# Patient Record
Sex: FEMALE | Race: WHITE | Employment: FULL TIME | URBAN - METROPOLITAN AREA
[De-identification: names, ages, dates, MRNs, and addresses within clinical notes are randomized per-mention and may not be internally consistent; named-entity substitution may affect disease eponyms.]

---

## 2017-07-11 ENCOUNTER — TELEPHONE (OUTPATIENT)
Dept: FAMILY MEDICINE CLINIC | Age: 52
End: 2017-07-11

## 2017-07-11 DIAGNOSIS — Z85.850 HISTORY OF THYROID CANCER: Primary | ICD-10-CM

## 2017-07-26 ENCOUNTER — TELEPHONE (OUTPATIENT)
Dept: FAMILY MEDICINE CLINIC | Age: 52
End: 2017-07-26

## 2017-07-31 ENCOUNTER — TELEPHONE (OUTPATIENT)
Dept: FAMILY MEDICINE CLINIC | Age: 52
End: 2017-07-31

## 2017-07-31 ENCOUNTER — HOSPITAL ENCOUNTER (OUTPATIENT)
Dept: NURSING | Age: 52
Discharge: HOME | End: 2017-07-31
Payer: COMMERCIAL

## 2017-07-31 VITALS
WEIGHT: 210.6 LBS | BODY MASS INDEX: 36.15 KG/M2 | HEART RATE: 78 BPM | TEMPERATURE: 98.1 F | SYSTOLIC BLOOD PRESSURE: 129 MMHG | RESPIRATION RATE: 16 BRPM | DIASTOLIC BLOOD PRESSURE: 75 MMHG

## 2017-07-31 PROCEDURE — 6360000002 HC RX W HCPCS: Performed by: FAMILY MEDICINE

## 2017-07-31 PROCEDURE — 96372 THER/PROPH/DIAG INJ SC/IM: CPT

## 2017-07-31 RX ADMIN — THYROTROPIN ALFA 0.9 MG: KIT at 12:28

## 2017-07-31 ASSESSMENT — PAIN - FUNCTIONAL ASSESSMENT: PAIN_FUNCTIONAL_ASSESSMENT: 0-10

## 2017-07-31 NOTE — IP AVS SNAPSHOT
After Visit Summary  (Discharge Instructions)    Medication List for Home    Based on the information you provided to us as well as any changes during this visit, the following is your updated medication list.  Compare this with your prescription bottles at home. If you have any questions or concerns, contact your primary care physician's office. Daily Medication List (This medication list can be shared with any healthcare provider who is helping you manage your medications)      ASK your doctor about these medications if you have questions        Last Dose    Next Dose Due AM NOON PM NIGHT    calcitRIOL 0.5 MCG capsule   Commonly known as:  ROCALTROL   Take 1 capsule by mouth daily                                         CALCIUM CARBONATE PO   Take 648 mg by mouth daily                                         levothyroxine 175 MCG tablet   Commonly known as:  SYNTHROID   Take 200 mcg by mouth daily 200 mg mon thru sat 100 mcg on sunday                                         thyrotropin elder 1.1 MG injection   Commonly known as:  THYROGEN   administer 1.1 mg IM on day one, administer 1.1 mg IM on day two at the same time as dose one.                0.9 mg on 7/31/2017 12:28 PM                                    Allergies as of 7/31/2017        Reactions    Tape [Adhesive Tape] Dermatitis    bandaids      Immunizations as of 7/31/2017     No immunizations on file. Last Vitals          Most Recent Value    Temp  98.1 °F (36.7 °C)    Pulse  78    Resp  16    BP  129/75         After Visit Summary    This summary was created for you. Thank you for entrusting your care to us.   The following information includes details about your hospital/visit stay along with steps you should take to help with your recovery once you leave the hospital.  In this packet, you will find information about the topics listed below:    · Instructions about your medications including a list of your home medications · A summary of your hospital visit  · Follow-up appointments once you have left the hospital  · Your care plan at home      You may receive a survey regarding the care you received during your stay. Your input is valuable to us. We encourage you to complete and return your survey in the envelope provided. We hope you will choose us in the future for your healthcare needs. Patient Information     Patient Name LILY Houser 1965      Care Provided at:     Name Address Phone       [address] [phone]            Your Visit    Here you will find information about your visit, including the reason for your visit. Please take this sheet with you when you visit your doctor or other health care provider in the future. It will help determine the best possible medical care for you at that time. If you have any questions once you leave the hospital, please call the department phone number listed below. Why you were here     Your primary diagnosis was:  Not on File      Visit Information     Date & Time Department Dept. Phone    2017 [facility] [phone]       Follow-up Appointments    Below is a list of your follow-up and future appointments. This may not be a complete list as you may have made appointments directly with providers that we are not aware of or your providers may have made some for you. Please call your providers to confirm appointments. It is important to keep your appointments. Please bring your current insurance card, photo ID, co-pay, and all medication bottles to your appointment. If self-pay, payment is expected at the time of service.         Future Appointments     2017 11:30 AM     Appointment with [facility] EXAM ROOM 3 at [facility] ([phone])   Please report to second floor, 2C (main elevators, turn right)   [address]  [place] [facility] [address] 8/28/2017 7:00 AM     Appointment with GELA Malik at [facility] ([phone])   PREPS:  * Arrive 15 minutes prior  * No powder, lotion, or deodorant under the arms or around the breast area  * No perfumes or scented products Please report to [address], [address] [facility], [address] [facility] [address]  [address]       8/28/2017 1:00 PM     Appointment with Carmen Hopper CNP at [place] [address] ([phone])   If this is a sports or school physical please bring the physical form with you. [address] [facility]         Preventive Care        Date Due    HIV screening is recommended for all people regardless of risk factors  aged 15-65 years at least once (lifetime) who have never been HIV tested. 4/3/1982    Tetanus Combination Vaccine (1 - Tdap) 4/3/1986    Colonoscopy 4/3/2017    Yearly Flu Vaccine (1) 8/1/2017    Pap Smear 9/1/2017    Mammograms are recommended every 2 years for low/average risk patients aged 48 - 69, and every year for high risk patients per updated national guidelines. However these guidelines can be individualized by your provider. 2/15/2018    Cholesterol Screening 9/29/2021                 Care Plan Once You Return Home    This section includes instructions you will need to follow once you leave the hospital.  Your care team will discuss these with you, so you and those caring for you know how to best care for your health needs at home. This section may also include educational information about certain health topics that may be of help to you. Discharge Instructions       Return Tuesday around 11:30 for next injection    Important information for a smoker       SMOKING: QUIT SMOKING. THIS IS THE MOST IMPORTANT ACTION YOU CAN TAKE TO IMPROVE YOUR CURRENT AND FUTURE HEALTH.      Call the [facility] [place] [facility] [place] NOW ([phone]) Smoking harms nonsmokers. When nonsmokers are around people who smoke, they absorb nicotine, carbon monoxide, and other ingredients of tobacco smoke. DO NOT SMOKE AROUND CHILDREN     Children exposed to secondhand smoke are at an increased risk of:  Sudden Infant Death Syndrome (SIDS), acute respiratory infections, inflammation of the middle ear, and severe asthma. Over a longer time, it causes heart disease and lung cancer. There is no safe level of exposure to secondhand smoke. [employer] Signup     Our records indicate that you have an active [employer] account. You can view your After Visit Summary by going to [email or web address] and logging in with your D[employer]hen username and password. If you don't have a D[employer] username and password but a parent or guardian has access to your record, the parent or guardian should login with their own D[employer]hen username and password and access your record to view the After Visit Summary. Additional Information  If you have questions, please contact the physician practice where you receive care. Remember, [employer] is NOT to be used for urgent needs. For medical emergencies, dial 911. For questions regarding your [employer] account call [phone]. If you have a clinical question, please call your doctor's office. View your information online  ? Review your current list of  medications, immunization, and allergies. ? Review your future test results online . ? Review your discharge instructions provided by your caregivers at discharge    Certain functionality such as prescription refills, scheduling appointments or sending messages to your provider are not activated if your provider does not use [employer] in his/her office    For questions regarding your [employer] account call [phone]. If you have a clinical question, please call your doctor's office. The information on all pages of the After Visit Summary has been reviewed with me, the patient and/or responsible adult, by my health care provider(s). I had the opportunity to ask questions regarding this information. I understand I should dispose of my armband safely at home to protect my health information. A complete copy of the After Visit Summary has been given to me, the patient and/or responsible adult.            Patient Signature/Responsible Adult:____________________    Clinician Signature:_____________________    Date:_____________________    Time:_____________________

## 2017-07-31 NOTE — IP AVS SNAPSHOT
Patient Information     Patient Name LILY Yarbrough 1965         This is your updated medication list to keep with you all times      ASK your doctor about these medications     calcitRIOL 0.5 MCG capsule   Commonly known as:  ROCALTROL       CALCIUM CARBONATE PO       levothyroxine 175 MCG tablet   Commonly known as:  SYNTHROID       thyrotropin elder 1.1 MG injection   Commonly known as:  THYROGEN   administer 1.1 mg IM on day one, administer 1.1 mg IM on day two at the same time as dose one.

## 2017-07-31 NOTE — PROGRESS NOTES
[id number] Alert female admitted for injection procedure reviewed with pt verbalized understanding. Pt rights and responsibilities offered to pt to read. 1240 Home instructions to pt verbalized understanding,  [phone] Discharged ambulatory stable home.         __met__ Safety:       (Environmental)   [place] to environment   Ensure ID band is correct and in place/ allergy band as needed   Assess for fall risk   Initiate fall precautions as applicable (fall band, side rails, etc.)   Call light within reach   Bed in low position/ wheels locked    __met__ Pain:        Assess pain level and characteristics   Administer analgesics as ordered   Assess effectiveness of pain management and report to MD as needed    ___met_ Knowledge Deficit:   Assess baseline knowledge   Provide teaching at level of understanding   Provide teaching via preferred learning method   Evaluate teaching effectiveness    ___

## 2017-08-01 ENCOUNTER — HOSPITAL ENCOUNTER (OUTPATIENT)
Dept: NURSING | Age: 52
Discharge: HOME | End: 2017-08-01
Payer: COMMERCIAL

## 2017-08-01 VITALS
SYSTOLIC BLOOD PRESSURE: 136 MMHG | HEART RATE: 80 BPM | TEMPERATURE: 97.7 F | OXYGEN SATURATION: 99 % | RESPIRATION RATE: 16 BRPM | DIASTOLIC BLOOD PRESSURE: 83 MMHG | WEIGHT: 209 LBS | BODY MASS INDEX: 35.87 KG/M2

## 2017-08-01 PROCEDURE — 96372 THER/PROPH/DIAG INJ SC/IM: CPT

## 2017-08-01 PROCEDURE — 6360000002 HC RX W HCPCS: Performed by: FAMILY MEDICINE

## 2017-08-01 RX ADMIN — THYROTROPIN ALFA 0.9 MG: 0.9 INJECTION, POWDER, FOR SOLUTION INTRAMUSCULAR at 12:19

## 2017-08-01 NOTE — IP AVS SNAPSHOT
Patient Information     Patient Name LILY Means 1965         This is your updated medication list to keep with you all times      ASK your doctor about these medications     calcitRIOL 0.5 MCG capsule   Commonly known as:  ROCALTROL       CALCIUM CARBONATE PO       levothyroxine 175 MCG tablet   Commonly known as:  SYNTHROID       thyrotropin elder 1.1 MG injection   Commonly known as:  THYROGEN   administer 1.1 mg IM on day one, administer 1.1 mg IM on day two at the same time as dose one.

## 2017-08-01 NOTE — IP AVS SNAPSHOT
After Visit Summary  (Discharge Instructions)    Medication List for Home    Based on the information you provided to us as well as any changes during this visit, the following is your updated medication list.  Compare this with your prescription bottles at home. If you have any questions or concerns, contact your primary care physician's office. Daily Medication List (This medication list can be shared with any healthcare provider who is helping you manage your medications)      ASK your doctor about these medications if you have questions        Last Dose    Next Dose Due AM NOON PM NIGHT    calcitRIOL 0.5 MCG capsule   Commonly known as:  ROCALTROL   Take 1 capsule by mouth daily                                         CALCIUM CARBONATE PO   Take 648 mg by mouth daily                                         levothyroxine 175 MCG tablet   Commonly known as:  SYNTHROID   Take 200 mcg by mouth daily 200 mg mon thru sat 100 mcg on sunday                                         thyrotropin elder 1.1 MG injection   Commonly known as:  THYROGEN   administer 1.1 mg IM on day one, administer 1.1 mg IM on day two at the same time as dose one. Allergies as of 8/1/2017        Reactions    Tape [Adhesive Tape] Dermatitis    bandaids      Immunizations as of 8/1/2017     No immunizations on file. Last Vitals          Most Recent Value    Temp  97.7 °F (36.5 °C)    Pulse  80    Resp  16    BP  136/83         After Visit Summary    This summary was created for you. Thank you for entrusting your care to us.   The following information includes details about your hospital/visit stay along with steps you should take to help with your recovery once you leave the hospital.  In this packet, you will find information about the topics listed below:    · Instructions about your medications including a list of your home medications  · A summary of your hospital visit · Follow-up appointments once you have left the hospital  · Your care plan at home      You may receive a survey regarding the care you received during your stay. Your input is valuable to us. We encourage you to complete and return your survey in the envelope provided. We hope you will choose us in the future for your healthcare needs. Patient Information     Patient Name LILY Gomez 1965      Care Provided at:     Name Address Phone       [address] [phone]            Your Visit    Here you will find information about your visit, including the reason for your visit. Please take this sheet with you when you visit your doctor or other health care provider in the future. It will help determine the best possible medical care for you at that time. If you have any questions once you leave the hospital, please call the department phone number listed below. Why you were here     Your primary diagnosis was:  Not on File      Visit Information     Date & Time Department Dept. Phone    2017 Sandra Westley [place] 281 [phone]       Follow-up Appointments    Below is a list of your follow-up and future appointments. This may not be a complete list as you may have made appointments directly with providers that we are not aware of or your providers may have made some for you. Please call your providers to confirm appointments. It is important to keep your appointments. Please bring your current insurance card, photo ID, co-pay, and all medication bottles to your appointment. If self-pay, payment is expected at the time of service.         Future Appointments     2017 7:00 AM     Appointment with GELA Shoemaker at [facility] ([phone])   PREPS:  * Arrive 15 minutes prior  * No powder, lotion, or deodorant under the arms or around the breast area  * No perfumes or scented products Please report to [address] [place] [address]., [address], [address] [facility], [address] [facility] [address]  Jakci Garcia       8/28/2017 1:00 PM     Appointment with Cleo Ayala CNP at [place] [address] ([phone])   If this is a sports or school physical please bring the physical form with you. [facility]         Preventive Care        Date Due    HIV screening is recommended for all people regardless of risk factors  aged 15-65 years at least once (lifetime) who have never been HIV tested. 4/3/1982    Tetanus Combination Vaccine (1 - Tdap) 4/3/1986    Colonoscopy 4/3/2017    Yearly Flu Vaccine (1) 8/1/2017    Pap Smear 9/1/2017    Mammograms are recommended every 2 years for low/average risk patients aged 48 - 69, and every year for high risk patients per updated national guidelines. However these guidelines can be individualized by your provider. 2/15/2018    Cholesterol Screening 9/29/2021                 Care Plan Once You Return Home    This section includes instructions you will need to follow once you leave the hospital.  Your care team will discuss these with you, so you and those caring for you know how to best care for your health needs at home. This section may also include educational information about certain health topics that may be of help to you. Discharge Instructions       ACTIVITY:  Continue usual care with your doctor. Call your doctor immediately if any severe problems or go to the nearest emergency room. I have been treated and hereby acknowledge receiving this instruction sheet. Important information for a smoker       SMOKING: QUIT SMOKING. THIS IS THE MOST IMPORTANT ACTION YOU CAN TAKE TO IMPROVE YOUR CURRENT AND FUTURE HEALTH.      Call the [address] [place] [address] at [place] NOW ([phone]) Smoking harms nonsmokers. When nonsmokers are around people who smoke, they absorb nicotine, carbon monoxide, and other ingredients of tobacco smoke. DO NOT SMOKE AROUND CHILDREN     Children exposed to secondhand smoke are at an increased risk of:  Sudden Infant Death Syndrome (SIDS), acute respiratory infections, inflammation of the middle ear, and severe asthma. Over a longer time, it causes heart disease and lung cancer. There is no safe level of exposure to secondhand smoke. [employer] Signup     Our records indicate that you have an active [employer] account. You can view your After Visit Summary by going to [email or web address] and logging in with your [employer] username and password. If you don't have a [employer] username and password but a parent or guardian has access to your record, the parent or guardian should login with their own [employer] username and password and access your record to view the After Visit Summary. Additional Information  If you have questions, please contact the physician practice where you receive care. Remember, [employer] is NOT to be used for urgent needs. For medical emergencies, dial 911. For questions regarding your [employer] account call [phone]. If you have a clinical question, please call your doctor's office. View your information online  ? Review your current list of  medications, immunization, and allergies. ? Review your future test results online . ? Review your discharge instructions provided by your caregivers at discharge    Certain functionality such as prescription refills, scheduling appointments or sending messages to your provider are not activated if your provider does not use [email or web address] in his/her office    For questions regarding your [employer] account call [phone]. If you have a clinical question, please call your doctor's office. The information on all pages of the After Visit Summary has been reviewed with me, the patient and/or responsible adult, by my health care provider(s). I had the opportunity to ask questions regarding this information. I understand I should dispose of my armband safely at home to protect my health information. A complete copy of the After Visit Summary has been given to me, the patient and/or responsible adult. Patient Signature/Responsible Adult:____________________    Clinician Signature:_____________________    Date:_____________________    Time:_____________________        More Information           thyrotropin elder  Pronunciation:  HELENA Barnes  Brand:  Thyrogen  What is the most important information I should know about thyrotropin elder? Carefully follow your doctor's instructions about the timing of your medications, scans, and other treatments. Tell each of your healthcare providers about all your medical conditions, allergies, and all medicines you use. What is thyrotropin elder? Thyrotropin elder is a manmade form of a protein similar to thyroid-stimulating hormone (TSH), which is normally produced by your thyroid. Thyrotropin elder keeps your TSH levels steady while you undergo thyroid tests or treatments that can reduce TSH and cause symptoms of low thyroid (hypothyroidism). Thyrotropin elder is used together with radioactive iodine ablation (a procedure to remove thyroid tissue that was not removed with surgery) in people with thyroid cancer. Thyrotropin elder is also used during medical testing to check for certain types of thyroid cancer that has returned after treatment. Thyrotropin elder may not help your doctor find all signs of cancer, and there is still a chance that some of your cancer could be missed. Thyrotropin elder will not treat cancer that has spread to other parts of the body. Thyrotropin elder may also be used for purposes not listed in this medication guide. What should I discuss with my healthcare provider before receiving thyrotropin elder? You should not use thyrotropin elder if you are allergic to it. To make sure thyrotropin elder is safe for you, tell your doctor if you have:  · kidney disease (or if you are on dialysis);  · heart disease or history of stroke;  · if you take birth control pills; or  · if you are a woman and you smoke or have migraine headaches. FDA pregnancy category C. It is not known whether thyrotropin elder will harm an unborn baby. Tell your doctor if you are pregnant or plan to become pregnant while using this medicine. It is not known whether thyrotropin elder passes into breast milk or if it could harm a nursing baby. Tell your doctor if you are breast-feeding a baby. How is thyrotropin elder given? Thyrotropin elder is injected into a muscle of the buttock. A healthcare provider will give you this injection. Thyrotropin elder is usually given in 2 separate injections 24 hours apart. You may also be given radioactive iodine to take 24 hours after your last thyrotropin elder injection. If you need a thyroid scan, the scan should take place 48 hours after you take the radioactive iodine. Carefully follow your doctor's instructions about the timing of your medications, scans, and other treatments. Your doctor may want you to receive this medicine in a hospital or clinic setting to quickly treat any serious side effects that occur. You may be given steroid medicine to help keep tumors from growing larger while you are receiving thyrotropin elder. Drink plenty of liquids before you are treated with thyrotropin elder. As part of your treatment, you will need frequent blood tests. You may not notice any change in your symptoms, but your blood work will help your doctor determine whether treatment has been effective. What happens if I miss a dose?   Call your doctor for instructions if you miss an appointment for your thyrotropin elder injection. What happens if I overdose? Since this medicine is given by a healthcare professional in a medical setting, an overdose is unlikely to occur. What should I avoid after receiving thyrotropin elder? Follow your doctor's instructions about any restrictions on food, beverages, or activity. What are the possible side effects of thyrotropin elder? Get emergency medical help if you have any of these signs of an allergic reaction: hives; difficult breathing; swelling of your face, lips, tongue, or throat. Call your doctor at once if you have:  · throat pain or swelling, trouble breathing;  · sudden swelling, pain, numbness, or loss of movement in any part of your body;  · signs of overactive thyroid --unexplained weight loss, increased appetite, changes in bowel habits, fast or pounding heartbeats, sweating, feeling anxious or irritable; or  · signs of a stroke --sudden numbness or weakness (especially on one side of the body), sudden severe headache, slurred speech, problems with vision or balance. Common side effects may include:  · nausea, vomiting, diarrhea;  · headache, dizziness;  · weakness, tired feeling;  · sleep problems (insomnia); or  · numbness or tingly feeling. This is not a complete list of side effects and others may occur. Call your doctor for medical advice about side effects. You may report side effects to FDA at 1-236-FDA-0641. What other drugs will affect thyrotropin elder? Other drugs may interact with thyrotropin elder, including prescription and over-the-counter medicines, vitamins, and herbal products. Tell each of your health care providers about all medicines you use now and any medicine you start or stop using. Where can I get more information? Your pharmacist can provide more information about thyrotropin elder.     Remember, keep this and all other medicines out of the reach of children, never share your medicines with others, and use this medication only for the indication prescribed. Every effort has been made to ensure that the information provided by [address] is accurate, up-to-date, and complete, but no guarantee is made to that effect. Drug information contained herein may be time sensitive. [facility] information has been compiled for use by healthcare practitioners and consumers in the [place] and therefore [facility] does not warrant that uses outside of the [place] are appropriate, unless specifically indicated otherwise. [facility]'s drug information does not endorse drugs, diagnose patients or recommend therapy. [facility]'s drug information is an informational resource designed to assist licensed healthcare practitioners in caring for their patients and/or to serve consumers viewing this service as a supplement to, and not a substitute for, the expertise, skill, knowledge and judgment of healthcare practitioners. The absence of a warning for a given drug or drug combination in no way should be construed to indicate that the drug or drug combination is safe, effective or appropriate for any given patient. [facility] does not assume any responsibility for any aspect of healthcare administered with the aid of information [facility] provides. The information contained herein is not intended to cover all possible uses, directions, precautions, warnings, drug interactions, allergic reactions, or adverse effects. If you have questions about the drugs you are taking, check with your doctor, nurse or pharmacist.  Copyright 9991-1519 [address] [place] [address] Dr CUTLER. Version: 1.02. Revision date: 6/10/2014. Care instructions adapted under license by your healthcare professional. If you have questions about a medical condition or this instruction, always ask your healthcare professional. [place] [address] any warranty or liability for your use of this information.

## 2017-08-01 NOTE — PROGRESS NOTES
12:12 PM pt arrives for injection  12:12 PM PATIENT RIGHTS AND RESPONSIBILITIES SHEET OFFERED TO PT TO READ.   1215   __m__ Safety:       (Environmental)   [place] to environment   Ensure ID band is correct and in place/ allergy band as needed   Assess for fall risk   Initiate fall precautions as applicable (fall band, side rails, etc.)   Call light within reach   Bed in low position/ wheels locked    __m__ Pain:        Assess pain level and characteristics   Administer analgesics as ordered   Assess effectiveness of pain management and report to MD as needed    __m__ Knowledge Deficit:   Assess baseline knowledge   Provide teaching at level of understanding   Provide teaching via preferred learning method   Evaluate teaching effectiveness       1220 WRITTEN DISCHARGE INSTRUCTIONS GIVEN TO PT-VERBALIZES UNDERSTANDING    PT DISCHARGED AMBULATORY IN SATISFACTORY CONDITION

## 2017-08-28 ENCOUNTER — OFFICE VISIT (OUTPATIENT)
Dept: FAMILY MEDICINE CLINIC | Age: 52
End: 2017-08-28
Payer: COMMERCIAL

## 2017-08-28 ENCOUNTER — HOSPITAL ENCOUNTER (OUTPATIENT)
Dept: WOMENS IMAGING | Age: 52
Discharge: HOME | End: 2017-08-28
Payer: COMMERCIAL

## 2017-08-28 VITALS
RESPIRATION RATE: 16 BRPM | HEART RATE: 84 BPM | WEIGHT: 208.4 LBS | DIASTOLIC BLOOD PRESSURE: 76 MMHG | SYSTOLIC BLOOD PRESSURE: 124 MMHG | TEMPERATURE: 97.6 F | BODY MASS INDEX: 36.93 KG/M2 | HEIGHT: 63 IN

## 2017-08-28 DIAGNOSIS — Z13.1 SCREENING FOR DIABETES MELLITUS: ICD-10-CM

## 2017-08-28 DIAGNOSIS — E66.09 NON MORBID OBESITY DUE TO EXCESS CALORIES: ICD-10-CM

## 2017-08-28 DIAGNOSIS — Z23 NEED FOR DIPHTHERIA-TETANUS-PERTUSSIS (TDAP) VACCINE: ICD-10-CM

## 2017-08-28 DIAGNOSIS — Z12.31 VISIT FOR SCREENING MAMMOGRAM: ICD-10-CM

## 2017-08-28 DIAGNOSIS — E78.00 HYPERCHOLESTEREMIA: ICD-10-CM

## 2017-08-28 DIAGNOSIS — Z12.11 COLON CANCER SCREENING: ICD-10-CM

## 2017-08-28 DIAGNOSIS — Z00.00 WELL ADULT EXAM: Primary | ICD-10-CM

## 2017-08-28 DIAGNOSIS — Z11.4 SCREENING FOR HIV (HUMAN IMMUNODEFICIENCY VIRUS): ICD-10-CM

## 2017-08-28 PROCEDURE — 77063 BREAST TOMOSYNTHESIS BI: CPT

## 2017-08-28 PROCEDURE — 99396 PREV VISIT EST AGE 40-64: CPT | Performed by: NURSE PRACTITIONER

## 2017-08-28 PROCEDURE — 90715 TDAP VACCINE 7 YRS/> IM: CPT | Performed by: NURSE PRACTITIONER

## 2017-08-28 PROCEDURE — 90471 IMMUNIZATION ADMIN: CPT | Performed by: NURSE PRACTITIONER

## 2017-08-28 ASSESSMENT — ENCOUNTER SYMPTOMS
NAUSEA: 0
CONSTIPATION: 0
ABDOMINAL PAIN: 0
APNEA: 0
VOICE CHANGE: 0
BLOOD IN STOOL: 0
DIARRHEA: 0
VOMITING: 0
BACK PAIN: 0
WHEEZING: 0
TROUBLE SWALLOWING: 0
ABDOMINAL DISTENTION: 0
COUGH: 0
SHORTNESS OF BREATH: 0
PHOTOPHOBIA: 0
ANAL BLEEDING: 0

## 2017-08-28 ASSESSMENT — PATIENT HEALTH QUESTIONNAIRE - PHQ9
SUM OF ALL RESPONSES TO PHQ9 QUESTIONS 1 & 2: 0
2. FEELING DOWN, DEPRESSED OR HOPELESS: 0
1. LITTLE INTEREST OR PLEASURE IN DOING THINGS: 0
SUM OF ALL RESPONSES TO PHQ QUESTIONS 1-9: 0

## 2017-09-29 ENCOUNTER — HOSPITAL ENCOUNTER (OUTPATIENT)
Age: 52
Discharge: HOME | End: 2017-09-29
Payer: COMMERCIAL

## 2017-09-29 ENCOUNTER — TELEPHONE (OUTPATIENT)
Dept: FAMILY MEDICINE CLINIC | Age: 52
End: 2017-09-29

## 2017-09-29 DIAGNOSIS — Z13.1 SCREENING FOR DIABETES MELLITUS: ICD-10-CM

## 2017-09-29 DIAGNOSIS — E78.00 HYPERCHOLESTEREMIA: ICD-10-CM

## 2017-09-29 DIAGNOSIS — E78.00 ELEVATED LDL CHOLESTEROL LEVEL: ICD-10-CM

## 2017-09-29 DIAGNOSIS — R73.09 ELEVATED GLUCOSE: Primary | ICD-10-CM

## 2017-09-29 LAB
ALBUMIN SERPL-MCNC: 4.2 G/DL (ref 3.5–5.1)
ALP BLD-CCNC: 54 U/L (ref 38–126)
ALT SERPL-CCNC: 8 U/L (ref 11–66)
ANION GAP SERPL CALCULATED.3IONS-SCNC: 13 MEQ/L (ref 8–16)
AST SERPL-CCNC: 10 U/L (ref 5–40)
BILIRUB SERPL-MCNC: 0.2 MG/DL (ref 0.3–1.2)
BUN BLDV-MCNC: 19 MG/DL (ref 7–22)
CALCIUM SERPL-MCNC: 8 MG/DL (ref 8.5–10.5)
CHLORIDE BLD-SCNC: 106 MEQ/L (ref 98–111)
CHOLESTEROL, TOTAL: 187 MG/DL (ref 100–199)
CO2: 23 MEQ/L (ref 23–33)
CREAT SERPL-MCNC: 0.6 MG/DL (ref 0.4–1.2)
GFR SERPL CREATININE-BSD FRML MDRD: > 90 ML/MIN/1.73M2
GLUCOSE BLD-MCNC: 118 MG/DL (ref 70–108)
HDLC SERPL-MCNC: 46 MG/DL
LDL CHOLESTEROL CALCULATED: 120 MG/DL
POTASSIUM SERPL-SCNC: 4.4 MEQ/L (ref 3.5–5.2)
SODIUM BLD-SCNC: 142 MEQ/L (ref 135–145)
TOTAL PROTEIN: 6.6 G/DL (ref 6.1–8)
TRIGL SERPL-MCNC: 106 MG/DL (ref 0–199)

## 2017-09-29 PROCEDURE — 80053 COMPREHEN METABOLIC PANEL: CPT

## 2017-09-29 PROCEDURE — 80061 LIPID PANEL: CPT

## 2017-09-29 PROCEDURE — 36415 COLL VENOUS BLD VENIPUNCTURE: CPT

## 2017-11-17 ENCOUNTER — TELEPHONE (OUTPATIENT)
Dept: FAMILY MEDICINE CLINIC | Age: 52
End: 2017-11-17

## 2017-11-17 RX ORDER — AMOXICILLIN AND CLAVULANATE POTASSIUM 875; 125 MG/1; MG/1
1 TABLET, FILM COATED ORAL 2 TIMES DAILY
Qty: 20 TABLET | Refills: 0 | Status: SHIPPED | OUTPATIENT
Start: 2017-11-17 | End: 2017-11-27

## 2017-11-17 NOTE — TELEPHONE ENCOUNTER
Pt is complaining of sinus congestion and pressure, cough and has had a sore throat (comes and goes) x 2-3 weeks. She has tried OTC medications such as tylenol cold, sudafed, and claritin. 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 Pt is requesting something to be called in due to not being able to come in and does not have a desktop to complete an e-visit.     Rach

## 2017-12-22 ENCOUNTER — HOSPITAL ENCOUNTER (OUTPATIENT)
Age: 52
Discharge: HOME | End: 2017-12-22
Payer: COMMERCIAL

## 2017-12-22 ENCOUNTER — HOSPITAL ENCOUNTER (OUTPATIENT)
Dept: GENERAL RADIOLOGY | Age: 52
Discharge: HOME | End: 2017-12-22
Payer: COMMERCIAL

## 2017-12-22 DIAGNOSIS — Z01.818 PREOP EXAMINATION: ICD-10-CM

## 2017-12-22 DIAGNOSIS — Z87.891 PERSONAL HISTORY OF TOBACCO USE, PRESENTING HAZARDS TO HEALTH: ICD-10-CM

## 2017-12-22 LAB
ANION GAP SERPL CALCULATED.3IONS-SCNC: 13 MEQ/L (ref 8–16)
BUN BLDV-MCNC: 13 MG/DL (ref 7–22)
CALCIUM SERPL-MCNC: 8.3 MG/DL (ref 8.5–10.5)
CHLORIDE BLD-SCNC: 101 MEQ/L (ref 98–111)
CO2: 26 MEQ/L (ref 23–33)
CREAT SERPL-MCNC: 0.6 MG/DL (ref 0.4–1.2)
EKG ATRIAL RATE: 76 BPM
EKG P AXIS: 41 DEGREES
EKG P-R INTERVAL: 148 MS
EKG Q-T INTERVAL: 396 MS
EKG QRS DURATION: 86 MS
EKG QTC CALCULATION (BAZETT): 445 MS
EKG R AXIS: 52 DEGREES
EKG T AXIS: 21 DEGREES
EKG VENTRICULAR RATE: 76 BPM
GFR SERPL CREATININE-BSD FRML MDRD: > 90 ML/MIN/1.73M2
GLUCOSE BLD-MCNC: 110 MG/DL (ref 70–108)
HCT VFR BLD CALC: 33 % (ref 37–47)
HEMOGLOBIN: 10.7 GM/DL (ref 12–16)
MCH RBC QN AUTO: 23.4 PG (ref 27–31)
MCHC RBC AUTO-ENTMCNC: 32.5 GM/DL (ref 33–37)
MCV RBC AUTO: 71.9 FL (ref 81–99)
PDW BLD-RTO: 17.6 % (ref 11.5–14.5)
PLATELET # BLD: 343 THOU/MM3 (ref 130–400)
PMV BLD AUTO: 8.6 MCM (ref 7.4–10.4)
POTASSIUM SERPL-SCNC: 4.1 MEQ/L (ref 3.5–5.2)
RBC # BLD: 4.58 MILL/MM3 (ref 4.2–5.4)
SODIUM BLD-SCNC: 140 MEQ/L (ref 135–145)
WBC # BLD: 9.9 THOU/MM3 (ref 4.8–10.8)

## 2017-12-22 PROCEDURE — 85027 COMPLETE CBC AUTOMATED: CPT

## 2017-12-22 PROCEDURE — 93005 ELECTROCARDIOGRAM TRACING: CPT

## 2017-12-22 PROCEDURE — 36415 COLL VENOUS BLD VENIPUNCTURE: CPT

## 2017-12-22 PROCEDURE — 80048 BASIC METABOLIC PNL TOTAL CA: CPT

## 2017-12-22 PROCEDURE — 71020 XR CHEST STANDARD TWO VW: CPT

## 2017-12-28 NOTE — PROGRESS NOTES
PAT call attempted patient unavailable left message with instructions    NPO after midnight  Bring insurance info and drivers license  Wear comfortable clean clothing  Do not bring jewelry   Shower night before and morning of surgery with a liquid antibacterial soap  Bring list of medications with dosage and how often taken  Follow all instructions given by your physician   needed at discharge

## 2017-12-29 ENCOUNTER — HOSPITAL ENCOUNTER (OUTPATIENT)
Age: 52
Setting detail: OUTPATIENT SURGERY
Discharge: HOME | End: 2017-12-29
Attending: SPECIALIST | Admitting: SPECIALIST
Payer: COMMERCIAL

## 2017-12-29 ENCOUNTER — ANESTHESIA (OUTPATIENT)
Dept: OPERATING ROOM | Age: 52
End: 2017-12-29
Payer: COMMERCIAL

## 2017-12-29 ENCOUNTER — ANESTHESIA EVENT (OUTPATIENT)
Dept: OPERATING ROOM | Age: 52
End: 2017-12-29
Payer: COMMERCIAL

## 2017-12-29 VITALS — DIASTOLIC BLOOD PRESSURE: 64 MMHG | OXYGEN SATURATION: 100 % | SYSTOLIC BLOOD PRESSURE: 144 MMHG | TEMPERATURE: 97.7 F

## 2017-12-29 VITALS
WEIGHT: 213.4 LBS | HEART RATE: 94 BPM | RESPIRATION RATE: 12 BRPM | OXYGEN SATURATION: 94 % | TEMPERATURE: 98.3 F | BODY MASS INDEX: 37.81 KG/M2 | SYSTOLIC BLOOD PRESSURE: 150 MMHG | HEIGHT: 63 IN | DIASTOLIC BLOOD PRESSURE: 72 MMHG

## 2017-12-29 LAB — PREGNANCY, URINE: NEGATIVE

## 2017-12-29 PROCEDURE — 3600000002 HC SURGERY LEVEL 2 BASE: Performed by: SPECIALIST

## 2017-12-29 PROCEDURE — 3700000001 HC ADD 15 MINUTES (ANESTHESIA): Performed by: SPECIALIST

## 2017-12-29 PROCEDURE — 2500000003 HC RX 250 WO HCPCS

## 2017-12-29 PROCEDURE — 3600000012 HC SURGERY LEVEL 2 ADDTL 15MIN: Performed by: SPECIALIST

## 2017-12-29 PROCEDURE — 2500000003 HC RX 250 WO HCPCS: Performed by: SPECIALIST

## 2017-12-29 PROCEDURE — 7100000010 HC PHASE II RECOVERY - FIRST 15 MIN: Performed by: SPECIALIST

## 2017-12-29 PROCEDURE — 6360000002 HC RX W HCPCS

## 2017-12-29 PROCEDURE — 2500000003 HC RX 250 WO HCPCS: Performed by: NURSE ANESTHETIST, CERTIFIED REGISTERED

## 2017-12-29 PROCEDURE — 6360000002 HC RX W HCPCS: Performed by: SPECIALIST

## 2017-12-29 PROCEDURE — 81025 URINE PREGNANCY TEST: CPT

## 2017-12-29 PROCEDURE — 6370000000 HC RX 637 (ALT 250 FOR IP): Performed by: SPECIALIST

## 2017-12-29 PROCEDURE — 7100000011 HC PHASE II RECOVERY - ADDTL 15 MIN: Performed by: SPECIALIST

## 2017-12-29 PROCEDURE — A6402 STERILE GAUZE <= 16 SQ IN: HCPCS | Performed by: SPECIALIST

## 2017-12-29 PROCEDURE — 6360000002 HC RX W HCPCS: Performed by: NURSE ANESTHETIST, CERTIFIED REGISTERED

## 2017-12-29 PROCEDURE — 3700000000 HC ANESTHESIA ATTENDED CARE: Performed by: SPECIALIST

## 2017-12-29 PROCEDURE — 88305 TISSUE EXAM BY PATHOLOGIST: CPT

## 2017-12-29 RX ORDER — LIDOCAINE HYDROCHLORIDE AND EPINEPHRINE 10; 10 MG/ML; UG/ML
INJECTION, SOLUTION INFILTRATION; PERINEURAL PRN
Status: DISCONTINUED | OUTPATIENT
Start: 2017-12-29 | End: 2017-12-29 | Stop reason: HOSPADM

## 2017-12-29 RX ORDER — KETAMINE HYDROCHLORIDE 50 MG/ML
INJECTION, SOLUTION, CONCENTRATE INTRAMUSCULAR; INTRAVENOUS PRN
Status: DISCONTINUED | OUTPATIENT
Start: 2017-12-29 | End: 2017-12-29 | Stop reason: SDUPTHER

## 2017-12-29 RX ORDER — FENTANYL CITRATE 50 UG/ML
INJECTION, SOLUTION INTRAMUSCULAR; INTRAVENOUS PRN
Status: DISCONTINUED | OUTPATIENT
Start: 2017-12-29 | End: 2017-12-29 | Stop reason: SDUPTHER

## 2017-12-29 RX ORDER — HYDROCODONE BITARTRATE AND ACETAMINOPHEN 5; 325 MG/1; MG/1
TABLET ORAL
Status: DISCONTINUED
Start: 2017-12-29 | End: 2017-12-29 | Stop reason: HOSPADM

## 2017-12-29 RX ORDER — ONDANSETRON 2 MG/ML
INJECTION INTRAMUSCULAR; INTRAVENOUS PRN
Status: DISCONTINUED | OUTPATIENT
Start: 2017-12-29 | End: 2017-12-29 | Stop reason: SDUPTHER

## 2017-12-29 RX ORDER — LIDOCAINE HYDROCHLORIDE 20 MG/ML
INJECTION, SOLUTION INFILTRATION; PERINEURAL PRN
Status: DISCONTINUED | OUTPATIENT
Start: 2017-12-29 | End: 2017-12-29 | Stop reason: SDUPTHER

## 2017-12-29 RX ORDER — PROPOFOL 10 MG/ML
INJECTION, EMULSION INTRAVENOUS PRN
Status: DISCONTINUED | OUTPATIENT
Start: 2017-12-29 | End: 2017-12-29 | Stop reason: SDUPTHER

## 2017-12-29 RX ORDER — SODIUM CHLORIDE 9 MG/ML
INJECTION, SOLUTION INTRAVENOUS CONTINUOUS
Status: DISCONTINUED | OUTPATIENT
Start: 2017-12-29 | End: 2017-12-29 | Stop reason: HOSPADM

## 2017-12-29 RX ORDER — HYDROCODONE BITARTRATE AND ACETAMINOPHEN 5; 325 MG/1; MG/1
1 TABLET ORAL ONCE
Status: COMPLETED | OUTPATIENT
Start: 2017-12-29 | End: 2017-12-29

## 2017-12-29 RX ADMIN — LIDOCAINE HYDROCHLORIDE 100 MG: 20 INJECTION, SOLUTION INFILTRATION; PERINEURAL at 07:55

## 2017-12-29 RX ADMIN — FENTANYL CITRATE 50 MCG: 50 INJECTION INTRAMUSCULAR; INTRAVENOUS at 07:55

## 2017-12-29 RX ADMIN — PROPOFOL 190 MG: 10 INJECTION, EMULSION INTRAVENOUS at 08:00

## 2017-12-29 RX ADMIN — KETAMINE HYDROCHLORIDE 25 MG: 50 INJECTION, SOLUTION INTRAMUSCULAR; INTRAVENOUS at 07:55

## 2017-12-29 RX ADMIN — CEFAZOLIN SODIUM 1 G: 1 INJECTION, SOLUTION INTRAVENOUS at 07:56

## 2017-12-29 RX ADMIN — FENTANYL CITRATE 100 MCG: 50 INJECTION INTRAMUSCULAR; INTRAVENOUS at 08:35

## 2017-12-29 RX ADMIN — FENTANYL CITRATE 50 MCG: 50 INJECTION INTRAMUSCULAR; INTRAVENOUS at 07:51

## 2017-12-29 RX ADMIN — PROPOFOL 30 MG: 10 INJECTION, EMULSION INTRAVENOUS at 07:55

## 2017-12-29 RX ADMIN — HYDROCODONE BITARTRATE AND ACETAMINOPHEN 1 TABLET: 5; 325 TABLET ORAL at 08:55

## 2017-12-29 RX ADMIN — ONDANSETRON 4 MG: 2 INJECTION INTRAMUSCULAR; INTRAVENOUS at 08:30

## 2017-12-29 RX ADMIN — KETAMINE HYDROCHLORIDE 25 MG: 50 INJECTION, SOLUTION INTRAMUSCULAR; INTRAVENOUS at 08:15

## 2017-12-29 ASSESSMENT — PAIN - FUNCTIONAL ASSESSMENT: PAIN_FUNCTIONAL_ASSESSMENT: 0-10

## 2017-12-29 ASSESSMENT — PULMONARY FUNCTION TESTS
PIF_VALUE: 0

## 2017-12-29 ASSESSMENT — PAIN DESCRIPTION - DESCRIPTORS: DESCRIPTORS: ACHING

## 2017-12-29 ASSESSMENT — PAIN SCALES - GENERAL
PAINLEVEL_OUTOF10: 9
PAINLEVEL_OUTOF10: 9

## 2017-12-29 NOTE — ANESTHESIA PRE PROCEDURE
Department of Anesthesiology  Preprocedure Note       Name:  Kennedy Castleman   Age:  50 y.o.  :  1965                                          MRN:  [id number]         Date:  2017      Surgeon: Mellisa Thacker):  Dimple Soto MD    Procedure: Procedure(s):  MOHS REPAIR BCC OF RIGHT ALA    Medications prior to admission:   Prior to Admission medications    Medication Sig Start Date End Date Taking? Authorizing Provider   levothyroxine (SYNTHROID) 175 MCG tablet Take 200 mcg by mouth daily 200 mg mon thru sat 100 mcg on akanksha 11/6/15  Yes Historical Provider, MD       Current medications:    Current Facility-Administered Medications   Medication Dose Route Frequency Provider Last Rate Last Dose    0.9 % sodium chloride infusion   Intravenous Continuous Dimple Soto MD        ceFAZolin (ANCEF) 1 g in dextrose 5 % 50 mL IVPB (premix)  1 g Intravenous Once Dimple Soto MD           Allergies: Allergies   Allergen Reactions    Tape Ella Haymaker Tape] Dermatitis     bandaids       Problem List:    Patient Active Problem List   Diagnosis Code    Post-surgical hypothyroidism E89.0    Papillary carcinoma of thyroid ([facility] [address] C73    Witnessed apneic spells R06.81    Snoring R06.83    Obesity (BMI 30-39. 9) E66.9    Fatigue R53.83    Non-restorative sleep G47.8    Bruxism F45.8    Morning headache R51    Immune disorder (HCC) D89.9    Claustrophobia F40.240    Gastroesophageal reflux disease K21.9    Hypercholesteremia E78.00    Non morbid obesity due to excess calories E66.09       Past Medical History:        Diagnosis Date    BCC (basal cell carcinoma of skin)     Papillary carcinoma of thyroid ([facility] [address]     2015- Dr. Elisha ESPINAL (postoperative nausea and vomiting)        Past Surgical History:        Procedure Laterality Date    CHOLECYSTECTOMY      DILATION AND CURETTAGE OF UTERUS      LYMPHADENECTOMY  2015    63    MOHS SURGERY      PRE-MALIGNANT / BENIGN SKIN LESION last 72 hours. Coags: No results found for: PROTIME, INR, APTT    HCG (If Applicable):   Lab Results   Component Value Date    PREGTESTUR negative 12/29/2017        ABGs: No results found for: PHART, PO2ART, AHQ1AXP, PRC0LID, BEART, U1CIPTBI     Type & Screen (If Applicable):  No results found for: LABABO, LABRH    Anesthesia Evaluation     history of anesthetic complications: PONV. Airway: Mallampati: II       Mouth opening: > = 3 FB Dental:          Pulmonary:       (-) COPD                           Cardiovascular:        (-) CAD      Rhythm: regular                      Neuro/Psych:   (+) headaches:, psychiatric history:            GI/Hepatic/Renal:   (+) GERD:,           Endo/Other:    (+) hypothyroidism::., .                 Abdominal:   (+) obese,         Vascular:                                        Anesthesia Plan      MAC     ASA 2           MIPS: Postoperative opioids intended. Anesthetic plan and risks discussed with patient. Plan discussed with CRNA.                   Lelo Mixon MD   12/29/2017

## 2017-12-29 NOTE — ANESTHESIA POSTPROCEDURE EVALUATION
Department of Anesthesiology  Postprocedure Note    Patient: Jaime Carrillo  MRN: [id number]  YOB: 1965  Date of evaluation: 12/29/2017  Time:  11:14 AM     Procedure Summary     Date:  12/29/17 Room / Location:  [facility] 01 / [address]    Anesthesia Start:  8693 Anesthesia Stop:  Searcy Songster    Procedure:  MOHS REPAIR BCC OF RIGHT ALA and biopsy of left cheek x2 and right calf x1 (Right Ear) Diagnosis:  (BCC OF RIGHT ALA)    Surgeon:  Fab Raya MD Responsible Provider:  Sonia Evans MD    Anesthesia Type:  MAC ASA Status:  2          Anesthesia Type: No value filed. Tre Phase I:      Rte Phase II: Tre Score: 10    Last vitals: Reviewed and per EMR flowsheets. Anesthesia Post Evaluation   [facility]  POST-ANESTHESIA NOTE       Name:  Jaime Carrillo                                         Age:  50 y.o.   MRN:  [id number]      Last Vitals:  BP (!) 150/72   Pulse 94   Temp 98.3 °F (36.8 °C) (Temporal)   Resp 12   Ht 5' 3\" (1.6 m)   Wt 213 lb 6.4 oz (96.8 kg)   LMP 11/23/2017   SpO2 94%   BMI 37.80 kg/m²   Patient Vitals for the past 4 hrs:   BP Temp Temp src Pulse Resp SpO2   12/29/17 0844 (!) 150/72 98.3 °F (36.8 °C) Temporal 94 12 94 %       Level of Consciousness:  Awake    Respiratory:  Stable    Oxygen Saturation:  Stable    Cardiovascular:  Stable    Hydration:  Adequate    PONV:  Stable    Post-op Pain:  Adequate analgesia    Post-op Assessment:  No apparent anesthetic complications    Additional Follow-Up / Treatment / Comment:  None    Sonia Evans MD  December 29, 2017   11:14 AM

## 2018-05-21 ENCOUNTER — OFFICE VISIT (OUTPATIENT)
Dept: FAMILY MEDICINE CLINIC | Age: 53
End: 2018-05-21
Payer: COMMERCIAL

## 2018-05-21 VITALS
BODY MASS INDEX: 37.74 KG/M2 | DIASTOLIC BLOOD PRESSURE: 84 MMHG | SYSTOLIC BLOOD PRESSURE: 116 MMHG | TEMPERATURE: 98 F | WEIGHT: 213 LBS | RESPIRATION RATE: 12 BRPM | HEART RATE: 72 BPM | HEIGHT: 63 IN

## 2018-05-21 DIAGNOSIS — J01.90 ACUTE BACTERIAL SINUSITIS: Primary | ICD-10-CM

## 2018-05-21 DIAGNOSIS — B96.89 ACUTE BACTERIAL SINUSITIS: Primary | ICD-10-CM

## 2018-05-21 DIAGNOSIS — J30.9 ALLERGIC RHINITIS, UNSPECIFIED CHRONICITY, UNSPECIFIED SEASONALITY, UNSPECIFIED TRIGGER: ICD-10-CM

## 2018-05-21 DIAGNOSIS — H65.93 BILATERAL SEROUS OTITIS MEDIA, UNSPECIFIED CHRONICITY: ICD-10-CM

## 2018-05-21 PROCEDURE — 96372 THER/PROPH/DIAG INJ SC/IM: CPT | Performed by: NURSE PRACTITIONER

## 2018-05-21 PROCEDURE — 99213 OFFICE O/P EST LOW 20 MIN: CPT | Performed by: NURSE PRACTITIONER

## 2018-05-21 RX ORDER — PREDNISONE 1 MG/1
TABLET ORAL
Qty: 30 TABLET | Refills: 0 | Status: SHIPPED | OUTPATIENT
Start: 2018-05-21 | End: 2018-05-31

## 2018-05-21 RX ORDER — METHYLPREDNISOLONE ACETATE 80 MG/ML
80 INJECTION, SUSPENSION INTRA-ARTICULAR; INTRALESIONAL; INTRAMUSCULAR; SOFT TISSUE ONCE
Status: COMPLETED | OUTPATIENT
Start: 2018-05-21 | End: 2018-05-21

## 2018-05-21 RX ORDER — CEPHALEXIN 500 MG/1
500 CAPSULE ORAL 3 TIMES DAILY
Qty: 30 CAPSULE | Refills: 0 | Status: SHIPPED | OUTPATIENT
Start: 2018-05-21 | End: 2018-06-22 | Stop reason: ALTCHOICE

## 2018-05-21 RX ORDER — CETIRIZINE HYDROCHLORIDE 10 MG/1
10 TABLET ORAL DAILY
Qty: 30 TABLET | Refills: 1 | Status: SHIPPED | OUTPATIENT
Start: 2018-05-21 | End: 2018-08-15 | Stop reason: SDUPTHER

## 2018-05-21 RX ADMIN — METHYLPREDNISOLONE ACETATE 80 MG: 80 INJECTION, SUSPENSION INTRA-ARTICULAR; INTRALESIONAL; INTRAMUSCULAR; SOFT TISSUE at 12:20

## 2018-05-29 ASSESSMENT — ENCOUNTER SYMPTOMS
RESPIRATORY NEGATIVE: 1
ALLERGIC/IMMUNOLOGIC NEGATIVE: 1
RHINORRHEA: 1
EYES NEGATIVE: 1
GASTROINTESTINAL NEGATIVE: 1

## 2018-05-31 ENCOUNTER — TELEPHONE (OUTPATIENT)
Dept: FAMILY MEDICINE CLINIC | Age: 53
End: 2018-05-31

## 2018-05-31 RX ORDER — AMOXICILLIN 500 MG/1
500 CAPSULE ORAL 3 TIMES DAILY
Qty: 30 CAPSULE | Refills: 0 | Status: SHIPPED | OUTPATIENT
Start: 2018-05-31 | End: 2018-06-10

## 2018-06-12 ENCOUNTER — TELEPHONE (OUTPATIENT)
Dept: ENT CLINIC | Age: 53
End: 2018-06-12

## 2018-06-12 ENCOUNTER — TELEPHONE (OUTPATIENT)
Dept: FAMILY MEDICINE CLINIC | Age: 53
End: 2018-06-12

## 2018-06-12 DIAGNOSIS — R09.81 NASAL CONGESTION: Primary | ICD-10-CM

## 2018-06-22 ENCOUNTER — OFFICE VISIT (OUTPATIENT)
Dept: ENT CLINIC | Age: 53
End: 2018-06-22
Payer: COMMERCIAL

## 2018-06-22 VITALS
HEIGHT: 63 IN | DIASTOLIC BLOOD PRESSURE: 80 MMHG | HEART RATE: 84 BPM | SYSTOLIC BLOOD PRESSURE: 120 MMHG | WEIGHT: 191.3 LBS | BODY MASS INDEX: 33.89 KG/M2 | RESPIRATION RATE: 16 BRPM | TEMPERATURE: 98 F

## 2018-06-22 DIAGNOSIS — J34.89 NASAL OBSTRUCTION: Primary | ICD-10-CM

## 2018-06-22 DIAGNOSIS — H93.8X1 EAR FULLNESS, RIGHT: ICD-10-CM

## 2018-06-22 PROCEDURE — 99203 OFFICE O/P NEW LOW 30 MIN: CPT | Performed by: NURSE PRACTITIONER

## 2018-06-22 ASSESSMENT — ENCOUNTER SYMPTOMS
SHORTNESS OF BREATH: 0
VOICE CHANGE: 0
APNEA: 0
SINUS PRESSURE: 0
VOMITING: 0
COLOR CHANGE: 0
NAUSEA: 0
EYE ITCHING: 0
ABDOMINAL DISTENTION: 0
ABDOMINAL PAIN: 0
CHEST TIGHTNESS: 0
CONSTIPATION: 0
WHEEZING: 0
BACK PAIN: 0
ANAL BLEEDING: 0
DIARRHEA: 0
RECTAL PAIN: 0
BLOOD IN STOOL: 0
EYE REDNESS: 0
STRIDOR: 0
CHOKING: 0
COUGH: 0
EYE DISCHARGE: 0
FACIAL SWELLING: 0
RHINORRHEA: 0
EYE PAIN: 0
TROUBLE SWALLOWING: 0
PHOTOPHOBIA: 0
SORE THROAT: 0

## 2018-06-22 NOTE — PROGRESS NOTES
MOHS SURGERY      MOHS SURGERY Right 12/29/2017    MOHS Repair BCC of Right [place]     PRE-MALIGNANT / BENIGN SKIN LESION EXCISION  4/18/2014    forehead    SKIN BIOPSY  12/29/2017    Biopsy of Face x2 and Right calf x1    SUTURE REMOVAL Right 4/18/2014    Forearm, retained sutures    THYROIDECTOMY Bilateral 05/04/2015    Dr. Tyrone Ayala- total with modified right radical neck dissection    TONSILLECTOMY       History reviewed. No pertinent family history. Subjective:        Review of Systems   Constitutional: Negative for activity change, appetite change, chills, diaphoresis, fatigue, fever and unexpected weight change. HENT: Positive for congestion. Negative for dental problem, drooling, ear discharge, ear pain, facial swelling, hearing loss, mouth sores, nosebleeds, postnasal drip, rhinorrhea, sinus pressure, sneezing, sore throat, tinnitus, trouble swallowing and voice change. Eyes: Negative for photophobia, pain, discharge, redness, itching and visual disturbance. Respiratory: Negative for apnea, cough, choking, chest tightness, shortness of breath, wheezing and stridor. Cardiovascular: Negative for chest pain, palpitations and leg swelling. Gastrointestinal: Negative for abdominal distention, abdominal pain, anal bleeding, blood in stool, constipation, diarrhea, nausea, rectal pain and vomiting. Endocrine: Negative for cold intolerance, heat intolerance, polydipsia, polyphagia and polyuria. Genitourinary: Negative for decreased urine volume, difficulty urinating, dyspareunia, dysuria, enuresis, flank pain, frequency, genital sores, hematuria, menstrual problem, pelvic pain, urgency, vaginal bleeding, vaginal discharge and vaginal pain. Musculoskeletal: Negative for arthralgias, back pain, gait problem, joint swelling, myalgias, neck pain and neck stiffness. Skin: Negative for color change, pallor, rash and wound.    Allergic/Immunologic: Negative for environmental allergies, food allergies and immunocompromised state. Neurological: Negative for dizziness, tremors, seizures, syncope, facial asymmetry, speech difficulty, weakness, light-headedness, numbness and headaches. Hematological: Negative for adenopathy. Does not bruise/bleed easily. Psychiatric/Behavioral: Negative for agitation, behavioral problems, confusion, decreased concentration, dysphoric mood, hallucinations, self-injury, sleep disturbance and suicidal ideas. The patient is not nervous/anxious and is not hyperactive. Objective:     Physical Exam     This is a 48 y.o. female. Patient is alert and oriented to person, place and time. Mood is happy. No respiratory distress. No nasal voice, no hoarseness. Not obviously hearing impaired. Vitals:    06/22/18 1348   BP: 120/80   Pulse: 84   Resp: 16   Temp: 98 °F (36.7 °C)       Head is normocephalic, no obvious masses or lesions. ALLEN, EOM full. Conjunctivae pink, moist, no discharge. External ears are normal: no scars, lesions or masses. R External auditory canal clear and free of any pathology  L External auditory canal clear and free of any pathology   Tympanic membranes:  R intact, translucent                                            L intact, translucent    Nasal bones: intact  Septum:  Deviated, bilateral patent nasal airway  Mucosa:  clear  Turbinates: normal   Discharge:  none    Lips, tongue and oral cavity show tongue is midline, mobile, no lesions. Dentition: good, no malocclusion  Oral mucosa: moist  Tonsils: absent  Oropharynx: normal-appearing mucosa  Hard and soft palates symmetrical and intact. Uvula midline. Gag reflex present  Nasopharynx: not seen    No facial redness, swelling or tenderness. Salivary glands not enlarged.     Neck symmetrical, supple  Cervical adenopathy: no palpable lymphadenopathy  Trachea midline    Chest equal and symmetrical expansion, no retractions    Extremities: no clubbing, cyanosis or edema  Gait

## 2018-08-15 RX ORDER — CETIRIZINE HYDROCHLORIDE 10 MG/1
10 TABLET ORAL DAILY
Qty: 90 TABLET | Refills: 3 | Status: SHIPPED | OUTPATIENT
Start: 2018-08-15 | End: 2021-10-15

## 2021-03-27 ENCOUNTER — IMMUNIZATION (OUTPATIENT)
Dept: PRIMARY CARE CLINIC | Age: 56
End: 2021-03-27
Payer: COMMERCIAL

## 2021-03-27 PROCEDURE — 0001A COVID-19, PFIZER VACCINE 30MCG/0.3ML DOSE: CPT | Performed by: FAMILY MEDICINE

## 2021-03-27 PROCEDURE — 91300 COVID-19, PFIZER VACCINE 30MCG/0.3ML DOSE: CPT | Performed by: FAMILY MEDICINE

## 2021-03-29 ENCOUNTER — VIRTUAL VISIT (OUTPATIENT)
Dept: FAMILY MEDICINE CLINIC | Age: 56
End: 2021-03-29
Payer: COMMERCIAL

## 2021-03-29 DIAGNOSIS — J01.90 ACUTE BACTERIAL SINUSITIS: Primary | ICD-10-CM

## 2021-03-29 DIAGNOSIS — B96.89 ACUTE BACTERIAL SINUSITIS: Primary | ICD-10-CM

## 2021-03-29 PROCEDURE — 99213 OFFICE O/P EST LOW 20 MIN: CPT | Performed by: NURSE PRACTITIONER

## 2021-03-29 RX ORDER — FLUTICASONE PROPIONATE 50 MCG
2 SPRAY, SUSPENSION (ML) NASAL DAILY
Qty: 1 BOTTLE | Refills: 5 | Status: SHIPPED | OUTPATIENT
Start: 2021-03-29 | End: 2021-10-15

## 2021-03-29 RX ORDER — CEFDINIR 300 MG/1
300 CAPSULE ORAL 2 TIMES DAILY
Qty: 20 CAPSULE | Refills: 0 | Status: SHIPPED | OUTPATIENT
Start: 2021-03-29 | End: 2021-04-08

## 2021-03-29 ASSESSMENT — ENCOUNTER SYMPTOMS
SWOLLEN GLANDS: 0
HOARSE VOICE: 0
SORE THROAT: 0
COUGH: 0
SHORTNESS OF BREATH: 0
SINUS PRESSURE: 1

## 2021-03-29 NOTE — PROGRESS NOTES
Mare Pabon (:  1965) is a 50 y.o. female,Established patient, here for evaluation of the following chief complaint(s): Sinusitis and Congestion      ASSESSMENT/PLAN:  1. Acute bacterial sinusitis  -     cefdinir (OMNICEF) 300 MG capsule; Take 1 capsule by mouth 2 times daily for 10 days, Disp-20 capsule, R-0Normal  -     fluticasone (FLONASE) 50 MCG/ACT nasal spray; 2 sprays by Each Nostril route daily, Disp-1 Bottle, R-5Normal  - Rest and increase fluids  - Tylenol as needed for pain and fever  - Add Flonase daily  - OK to use Claritin for drainage   - Good handwashing and clean all surfaces touched  - Call office with any questions or concerns, or if symptoms are getting worse or changing  - ER for any chest pain or SOB      Return if symptoms worsen or fail to improve. SUBJECTIVE/OBJECTIVE:  Pt presents to the office via VV for Sinus pain and pressure for over 2 weeks. Not getting any better, she has tried OTC products, humidifier and sitting up to sleep with minimal relief. She denies any fever or chills. No SOB or chest pain. No change in taste or smell. Sinusitis  This is a new problem. Episode onset: 2 weeks  The problem has been gradually worsening since onset. There has been no fever. The pain is moderate. Associated symptoms include congestion, ear pain, headaches, sinus pressure and sneezing. Pertinent negatives include no chills, coughing, diaphoresis, hoarse voice, neck pain, shortness of breath, sore throat or swollen glands. Past treatments include spray decongestants, oral decongestants, sitting up and acetaminophen. The treatment provided mild relief. Review of Systems   Constitutional: Negative for chills and diaphoresis. HENT: Positive for congestion, ear pain, sinus pressure and sneezing. Negative for hoarse voice and sore throat. Respiratory: Negative for cough and shortness of breath. Musculoskeletal: Negative for neck pain.    Neurological: Positive for headaches. No flowsheet data found. Physical Exam    [INSTRUCTIONS:  \"[x]\" Indicates a positive item  \"[]\" Indicates a negative item  -- DELETE ALL ITEMS NOT EXAMINED]    Constitutional: [x] Appears well-developed and well-nourished [x] No apparent distress      [] Abnormal -     Mental status: [x] Alert and awake  [x] Oriented to person/place/time [x] Able to follow commands    [] Abnormal -     Eyes:   EOM    [x]  Normal    [] Abnormal -   Sclera  [x]  Normal    [] Abnormal -          Discharge [x]  None visible   [] Abnormal -     HENT: [x] Normocephalic, atraumatic  [x] Abnormal - pain with palpation of frontal and maxillary sinus areas. Slightly swollen. [x] Mouth/Throat: Mucous membranes are moist    External Ears [x] Normal  [] Abnormal -    Neck: [x] No visualized mass [] Abnormal -     Pulmonary/Chest: [x] Respiratory effort normal   [x] No visualized signs of difficulty breathing or respiratory distress        [] Abnormal -      Musculoskeletal:   [x] Normal gait with no signs of ataxia         [x] Normal range of motion of neck        [] Abnormal -     Neurological:        [x] No Facial Asymmetry (Cranial nerve 7 motor function) (limited exam due to video visit)          [x] No gaze palsy        [] Abnormal -          Skin:        [x] No significant exanthematous lesions or discoloration noted on facial skin         [] Abnormal -            Psychiatric:       [x] Normal Affect [] Abnormal -        [x] No Hallucinations    Other pertinent observable physical exam findings:- none    [facility]retnay [facility], was evaluated through a synchronous (real-time) audio-video encounter. The patient (or guardian if applicable) is aware that this is a billable service. Verbal consent to proceed has been obtained within the past 12 months.  The visit was conducted pursuant to the emergency declaration under the [address]5 waiver authority and the Coronavirus Preparedness and Response Supplemental Appropriations Act. Patient identification was verified, and a caregiver was present when appropriate. The patient was located in a state where the provider was credentialed to provide care. An electronic signature was used to authenticate this note.     --Geovany Clause, APRN - CNP

## 2021-03-29 NOTE — PATIENT INSTRUCTIONS

## 2021-04-17 ENCOUNTER — IMMUNIZATION (OUTPATIENT)
Dept: PRIMARY CARE CLINIC | Age: 56
End: 2021-04-17
Payer: COMMERCIAL

## 2021-04-17 PROCEDURE — 91300 COVID-19, PFIZER VACCINE 30MCG/0.3ML DOSE: CPT | Performed by: FAMILY MEDICINE

## 2021-04-17 PROCEDURE — 0002A COVID-19, PFIZER VACCINE 30MCG/0.3ML DOSE: CPT | Performed by: FAMILY MEDICINE

## 2021-05-05 ENCOUNTER — TELEPHONE (OUTPATIENT)
Dept: FAMILY MEDICINE CLINIC | Age: 56
End: 2021-05-05

## 2021-05-05 DIAGNOSIS — R09.81 CHRONIC NASAL CONGESTION: Primary | ICD-10-CM

## 2021-05-05 DIAGNOSIS — R44.8 FACIAL PRESSURE: ICD-10-CM

## 2021-05-05 NOTE — TELEPHONE ENCOUNTER
Pt calls in requesting a referral to the ENT. She has a history of \"clogged sinus pressure\" on the side of her right face since her thyroid cancer. She states that she recently finished an antibiotic but still have this going on. Okay for referral to Dr Saad Grijalva.

## 2021-05-05 NOTE — TELEPHONE ENCOUNTER
Noted. [id number] Laila Lantigua for referral to Dr Leticia Murray ENT with [facility] for evaluation. Call office with any questions or concerns, or if symptoms are getting worse or changing.  _WS

## 2021-05-06 ENCOUNTER — VIRTUAL VISIT (OUTPATIENT)
Dept: FAMILY MEDICINE CLINIC | Age: 56
End: 2021-05-06
Payer: COMMERCIAL

## 2021-05-06 DIAGNOSIS — B96.89 ACUTE BACTERIAL SINUSITIS: Primary | ICD-10-CM

## 2021-05-06 DIAGNOSIS — J01.90 ACUTE BACTERIAL SINUSITIS: Primary | ICD-10-CM

## 2021-05-06 DIAGNOSIS — J32.9 RECURRENT SINUS INFECTIONS: ICD-10-CM

## 2021-05-06 PROCEDURE — 99213 OFFICE O/P EST LOW 20 MIN: CPT | Performed by: NURSE PRACTITIONER

## 2021-05-06 RX ORDER — PREDNISONE 10 MG/1
TABLET ORAL
Qty: 30 TABLET | Refills: 0 | Status: SHIPPED | OUTPATIENT
Start: 2021-05-06 | End: 2021-05-16

## 2021-05-06 RX ORDER — AMOXICILLIN AND CLAVULANATE POTASSIUM 875; 125 MG/1; MG/1
1 TABLET, FILM COATED ORAL 2 TIMES DAILY
Qty: 20 TABLET | Refills: 0 | Status: SHIPPED | OUTPATIENT
Start: 2021-05-06 | End: 2021-05-16

## 2021-05-06 ASSESSMENT — ENCOUNTER SYMPTOMS
FACIAL SWELLING: 0
SINUS PRESSURE: 1
HOARSE VOICE: 0
COUGH: 1
SINUS PAIN: 1
TROUBLE SWALLOWING: 0
SORE THROAT: 0
RHINORRHEA: 1
SHORTNESS OF BREATH: 0

## 2021-05-06 NOTE — PATIENT INSTRUCTIONS
Patient Education        Chronic Sinusitis: Care Instructions  Your Care Instructions     Sinusitis is an infection of the lining of the sinus cavities in your head. It causes pain and pressure in your head and face. Sinusitis can be short-term (acute) or long-term (chronic). Chronic sinusitis lasts 12 weeks or longer. It is often caused by a bacterial or fungal infection. Other things, such as allergies, may also be involved. Chronic sinusitis may be hard to treat. It can lead to permanent changes in the mucous membranes that line the sinuses. It may make future sinus infections more likely. The infection may take some time to treat. Antibiotics are usually used if the infection is caused by bacteria. You may also need to use a corticosteroid nasal spray. If the infection is not cured after you try two or more different antibiotics, you may want to talk with your doctor about surgery or allergy testing. If the sinusitis is caused by a fungal infection, you may need to take antifungals or other medicines. You may also need surgery. Follow-up care is a key part of your treatment and safety. Be sure to make and go to all appointments, and call your doctor if you are having problems. It's also a good idea to know your test results and keep a list of the medicines you take. How can you care for yourself at home? Medicines    · Be safe with medicines. Take your medicines exactly as prescribed. Call your doctor if you think you are having a problem with your medicine. You will get more details on the specific medicines your doctor prescribes.     · Take your antibiotics as directed. Do not stop taking them just because you feel better. You need to take the full course of antibiotics.     · Your doctor may recommend a corticosteroid nasal spray, wash, drops, or pills. Take this medicine exactly as prescribed. At home    · Breathe warm, moist air.  You can use a steamy shower, a hot bath, or a sink filled with hot water. Avoid cold, dry air. Using a humidifier in your home may help. Follow the instructions for cleaning the machine.     · Use saline (saltwater) nasal washes every day. This helps keep your nasal passages open. It also can wash out mucus and bacteria. ? You can buy saline nose drops at a grocery store or drugstore. ? You can make your own at home. Add 1 teaspoon of salt and 1 teaspoon of baking soda to 2 cups of distilled water. If you make your own, fill a bulb syringe with the solution. Then insert the tip into your nostril and squeeze gently. Sandre Teetee your nose.     · Put a warm, wet towel or a warm gel pack on your face 3 or 4 times a day. Leave it on 5 to 10 minutes each time.     · Do not smoke or breathe secondhand smoke. Smoking can make sinusitis worse. If you need help quitting, talk to your doctor about stop-smoking programs and medicines. These can increase your chances of quitting for good. When should you call for help? Call your doctor now or seek immediate medical care if:    · You have new or worse symptoms of infection, such as:  ? Increased pain, swelling, warmth, or redness. ? Red streaks leading from the area. ? Pus draining from the area. ? A fever. Watch closely for changes in your health, and be sure to contact your doctor if:    · The mucus from your nose becomes thicker (like pus) or has new blood in it.     · You do not get better as expected. Where can you learn more? Go to [email or web address]://[employer]Keys. org and sign in to your [facility] account. Enter J343 in the [facility] box to learn more about \"Chronic Sinusitis: Care Instructions. \"     If you do not have an account, please click on the \"Sign Up Now\" link. Current as of: December 2, 2020               Content Version: 12.8  © 2006-2021 Healthwise, Incorporated. Care instructions adapted under license by [facility] ([facility]).  If you have questions about a medical condition or this instruction, always ask your healthcare professional. [place] [address] any warranty or liability for your use of this information.

## 2021-05-06 NOTE — PROGRESS NOTES
Manjit Macedo (:  1965) is a 49 y.o. female,Established patient, here for evaluation of the following chief complaint(s): Sinusitis and Allergies      ASSESSMENT/PLAN:  1. Acute bacterial sinusitis  -     amoxicillin-clavulanate (AUGMENTIN) 875-125 MG per tablet; Take 1 tablet by mouth 2 times daily for 10 days, Disp-20 tablet, R-0Normal  -     predniSONE (DELTASONE) 10 MG tablet; 4 po qd for 3 days, then 3 po qd for 3 days, then 2 po qd for 3 days, then 1 po qd for 3 days, Disp-30 tablet, R-0Normal  2. Recurrent sinus infections  -     predniSONE (DELTASONE) 10 MG tablet; 4 po qd for 3 days, then 3 po qd for 3 days, then 2 po qd for 3 days, then 1 po qd for 3 days, Disp-30 tablet, R-0Normal    - Follow up with ENT as planned. Return if symptoms worsen or fail to improve. SUBJECTIVE/OBJECTIVE:  Pt presents to the office today for recurrent sinus issues. Pt does have a referral in to ENT for evaluation, but now she thinks she has another sinus infection. Pain is on her right side of her face. She has had previous surgery to this area and had many lymph nodes removed, some times she gets tingling pain to that area. Pt denies any fever or chills. She has thick green mucous from her nose also. Sinusitis  This is a recurrent problem. The current episode started 1 to 4 weeks ago. The problem has been gradually worsening since onset. There has been no fever. The pain is moderate. Associated symptoms include congestion, coughing, sinus pressure and sneezing. Pertinent negatives include no chills, diaphoresis, ear pain, headaches, hoarse voice, neck pain, shortness of breath or sore throat. Past treatments include sitting up, spray decongestants, oral decongestants and acetaminophen. The treatment provided mild relief. Review of Systems   Constitutional: Negative for chills, diaphoresis, fatigue and fever.    HENT: Positive for congestion, postnasal drip, rhinorrhea, sinus pressure, sinus pain and sneezing. Negative for ear discharge, ear pain, facial swelling, hoarse voice, sore throat and trouble swallowing. Respiratory: Positive for cough. Negative for shortness of breath. Cardiovascular: Negative for chest pain and palpitations. Musculoskeletal: Negative for neck pain. Neurological: Negative for dizziness and headaches. No flowsheet data found. Physical Exam    [INSTRUCTIONS:  \"[x]\" Indicates a positive item  \"[]\" Indicates a negative item  -- DELETE ALL ITEMS NOT EXAMINED]    Constitutional: [x] Appears well-developed and well-nourished [x] No apparent distress      [] Abnormal -     Mental status: [x] Alert and awake  [x] Oriented to person/place/time [x] Able to follow commands    [] Abnormal -     Eyes:   EOM    [x]  Normal    [] Abnormal -   Sclera  [x]  Normal    [] Abnormal -          Discharge [x]  None visible   [] Abnormal -     HENT: [x] Normocephalic, atraumatic  [] Abnormal -   [x] Mouth/Throat: Mucous membranes are moist    External Ears [x] Normal  [] Abnormal -    Neck: [x] No visualized mass [] Abnormal -     Pulmonary/Chest: [x] Respiratory effort normal   [x] No visualized signs of difficulty breathing or respiratory distress        [] Abnormal -      Musculoskeletal:   [x] Normal gait with no signs of ataxia         [x] Normal range of motion of neck        [] Abnormal -     Neurological:        [x] No Facial Asymmetry (Cranial nerve 7 motor function) (limited exam due to video visit)          [x] No gaze palsy        [] Abnormal -          Skin:        [x] No significant exanthematous lesions or discoloration noted on facial skin         [] Abnormal -            Psychiatric:       [x] Normal Affect [] Abnormal -        [x] No Hallucinations    Other pertinent observable physical exam findings:- pain and pressure to right maxillary and frontal sinus area, also pain into her right neck.      Lissa Lily was evaluated through a synchronous (real-time) audio-video encounter. The patient (or guardian if applicable) is aware that this is a billable service. Verbal consent to proceed has been obtained within the past 12 months. The visit was conducted pursuant to the emergency declaration under the [address] [place] [address], [address] [place] [address] authority and the [employer] and [employer] General Act. Patient identification was verified, and a caregiver was present when appropriate. The patient was located in a state where the provider was credentialed to provide care. An electronic signature was used to authenticate this note.     --Scarlett Jacome, ZAMZAM - CNP

## 2021-08-30 ENCOUNTER — TELEPHONE (OUTPATIENT)
Dept: FAMILY MEDICINE CLINIC | Age: 56
End: 2021-08-30

## 2021-08-30 DIAGNOSIS — Z13.220 SCREENING FOR LIPID DISORDERS: ICD-10-CM

## 2021-08-30 DIAGNOSIS — E78.00 HYPERCHOLESTEREMIA: ICD-10-CM

## 2021-08-30 DIAGNOSIS — Z13.1 SCREENING FOR DIABETES MELLITUS: ICD-10-CM

## 2021-08-30 DIAGNOSIS — R73.01 IFG (IMPAIRED FASTING GLUCOSE): ICD-10-CM

## 2021-08-30 DIAGNOSIS — E89.0 POST-SURGICAL HYPOTHYROIDISM: ICD-10-CM

## 2021-08-30 DIAGNOSIS — Z00.00 LABORATORY EXAMINATION ORDERED AS PART OF A ROUTINE GENERAL MEDICAL EXAMINATION: Primary | ICD-10-CM

## 2021-08-30 NOTE — TELEPHONE ENCOUNTER
Pt scheduled for October 15 for her annual visit. She is asking for her annual lab orders.      Okay to mail

## 2021-09-27 ENCOUNTER — E-VISIT (OUTPATIENT)
Dept: PRIMARY CARE CLINIC | Age: 56
End: 2021-09-27
Payer: COMMERCIAL

## 2021-09-27 DIAGNOSIS — B96.89 ACUTE BACTERIAL SINUSITIS: Primary | ICD-10-CM

## 2021-09-27 DIAGNOSIS — J01.90 ACUTE BACTERIAL SINUSITIS: Primary | ICD-10-CM

## 2021-09-27 PROCEDURE — 99422 OL DIG E/M SVC 11-20 MIN: CPT | Performed by: INTERNAL MEDICINE

## 2021-09-27 RX ORDER — AMOXICILLIN AND CLAVULANATE POTASSIUM 875; 125 MG/1; MG/1
1 TABLET, FILM COATED ORAL 2 TIMES DAILY
Qty: 20 TABLET | Refills: 0 | Status: SHIPPED | OUTPATIENT
Start: 2021-09-27 | End: 2021-10-07

## 2021-09-27 RX ORDER — METHYLPREDNISOLONE 4 MG/1
TABLET ORAL
Qty: 1 KIT | Refills: 0 | Status: SHIPPED | OUTPATIENT
Start: 2021-09-27 | End: 2021-10-03

## 2021-09-27 ASSESSMENT — LIFESTYLE VARIABLES
PACKS_PER_DAY: 1
SMOKING_STATUS: NO, I'M A FORMER SMOKER
SMOKING_YEARS: 5

## 2021-09-27 NOTE — PROGRESS NOTES
Please keep your upcoming appointment with ENT. Augmentin and a Medrol dose pack have been sent to the pharmacy for your current symptoms. 11 to 20 minutes were spent on this E visit.

## 2021-10-14 LAB
ABSOLUTE BASO #: 0.1 X10E9/L (ref 0–0.2)
ABSOLUTE EOS #: 0.2 X10E9/L (ref 0–0.4)
ABSOLUTE LYMPH #: 1.6 X10E9/L (ref 1–3.5)
ABSOLUTE MONO #: 0.8 X10E9/L (ref 0–0.9)
ABSOLUTE NEUT #: 6.4 X10E9/L (ref 1.5–6.6)
ALBUMIN SERPL-MCNC: 3.9 G/DL (ref 3.2–5.3)
ALK PHOSPHATASE: 45 U/L (ref 39–130)
ALT SERPL-CCNC: 10 U/L (ref 0–31)
ANION GAP SERPL CALCULATED.3IONS-SCNC: 7 MMOL/L (ref 5–15)
AST SERPL-CCNC: 13 U/L (ref 0–41)
AVERAGE GLUCOSE: 114 MG/DL
BASOPHILS RELATIVE PERCENT: 1 %
BILIRUB SERPL-MCNC: 0.4 MG/DL (ref 0.3–1.2)
BUN BLDV-MCNC: 12 MG/DL (ref 5–23)
CALCIUM SERPL-MCNC: 8 MG/DL (ref 8.5–10.5)
CHLORIDE BLD-SCNC: 105 MMOL/L (ref 98–109)
CHOLESTEROL/HDL RATIO: 3.2 (ref 1–5)
CHOLESTEROL: 214 MG/DL (ref 150–200)
CO2: 25 MMOL/L (ref 22–32)
CREAT SERPL-MCNC: 0.61 MG/DL (ref 0.4–1)
EGFR AFRICAN AMERICAN: >60 ML/MIN/1.73SQ.M
EGFR IF NONAFRICAN AMERICAN: >60 ML/MIN/1.73SQ.M
EOSINOPHILS RELATIVE PERCENT: 2.5 %
GLUCOSE: 100 MG/DL (ref 65–99)
HBA1C MFR BLD: 5.6 % (ref 4.4–6.4)
HCT VFR BLD CALC: 36.4 % (ref 35–47)
HDLC SERPL-MCNC: 67 MG/DL
HEMOGLOBIN: 11.7 G/DL (ref 11.7–15.5)
LDL CHOLESTEROL CALCULATED: 119 MG/DL
LDL/HDL RATIO: 1.8
LYMPHOCYTE %: 17.9 %
MCH RBC QN AUTO: 25.2 PG (ref 27–34)
MCHC RBC AUTO-ENTMCNC: 32 G/DL (ref 32–36)
MCV RBC AUTO: 79 FL (ref 80–100)
MONOCYTES # BLD: 8.5 %
NEUTROPHILS RELATIVE PERCENT: 70.1 %
PDW BLD-RTO: 16.9 % (ref 11.5–15)
PLATELETS: 252 X10E9/L (ref 150–450)
PMV BLD AUTO: 8.9 FL (ref 7–12)
POTASSIUM SERPL-SCNC: 4.1 MMOL/L (ref 3.5–5)
RBC: 4.62 X10E12/L (ref 3.8–5.2)
SODIUM BLD-SCNC: 137 MMOL/L (ref 134–146)
T4 FREE: 0.66 NG/DL (ref 0.61–1.6)
TOTAL PROTEIN: 6.3 G/DL (ref 6–8)
TRIGL SERPL-MCNC: 141 MG/DL (ref 27–150)
TSH SERPL DL<=0.05 MIU/L-ACNC: 2.86 UIU/ML (ref 0.49–4.67)
VLDLC SERPL CALC-MCNC: 28 MG/DL (ref 0–30)
WBC: 9.1 X10E9/L (ref 4–11)

## 2021-10-15 ENCOUNTER — OFFICE VISIT (OUTPATIENT)
Dept: FAMILY MEDICINE CLINIC | Age: 56
End: 2021-10-15
Payer: COMMERCIAL

## 2021-10-15 ENCOUNTER — HOSPITAL ENCOUNTER (OUTPATIENT)
Dept: WOMENS IMAGING | Age: 56
Discharge: HOME | End: 2021-10-15
Payer: COMMERCIAL

## 2021-10-15 VITALS
HEIGHT: 63 IN | BODY MASS INDEX: 37.39 KG/M2 | RESPIRATION RATE: 16 BRPM | SYSTOLIC BLOOD PRESSURE: 120 MMHG | DIASTOLIC BLOOD PRESSURE: 72 MMHG | HEART RATE: 72 BPM | WEIGHT: 211 LBS

## 2021-10-15 DIAGNOSIS — Z00.00 ANNUAL PHYSICAL EXAM: Primary | ICD-10-CM

## 2021-10-15 DIAGNOSIS — C73 PAPILLARY CARCINOMA OF THYROID (HCC): ICD-10-CM

## 2021-10-15 DIAGNOSIS — Z12.31 VISIT FOR SCREENING MAMMOGRAM: ICD-10-CM

## 2021-10-15 DIAGNOSIS — E89.0 POST-SURGICAL HYPOTHYROIDISM: ICD-10-CM

## 2021-10-15 PROCEDURE — 77063 BREAST TOMOSYNTHESIS BI: CPT

## 2021-10-15 PROCEDURE — 99396 PREV VISIT EST AGE 40-64: CPT | Performed by: NURSE PRACTITIONER

## 2021-10-15 ASSESSMENT — ENCOUNTER SYMPTOMS
ABDOMINAL PAIN: 0
VOMITING: 0
COUGH: 0
DIARRHEA: 0
FACIAL SWELLING: 0
WHEEZING: 0
SHORTNESS OF BREATH: 0
SINUS PAIN: 0
COLOR CHANGE: 0
SORE THROAT: 0
NAUSEA: 0
TROUBLE SWALLOWING: 0
EYE PAIN: 0
BACK PAIN: 0

## 2021-10-15 ASSESSMENT — PATIENT HEALTH QUESTIONNAIRE - PHQ9
SUM OF ALL RESPONSES TO PHQ QUESTIONS 1-9: 0
1. LITTLE INTEREST OR PLEASURE IN DOING THINGS: 0
SUM OF ALL RESPONSES TO PHQ QUESTIONS 1-9: 0
SUM OF ALL RESPONSES TO PHQ QUESTIONS 1-9: 0
2. FEELING DOWN, DEPRESSED OR HOPELESS: 0
SUM OF ALL RESPONSES TO PHQ9 QUESTIONS 1 & 2: 0

## 2021-10-15 NOTE — PATIENT INSTRUCTIONS
Patient Education        Hypothyroidism: Care Instructions  Your Care Instructions     When you have hypothyroidism, your body doesn't make enough thyroid hormone. This hormone helps your body use energy. If your thyroid level is low, you may feel tired, be constipated, have an increase in your blood pressure, or have dry skin or memory problems. You may also get cold easily, even when it is warm. Women with low thyroid levels may have heavy menstrual periods. A blood test to find your thyroid-stimulating hormone (TSH) level is used to check for hypothyroidism. A high TSH level may mean that you have it. The treatment for hypothyroidism is thyroid hormone pills. You should start to feel better in 1 to 2 weeks. Most people need treatment for the rest of their lives. You will need regular visits with your doctor to make sure you are doing well and that you have the right dose of medicine. Follow-up care is a key part of your treatment and safety. Be sure to make and go to all appointments, and call your doctor if you are having problems. It's also a good idea to know your test results and keep a list of the medicines you take. How can you care for yourself at home? · Take your thyroid hormone medicine exactly as prescribed. Call your doctor if you think you are having a problem with your medicine. Most people do not have side effects if they take the right amount of medicine regularly. ? Take the medicine 30 minutes before breakfast, and do not take it with calcium, vitamins, or iron. ? Do not take extra doses of your thyroid medicine. It will not help you get better any faster, and it may cause side effects. ? If you forget to take a dose, do NOT take a double dose of medicine. Take your usual dose the next day. · Tell your doctor about all prescription, herbal, or over-the-counter products you take. · Take care of yourself. Eat a healthy diet, get enough sleep, and get regular exercise.   When should you call for help? Call 911 anytime you think you may need emergency care. For example, call if:    · You passed out (lost consciousness).     · You have severe trouble breathing.     · You have a very slow heartbeat (less than 60 beats a minute).     · You have a low body temperature (95°F or below). Call your doctor now or seek immediate medical care if:    · You feel tired, sluggish, or weak.     · You have trouble remembering things or concentrating.     · You do not begin to feel better 2 weeks after starting your medicine. Watch closely for changes in your health, and be sure to contact your doctor if you have any problems. Where can you learn more? Go to https://PlaceFirstpeBetter Placeeb.Eniram. org and sign in to your Sanguine account. Enter J697 in the [employer] box to learn more about \"Hypothyroidism: Care Instructions. \"     If you do not have an account, please click on the \"Sign Up Now\" link. Current as of: December 2, 2020               Content Version: 13.0  © 2006-2021 [employer]. Care instructions adapted under license by [facility] ([facility]). If you have questions about a medical condition or this instruction, always ask your healthcare professional. [place] [address] any warranty or liability for your use of this information. Patient Education        Well Visit, Women 48 to 72: Care Instructions  Overview     Well visits can help you stay healthy. Your doctor has checked your overall health and may have suggested ways to take good care of yourself. Your doctor also may have recommended tests. At home, you can help prevent illness with healthy eating, regular exercise, and other steps. Follow-up care is a key part of your treatment and safety. Be sure to make and go to all appointments, and call your doctor if you are having problems. It's also a good idea to know your test results and keep a list of the medicines you take.   How can you care for yourself at home? · Get screening tests that you and your doctor decide on. Screening helps find diseases before any symptoms appear. · Eat healthy foods. Choose fruits, vegetables, whole grains, protein, and low-fat dairy foods. Limit fat, especially saturated fat. Reduce salt in your diet. · Limit alcohol. Have no more than 1 drink a day or 7 drinks a week. · Get at least 30 minutes of exercise on most days of the week. Walking is a good choice. You also may want to do other activities, such as running, swimming, cycling, or playing tennis or team sports. · Reach and stay at a healthy weight. This will lower your risk for many problems, such as obesity, diabetes, heart disease, and high blood pressure. · Do not smoke. Smoking can make health problems worse. If you need help quitting, talk to your doctor about stop-smoking programs and medicines. These can increase your chances of quitting for good. · Care for your mental health. It is easy to get weighed down by worry and stress. Learn strategies to manage stress, like deep breathing and mindfulness, and stay connected with your family and community. If you find you often feel sad or hopeless, talk with your doctor. Treatment can help. · Talk to your doctor about whether you have any risk factors for sexually transmitted infections (STIs). You can help prevent STIs if you wait to have sex with a new partner (or partners) until you've each been tested for STIs. It also helps if you use condoms (male or female condoms) and if you limit your sex partners to one person who only has sex with you. Vaccines are available for some STIs. · If you think you may have a problem with alcohol or drug use, talk to your doctor. This includes prescription medicines (such as amphetamines and opioids) and illegal drugs (such as cocaine and methamphetamine). Your doctor can help you figure out what type of treatment is best for you. · Protect your skin from too much sun. When you're outdoors from 10 a.m. to 4 p.m., stay in the shade or cover up with clothing and a hat with a wide brim. Wear sunglasses that block UV rays. Even when it's cloudy, put broad-spectrum sunscreen (SPF 30 or higher) on any exposed skin. · See a dentist one or two times a year for checkups and to have your teeth cleaned. · Wear a seat belt in the car. When should you call for help? Watch closely for changes in your health, and be sure to contact your doctor if you have any problems or symptoms that concern you. Where can you learn more? Go to https://FieldLenspeCargomaticeb.Ejoy Technology. org and sign in to your KidzVuz account. Enter Q345 in the [employer] box to learn more about \"Well Visit, Women 50 to 72: Care Instructions. \"     If you do not have an account, please click on the \"Sign Up Now\" link. Current as of: February 11, 2021               Content Version: 13.0  © 2006-2021 Healthwise, Incorporated. Care instructions adapted under license by [facility] ([facility]). If you have questions about a medical condition or this instruction, always ask your healthcare professional. [place] [address] any warranty or liability for your use of this information.

## 2021-10-15 NOTE — PROGRESS NOTES
[address] [facility] [address]  Dept: [phone]  Dept Fax: [phone]  Loc: [phone]     10/15/2021     Natty Mclaughlin (:  1965) is a 49 y.o. female, here for evaluation of the following medical concerns:    Chief Complaint   Patient presents with    Annual Exam     no concerns     Discuss Labs       Pt presents to the office today for her annual visit. Overall feeling good. Having some tingling in her hands, bilateral finger tips. No injury and it comes and goes. No pain. No night symptoms. Hypothyroidism: Recent symptoms: none. She denies weight gain, weight loss, cold intolerance and heat intolerance. Patient is  taking her medication consistently on an empty stomach. Calcium is a little low.       No results found for: [facility]  Lab Results   Component Value Date    TSH 2.86 10/14/2021    TSH 2.400 2016    TSH 2.490 2015     Component      Latest Ref Rng & Units 10/14/2021   WBC      4.0 - 11.0 X10E9/L 9.1   RBC      3.80 - 5.20 X10E12/L 4.62   Hemoglobin Quant      11.7 - 15.5 g/dL 11.7   Hematocrit      35 - 47 % 36.4   MCV      80 - 100 fL 79 (L)   MCH      27 - 34 pg 25.2 (L)   MCHC      32 - 36 g/dL 32.0   RDW      11.5 - 15.0 % 16.9 (H)   Platelet Count      766 - 450 X10E9/L 252   MPV      7 - 12 fL 8.9   Neutrophils %      % 70.1   Absolute Neut #      1.5 - 6.6 X10E9/L 6.4   Lymphocyte %      % 17.9   Absolute Lymph #      1.0 - 3.5 X10E9/L 1.6   Monocytes      % 8.5   Absolute Mono #      0 - 0.9 X10E9/L 0.8   Eosinophils %      % 2.5   Absolute Eos #      0.0 - 0.4 X10E9/L 0.2   Basophils %      % 1.0   Basophils Absolute      0.0 - 0.2 X10E9/L 0.1   Glucose      65 - 99 mg/dL 100 (H)   BUN      5 - 23 mg/dL 12   Creatinine      0.40 - 1.00 mg/dL 0.61   eGFR African American      >59 ml/min/1.73sq.m >60   EGFR IF NonAfrican American      >59 ml/min/1.73sq.m >60   Calcium      8.5 - 10.5 mg/dL 8.0 (L)   Sodium      134 - 146 mmol/L 137   Potassium      3.5 - 5.0 mmol/L 4.1   Chloride      98 - 109 mmol/L 105   CO2      22 - 32 mmol/L 25   Anion Gap      5 - 15 mmol/L 7   Bilirubin      0.3 - 1.2 mg/dL 0.4   Alk Phosphatase      39 - 130 U/L 45   AST      0 - 41 U/L 13   ALT      0 - 31 U/L 10   Total Protein      6.0 - 8.0 g/dL 6.3   Albumin      3.2 - 5.3 g/dL 3.9   Cholesterol      150 - 200 mg/dL 214 (H)   Triglycerides      27 - 150 mg/dL 141   HDL Cholesterol      >39 mg/dL 67   LDL Calculated      <130 mg/dL 119   VLDL Cholesterol Calculated      0 - 30 mg/dL 28   LDl/HDL Ratio      <3.5 1.8   Chol/HDL Ratio      1.0 - 5.0 3.2   Hemoglobin A1C      4.4 - 6.4 % 5.6   AVERAGE GLUCOSE      mg/dL 114   TSH      0.49 - 4.67 uIU/mL 2.86   T4 Free      0.61 - 1.60 ng/dL 0.66     The 10-year ASCVD risk score (Alla Duarte et al., 2013) is: 1.4%    Values used to calculate the score:      Age: 49 years      Sex: Female      Is Non- : No      Diabetic: No      Tobacco smoker: No      Systolic Blood Pressure: 176 mmHg      Is BP treated: No      HDL Cholesterol: 67 mg/dL      Total Cholesterol: 214 mg/dL      Review of Systems   Constitutional: Negative for chills, fatigue and fever. HENT: Negative for congestion, facial swelling, sinus pain, sore throat and trouble swallowing. Eyes: Negative for pain and visual disturbance. Respiratory: Negative for cough, shortness of breath and wheezing. Cardiovascular: Negative for chest pain and palpitations. Gastrointestinal: Negative for abdominal pain, diarrhea, nausea and vomiting. Genitourinary: Negative for difficulty urinating, dysuria and urgency. Musculoskeletal: Negative for back pain, gait problem and neck pain. Skin: Negative for color change and rash. Neurological: Negative for dizziness, weakness and headaches. Psychiatric/Behavioral: Negative for agitation and sleep disturbance.  The patient is not motion without pain and neck supple. Right lower leg: No edema. Left lower leg: No edema. Lymphadenopathy:      Head:      Right side of head: No submental, submandibular, tonsillar, preauricular, posterior auricular or occipital adenopathy. Left side of head: No submental, submandibular, tonsillar, preauricular, posterior auricular or occipital adenopathy. Cervical: No cervical adenopathy. Skin:     General: Skin is warm and dry. Findings: No rash. Neurological:      General: No focal deficit present. Mental Status: She is alert and oriented to person, place, and time. Coordination: Coordination normal.   Psychiatric:         Behavior: Behavior normal.         Thought Content: Thought content normal.         Judgment: Judgment normal.         ASSESSMENT/PLAN:  1. Annual physical exam    2. Papillary carcinoma of thyroid ([facility] [address]    3. Post-surgical hypothyroidism    - Discussed hand tingling. Pt has low calcium level, has followed up with endocrinology in the past and they nicked her parathyroid and told her it would given her trouble with her calcium. She is going to bring this and the hand tingling up with them at her appt in Nov. Increase calcium intake at home. - PAP- Due today with Lamar Johnson CNP.  - Mammogram done today   - Endocrinology follow up Nov 2021. Return in about 1 year (around 10/15/2022), or if symptoms worsen or fail to improve, for Wellness/Physical.    Patient given educational materials - see patient instructions. Discussed use, benefit, and side effects of prescribed medications. All patient questions answered. Pt voiced understanding. Reviewed health maintenance. An electronic signature was used to authenticate this note.     --ZAMZAM Gong - CNP on 10/15/2021 at 1:04 PM

## 2021-10-28 ENCOUNTER — NURSE ONLY (OUTPATIENT)
Dept: LAB | Age: 56
End: 2021-10-28

## 2021-11-30 ENCOUNTER — TELEPHONE (OUTPATIENT)
Dept: FAMILY MEDICINE CLINIC | Age: 56
End: 2021-11-30

## 2021-11-30 ENCOUNTER — OFFICE VISIT (OUTPATIENT)
Dept: ENT CLINIC | Age: 56
End: 2021-11-30
Payer: COMMERCIAL

## 2021-11-30 VITALS
HEART RATE: 88 BPM | BODY MASS INDEX: 36.67 KG/M2 | DIASTOLIC BLOOD PRESSURE: 84 MMHG | OXYGEN SATURATION: 98 % | SYSTOLIC BLOOD PRESSURE: 136 MMHG | RESPIRATION RATE: 14 BRPM | WEIGHT: 207 LBS | TEMPERATURE: 97.1 F

## 2021-11-30 DIAGNOSIS — J32.9 RECURRENT SINUS INFECTIONS: Primary | ICD-10-CM

## 2021-11-30 DIAGNOSIS — J34.3 HYPERTROPHY OF INFERIOR NASAL TURBINATE: ICD-10-CM

## 2021-11-30 DIAGNOSIS — J34.2 DEVIATED NASAL SEPTUM: ICD-10-CM

## 2021-11-30 DIAGNOSIS — G47.33 OBSTRUCTIVE SLEEP APNEA: ICD-10-CM

## 2021-11-30 DIAGNOSIS — J34.89 NASAL OBSTRUCTION: ICD-10-CM

## 2021-11-30 PROCEDURE — 99204 OFFICE O/P NEW MOD 45 MIN: CPT | Performed by: NURSE PRACTITIONER

## 2021-11-30 RX ORDER — MEGESTROL ACETATE 40 MG/1
40 TABLET ORAL 2 TIMES DAILY
COMMUNITY
End: 2021-12-28 | Stop reason: ALTCHOICE

## 2021-11-30 NOTE — TELEPHONE ENCOUNTER
If patient was not hospitalized, nor had monoclonal antibody treatment, patient okay for vaccine booster at this point. ES

## 2021-11-30 NOTE — TELEPHONE ENCOUNTER
Pt calls and states that she was tested positive for covid on 10-28-21. She is scheduled for a hysterectomy January 3, 2022 and is asking when she can/should get her covid booster shot.

## 2021-11-30 NOTE — PROGRESS NOTES
[facility]  [facility] [place]  Dept: [phone]  Dept Fax: [phone]  Loc: [phone]    Jose A Anglin is a 49 y.o. female who was referred by ZAMZAM Rosen - * for:  Chief Complaint   Patient presents with    Other     Patient is here for Chronic nasal congestion Facial pressure Referring Tyler Starr     HPI:     Jose A Anglin is a 49 y.o. female new patient here for evaluation of nasal and sinus issues. Patient seen with myself in office 6/22/2018 for nasal obstruction and had a rush of yellow fluid from the right side of her nose/sinuses. At the time of her visit, she was not symptomatic and plan was for nasal endoscopy on return visit; lost to follow up. Patient reports that she has not had any further discolored nasal drainage or rush of fluid as before. She does have chronic sinus pressure in the right maxillary sinus that more recently feels like it has moved over the ethmoid area. She continues to have difficulty breathing from her nose. She notes dry mouth all of the time and has been mouth breathing more. She does snore obstructively per her family's report. She did see Dr. Joe Verma in sleep clinic in May 2015, notes reviewed; later had PSG on 12/29/2015 with AHI of 8.9. Unfortunately there were some issues with billing for these services and she did not return for follow-up appointment. Patient returns in nasal saline irrigations without improvement, actually feels like it increases her sinus pressure. She has been on multiple courses of antibiotics with Medrol Dosepak at temporarily improves her symptoms. History of papillary thyroid carcinoma s/p total thyroidectomy, central neck dissection and right modified neck dissection 5/4/2015 with Dr Zaire Beyer.   She follows with Dr Samantha Anderson (Endocrinology) at [facility] (further history regarding her thyroid disease in Care 98%   BMI 36.67 kg/m²     PHYSICAL EXAM  Constitutional: Oriented to person, place, and time. Appears stated aged. Appears well-developed and well-nourished. Voice and speech pattern appropriate for age and gender. No distress. No stridor or audible wheezing. HENT:   Head: Normocephalic and atraumatic. Right Ear:  External ear normal. Tympanic membrane intact. Middle ear aerated. Left Ear:  External ear normal. Tympanic membrane intact. Middle ear aerated. Nose:  External nose with slight rightward deformity. Nasal mucosa normal. No lesions or polyps noted. Anterior rhinoscopy shows significantly deviated septum to the left, at least anteriorly, and there are hypertrophic right inferior turbinate causing bilateral nasal obstruction. Mouth/Throat:  Fair dentition. Mallampati III oral aperture. Oral cavity mucosa normal, no masses or lesions noted. Eyes:  Pupils are equal, round, and reactive to light. Conjunctivae and EOM are normal.   Neck:  Normal range of motion. Neck supple. No JVD present. No tracheal deviation present. No cervical lymphadenopathy noted. Here anterior and right lateral neck incision. Chronic numbness of the right submandibular area. Cardiovascular:  Normal rate. Pulmonary/Chest:  Effort normal. No stridor or stertor. No respiratory distress. Musculoskeletal:  Normal range of motion. No edema or lymphadenopathy. Neurological:  Alert and oriented to person, place, and time. Cranial nerve II-XII grossly intact. Skin:  Skin is warm. No erythema. Psychiatric:  Normal mood and affect. Behavior is normal.     Vitals reviewed. Data:  All of the past medical history, past surgical history, family history,social history, allergies and current medications were reviewed with the patient. Assessment & Plan   Diagnoses and all orders for this visit:     Diagnosis Orders   1. Recurrent sinus infections  CT FACIAL BONES WO CONTRAST   2.  Nasal obstruction  CT FACIAL BONES WO CONTRAST   3. Deviated nasal septum  CT FACIAL BONES WO CONTRAST   4. Hypertrophy of inferior nasal turbinate  CT FACIAL BONES WO CONTRAST   5. Obstructive sleep apnea  [address] [facility]       The findings were explained and her questions were answered. Recommend further evaluation patient's sinuses and nasal structures with CT facial bones. This will allow us to determine if this is a structural issue, inflammatory issue is seen with allergies and/or a chronic sinusitis issue. Furthermore, we will be able to evaluate her degree of nasal obstruction structures and determine the the degree of which this affects her obstructive sleep apnea. On return visit, we will plan to perform nasopharyngoscopy to further evaluate the nasopharyngeal inlet, palate and lingual tonsils. Also recommend that patient reconnect with Dr. Ubaldo Fiore to pursue CPAP therapy, assuming she meets the criteria. Patient is agreeable to the plan of care we will plan to see her back in ENT office after her CT facial bones for review. Management of patient's care was collaborated with Dr Yashira Saenz today. Return for results review. **This report has been created using voice recognition software. It may contain minor errors which are inherent in voice recognition technology. **

## 2021-12-17 ENCOUNTER — HOSPITAL ENCOUNTER (OUTPATIENT)
Dept: CT IMAGING | Age: 56
Discharge: HOME | End: 2021-12-17
Payer: COMMERCIAL

## 2021-12-17 DIAGNOSIS — J34.3 HYPERTROPHY OF INFERIOR NASAL TURBINATE: ICD-10-CM

## 2021-12-17 DIAGNOSIS — J34.2 DEVIATED NASAL SEPTUM: ICD-10-CM

## 2021-12-17 DIAGNOSIS — J32.9 RECURRENT SINUS INFECTIONS: ICD-10-CM

## 2021-12-17 DIAGNOSIS — J34.89 NASAL OBSTRUCTION: ICD-10-CM

## 2021-12-17 PROCEDURE — 70486 CT MAXILLOFACIAL W/O DYE: CPT

## 2021-12-24 ENCOUNTER — HOSPITAL ENCOUNTER (OUTPATIENT)
Age: 56
Discharge: HOME | End: 2021-12-24
Payer: COMMERCIAL

## 2021-12-24 LAB
BASOPHILS # BLD: 0.9 %
BASOPHILS ABSOLUTE: 0.1 THOU/MM3 (ref 0–0.1)
EOSINOPHIL # BLD: 1.8 %
EOSINOPHILS ABSOLUTE: 0.1 THOU/MM3 (ref 0–0.4)
ERYTHROCYTE [DISTWIDTH] IN BLOOD BY AUTOMATED COUNT: 14 % (ref 11.5–14.5)
ERYTHROCYTE [DISTWIDTH] IN BLOOD BY AUTOMATED COUNT: 41.2 FL (ref 35–45)
HCT VFR BLD CALC: 34.7 % (ref 37–47)
HEMOGLOBIN: 10.5 GM/DL (ref 12–16)
IMMATURE GRANS (ABS): 0.02 THOU/MM3 (ref 0–0.07)
IMMATURE GRANULOCYTES: 0.2 %
LYMPHOCYTES # BLD: 18.8 %
LYMPHOCYTES ABSOLUTE: 1.5 THOU/MM3 (ref 1–4.8)
MCH RBC QN AUTO: 24.6 PG (ref 26–33)
MCHC RBC AUTO-ENTMCNC: 30.3 GM/DL (ref 32.2–35.5)
MCV RBC AUTO: 81.5 FL (ref 81–99)
MONOCYTES # BLD: 9.6 %
MONOCYTES ABSOLUTE: 0.8 THOU/MM3 (ref 0.4–1.3)
NUCLEATED RED BLOOD CELLS: 0 /100 WBC
PLATELET # BLD: 324 THOU/MM3 (ref 130–400)
PMV BLD AUTO: 10.3 FL (ref 9.4–12.4)
RBC # BLD: 4.26 MILL/MM3 (ref 4.2–5.4)
SEG NEUTROPHILS: 68.7 %
SEGMENTED NEUTROPHILS ABSOLUTE COUNT: 5.6 THOU/MM3 (ref 1.8–7.7)
WBC # BLD: 8.2 THOU/MM3 (ref 4.8–10.8)

## 2021-12-24 PROCEDURE — 36415 COLL VENOUS BLD VENIPUNCTURE: CPT

## 2021-12-24 PROCEDURE — 85025 COMPLETE CBC W/AUTO DIFF WBC: CPT

## 2021-12-28 NOTE — PROGRESS NOTES
In preparation for their surgical procedure above patient was screened for Obstructive Sleep Apnea (LAM) using the STOP-Bang Questionnaire by the Pre-Admission Testing department. This is a pre-surgical screening tool for patient safety and serves as a recommendation, this WILL NOT cause cancellation of surgery. STOP-Bang Questionnaire  * Do you currently see a pulmonologist?  No     If yes STOP, do not complete. Patient follows with  .    1. Do you snore loudly (able to be heard in the next room)? No    2. Do you often feel tired or sleepy during the daytime? No       3. Has anyone ever told you that you stop breathing during your sleep? No    4. Do you have or are you being treated for high blood pressure? No      5. BMI more than 35? BMI (Calculated): 0        No    6. Age over 48 years? 49 y.o. Yes    7. Neck Circumference greater than 17 inches for male or 16 inches for female? Measured           (visits only)            No    8. Gender Male? No      TOTAL SCORE: 1    LAM - Low Risk : Yes to 0 - 2 questions  LAM - Intermediate Risk : Yes to 3 - 4 questions  LAM - High Risk : Yes to 5 - 8 questions    Adapted from:   STOP Questionnaire: A Tool to Screen Patients for Obstructive Sleep Apnea   [facility] Kellie Lopez M.B.B.S., Carlos Muñoz M.D., Shonda Corona. Kalen Boone, Ph.D., Parley Duverney, M.B.B.S., Salbador Barahona M.Sc., Emigdio Eubanks M.D., Nelli Murray. [facility]    Anesthesiology 2008; [id number] Copyright 2008, the [address] of Anesthesiologists, [facility].   ----------------------------------------------------------------------------------------------------------------

## 2021-12-28 NOTE — PROGRESS NOTES
Following instructions given to patient, who states understanding:     NPO after midnight  [employer] and 's license  Wear comfortable clean clothing  Do not bring jewelry   Shower night before and morning of surgery with a liquid antibacterial soap  Bring medications in original bottles  Follow all instructions given by your physician   needed at discharge  Call [phone] for any questions  Report to [facility] on 2nd floor  If you would become ill prior to surgery, please call the surgeon  May have only 1 visitor accompany you for surgery  Please bring and wear mask  You will be receiving a phone call one or two days before surgery to review covid screening exposure     Covid screening questionnaire complete and negative for symptoms or exposure see chart for documentation.    Please limit your exposure to the public after you have your covid test  Please call your doctor immediately if you develop any symptoms of covid prior to your surgery

## 2022-01-04 ENCOUNTER — ANESTHESIA EVENT (OUTPATIENT)
Dept: OPERATING ROOM | Age: 57
DRG: 743 | End: 2022-01-04
Payer: COMMERCIAL

## 2022-01-04 ENCOUNTER — ANESTHESIA (OUTPATIENT)
Dept: OPERATING ROOM | Age: 57
DRG: 743 | End: 2022-01-04
Payer: COMMERCIAL

## 2022-01-04 ENCOUNTER — HOSPITAL ENCOUNTER (INPATIENT)
Age: 57
LOS: 2 days | Discharge: HOME | DRG: 743 | End: 2022-01-06
Attending: STUDENT IN AN ORGANIZED HEALTH CARE EDUCATION/TRAINING PROGRAM | Admitting: STUDENT IN AN ORGANIZED HEALTH CARE EDUCATION/TRAINING PROGRAM
Payer: COMMERCIAL

## 2022-01-04 VITALS — TEMPERATURE: 96.8 F | SYSTOLIC BLOOD PRESSURE: 142 MMHG | OXYGEN SATURATION: 92 % | DIASTOLIC BLOOD PRESSURE: 78 MMHG

## 2022-01-04 DIAGNOSIS — G89.18 POST-OP PAIN: Primary | ICD-10-CM

## 2022-01-04 PROBLEM — D21.9 FIBROIDS: Status: ACTIVE | Noted: 2022-01-04

## 2022-01-04 LAB
ABO: NORMAL
ANTIBODY SCREEN: NORMAL
ERYTHROCYTE [DISTWIDTH] IN BLOOD BY AUTOMATED COUNT: 14 % (ref 11.5–14.5)
ERYTHROCYTE [DISTWIDTH] IN BLOOD BY AUTOMATED COUNT: 40.4 FL (ref 35–45)
HCT VFR BLD CALC: 32.6 % (ref 37–47)
HEMOGLOBIN: 10 GM/DL (ref 12–16)
MCH RBC QN AUTO: 24.3 PG (ref 26–33)
MCHC RBC AUTO-ENTMCNC: 30.7 GM/DL (ref 32.2–35.5)
MCV RBC AUTO: 79.3 FL (ref 81–99)
PLATELET # BLD: 329 THOU/MM3 (ref 130–400)
PMV BLD AUTO: 10.3 FL (ref 9.4–12.4)
PREGNANCY, URINE: NEGATIVE
RBC # BLD: 4.11 MILL/MM3 (ref 4.2–5.4)
RH FACTOR: NORMAL
WBC # BLD: 7.6 THOU/MM3 (ref 4.8–10.8)

## 2022-01-04 PROCEDURE — 7100000010 HC PHASE II RECOVERY - FIRST 15 MIN: Performed by: STUDENT IN AN ORGANIZED HEALTH CARE EDUCATION/TRAINING PROGRAM

## 2022-01-04 PROCEDURE — 2580000003 HC RX 258: Performed by: STUDENT IN AN ORGANIZED HEALTH CARE EDUCATION/TRAINING PROGRAM

## 2022-01-04 PROCEDURE — 2500000003 HC RX 250 WO HCPCS: Performed by: NURSE ANESTHETIST, CERTIFIED REGISTERED

## 2022-01-04 PROCEDURE — 81025 URINE PREGNANCY TEST: CPT

## 2022-01-04 PROCEDURE — 2709999900 HC NON-CHARGEABLE SUPPLY: Performed by: STUDENT IN AN ORGANIZED HEALTH CARE EDUCATION/TRAINING PROGRAM

## 2022-01-04 PROCEDURE — 6370000000 HC RX 637 (ALT 250 FOR IP)

## 2022-01-04 PROCEDURE — 6360000002 HC RX W HCPCS: Performed by: STUDENT IN AN ORGANIZED HEALTH CARE EDUCATION/TRAINING PROGRAM

## 2022-01-04 PROCEDURE — 88307 TISSUE EXAM BY PATHOLOGIST: CPT

## 2022-01-04 PROCEDURE — 0UT90ZZ RESECTION OF UTERUS, OPEN APPROACH: ICD-10-PCS | Performed by: STUDENT IN AN ORGANIZED HEALTH CARE EDUCATION/TRAINING PROGRAM

## 2022-01-04 PROCEDURE — 36415 COLL VENOUS BLD VENIPUNCTURE: CPT

## 2022-01-04 PROCEDURE — 3600000003 HC SURGERY LEVEL 3 BASE: Performed by: STUDENT IN AN ORGANIZED HEALTH CARE EDUCATION/TRAINING PROGRAM

## 2022-01-04 PROCEDURE — 7100000011 HC PHASE II RECOVERY - ADDTL 15 MIN: Performed by: STUDENT IN AN ORGANIZED HEALTH CARE EDUCATION/TRAINING PROGRAM

## 2022-01-04 PROCEDURE — 0UT70ZZ RESECTION OF BILATERAL FALLOPIAN TUBES, OPEN APPROACH: ICD-10-PCS | Performed by: STUDENT IN AN ORGANIZED HEALTH CARE EDUCATION/TRAINING PROGRAM

## 2022-01-04 PROCEDURE — 6370000000 HC RX 637 (ALT 250 FOR IP): Performed by: STUDENT IN AN ORGANIZED HEALTH CARE EDUCATION/TRAINING PROGRAM

## 2022-01-04 PROCEDURE — 3700000001 HC ADD 15 MINUTES (ANESTHESIA): Performed by: STUDENT IN AN ORGANIZED HEALTH CARE EDUCATION/TRAINING PROGRAM

## 2022-01-04 PROCEDURE — 7100000000 HC PACU RECOVERY - FIRST 15 MIN: Performed by: STUDENT IN AN ORGANIZED HEALTH CARE EDUCATION/TRAINING PROGRAM

## 2022-01-04 PROCEDURE — 7100000001 HC PACU RECOVERY - ADDTL 15 MIN: Performed by: STUDENT IN AN ORGANIZED HEALTH CARE EDUCATION/TRAINING PROGRAM

## 2022-01-04 PROCEDURE — 6360000002 HC RX W HCPCS: Performed by: ANESTHESIOLOGY

## 2022-01-04 PROCEDURE — 3600000013 HC SURGERY LEVEL 3 ADDTL 15MIN: Performed by: STUDENT IN AN ORGANIZED HEALTH CARE EDUCATION/TRAINING PROGRAM

## 2022-01-04 PROCEDURE — 86850 RBC ANTIBODY SCREEN: CPT

## 2022-01-04 PROCEDURE — 6360000002 HC RX W HCPCS

## 2022-01-04 PROCEDURE — 86900 BLOOD TYPING SEROLOGIC ABO: CPT

## 2022-01-04 PROCEDURE — 85027 COMPLETE CBC AUTOMATED: CPT

## 2022-01-04 PROCEDURE — 6360000002 HC RX W HCPCS: Performed by: NURSE ANESTHETIST, CERTIFIED REGISTERED

## 2022-01-04 PROCEDURE — 3700000000 HC ANESTHESIA ATTENDED CARE: Performed by: STUDENT IN AN ORGANIZED HEALTH CARE EDUCATION/TRAINING PROGRAM

## 2022-01-04 PROCEDURE — 86901 BLOOD TYPING SEROLOGIC RH(D): CPT

## 2022-01-04 PROCEDURE — 1200000000 HC SEMI PRIVATE

## 2022-01-04 RX ORDER — SODIUM CHLORIDE 9 MG/ML
25 INJECTION, SOLUTION INTRAVENOUS PRN
Status: DISCONTINUED | OUTPATIENT
Start: 2022-01-04 | End: 2022-01-04

## 2022-01-04 RX ORDER — MIDAZOLAM HYDROCHLORIDE 1 MG/ML
INJECTION INTRAMUSCULAR; INTRAVENOUS PRN
Status: DISCONTINUED | OUTPATIENT
Start: 2022-01-04 | End: 2022-01-04 | Stop reason: SDUPTHER

## 2022-01-04 RX ORDER — HYDROMORPHONE HCL 110MG/55ML
PATIENT CONTROLLED ANALGESIA SYRINGE INTRAVENOUS PRN
Status: DISCONTINUED | OUTPATIENT
Start: 2022-01-04 | End: 2022-01-04 | Stop reason: SDUPTHER

## 2022-01-04 RX ORDER — ONDANSETRON 2 MG/ML
INJECTION INTRAMUSCULAR; INTRAVENOUS PRN
Status: DISCONTINUED | OUTPATIENT
Start: 2022-01-04 | End: 2022-01-04 | Stop reason: SDUPTHER

## 2022-01-04 RX ORDER — GLYCOPYRROLATE 1 MG/5 ML
SYRINGE (ML) INTRAVENOUS PRN
Status: DISCONTINUED | OUTPATIENT
Start: 2022-01-04 | End: 2022-01-04 | Stop reason: SDUPTHER

## 2022-01-04 RX ORDER — ONDANSETRON 2 MG/ML
4 INJECTION INTRAMUSCULAR; INTRAVENOUS EVERY 6 HOURS PRN
Status: DISCONTINUED | OUTPATIENT
Start: 2022-01-04 | End: 2022-01-06 | Stop reason: HOSPADM

## 2022-01-04 RX ORDER — SODIUM CHLORIDE 9 MG/ML
INJECTION, SOLUTION INTRAVENOUS CONTINUOUS
Status: DISCONTINUED | OUTPATIENT
Start: 2022-01-04 | End: 2022-01-04

## 2022-01-04 RX ORDER — PROPOFOL 10 MG/ML
INJECTION, EMULSION INTRAVENOUS PRN
Status: DISCONTINUED | OUTPATIENT
Start: 2022-01-04 | End: 2022-01-04 | Stop reason: SDUPTHER

## 2022-01-04 RX ORDER — EPHEDRINE SULFATE/0.9% NACL/PF 50 MG/5 ML
SYRINGE (ML) INTRAVENOUS PRN
Status: DISCONTINUED | OUTPATIENT
Start: 2022-01-04 | End: 2022-01-04 | Stop reason: SDUPTHER

## 2022-01-04 RX ORDER — FENTANYL CITRATE 50 UG/ML
INJECTION, SOLUTION INTRAMUSCULAR; INTRAVENOUS PRN
Status: DISCONTINUED | OUTPATIENT
Start: 2022-01-04 | End: 2022-01-04 | Stop reason: SDUPTHER

## 2022-01-04 RX ORDER — PROMETHAZINE HYDROCHLORIDE 25 MG/ML
12.5 INJECTION, SOLUTION INTRAMUSCULAR; INTRAVENOUS
Status: COMPLETED | OUTPATIENT
Start: 2022-01-04 | End: 2022-01-04

## 2022-01-04 RX ORDER — SCOLOPAMINE TRANSDERMAL SYSTEM 1 MG/1
PATCH, EXTENDED RELEASE TRANSDERMAL
Status: DISCONTINUED
Start: 2022-01-04 | End: 2022-01-06 | Stop reason: HOSPADM

## 2022-01-04 RX ORDER — SODIUM CHLORIDE, SODIUM LACTATE, POTASSIUM CHLORIDE, CALCIUM CHLORIDE 600; 310; 30; 20 MG/100ML; MG/100ML; MG/100ML; MG/100ML
INJECTION, SOLUTION INTRAVENOUS CONTINUOUS
Status: DISCONTINUED | OUTPATIENT
Start: 2022-01-04 | End: 2022-01-06 | Stop reason: HOSPADM

## 2022-01-04 RX ORDER — KETOROLAC TROMETHAMINE 30 MG/ML
INJECTION, SOLUTION INTRAMUSCULAR; INTRAVENOUS
Status: DISCONTINUED
Start: 2022-01-04 | End: 2022-01-04

## 2022-01-04 RX ORDER — SODIUM CHLORIDE 0.9 % (FLUSH) 0.9 %
10 SYRINGE (ML) INJECTION PRN
Status: DISCONTINUED | OUTPATIENT
Start: 2022-01-04 | End: 2022-01-06 | Stop reason: HOSPADM

## 2022-01-04 RX ORDER — FENTANYL CITRATE 50 UG/ML
50 INJECTION, SOLUTION INTRAMUSCULAR; INTRAVENOUS EVERY 5 MIN PRN
Status: DISCONTINUED | OUTPATIENT
Start: 2022-01-04 | End: 2022-01-04

## 2022-01-04 RX ORDER — OXYCODONE HYDROCHLORIDE AND ACETAMINOPHEN 5; 325 MG/1; MG/1
2 TABLET ORAL EVERY 4 HOURS PRN
Status: DISCONTINUED | OUTPATIENT
Start: 2022-01-04 | End: 2022-01-06 | Stop reason: HOSPADM

## 2022-01-04 RX ORDER — SODIUM CHLORIDE 0.9 % (FLUSH) 0.9 %
5-40 SYRINGE (ML) INJECTION EVERY 12 HOURS SCHEDULED
Status: DISCONTINUED | OUTPATIENT
Start: 2022-01-04 | End: 2022-01-04

## 2022-01-04 RX ORDER — LEVOTHYROXINE SODIUM 0.1 MG/1
200 TABLET ORAL DAILY
Status: DISCONTINUED | OUTPATIENT
Start: 2022-01-04 | End: 2022-01-06 | Stop reason: HOSPADM

## 2022-01-04 RX ORDER — SODIUM CHLORIDE 0.9 % (FLUSH) 0.9 %
5-40 SYRINGE (ML) INJECTION PRN
Status: DISCONTINUED | OUTPATIENT
Start: 2022-01-04 | End: 2022-01-04

## 2022-01-04 RX ORDER — LABETALOL 20 MG/4 ML (5 MG/ML) INTRAVENOUS SYRINGE
10 EVERY 10 MIN PRN
Status: DISCONTINUED | OUTPATIENT
Start: 2022-01-04 | End: 2022-01-04

## 2022-01-04 RX ORDER — NEOSTIGMINE METHYLSULFATE 5 MG/5 ML
SYRINGE (ML) INTRAVENOUS PRN
Status: DISCONTINUED | OUTPATIENT
Start: 2022-01-04 | End: 2022-01-04 | Stop reason: SDUPTHER

## 2022-01-04 RX ORDER — SODIUM CHLORIDE 0.9 % (FLUSH) 0.9 %
10 SYRINGE (ML) INJECTION EVERY 12 HOURS SCHEDULED
Status: DISCONTINUED | OUTPATIENT
Start: 2022-01-04 | End: 2022-01-06 | Stop reason: HOSPADM

## 2022-01-04 RX ORDER — DEXAMETHASONE SODIUM PHOSPHATE 10 MG/ML
INJECTION, EMULSION INTRAMUSCULAR; INTRAVENOUS PRN
Status: DISCONTINUED | OUTPATIENT
Start: 2022-01-04 | End: 2022-01-04 | Stop reason: SDUPTHER

## 2022-01-04 RX ORDER — KETOROLAC TROMETHAMINE 30 MG/ML
INJECTION, SOLUTION INTRAMUSCULAR; INTRAVENOUS PRN
Status: DISCONTINUED | OUTPATIENT
Start: 2022-01-04 | End: 2022-01-04 | Stop reason: SDUPTHER

## 2022-01-04 RX ORDER — GLYCOPYRROLATE 1 MG/5 ML
SYRINGE (ML) INTRAVENOUS PRN
Status: DISCONTINUED | OUTPATIENT
Start: 2022-01-04 | End: 2022-01-04

## 2022-01-04 RX ORDER — SODIUM CHLORIDE 9 MG/ML
25 INJECTION, SOLUTION INTRAVENOUS PRN
Status: DISCONTINUED | OUTPATIENT
Start: 2022-01-04 | End: 2022-01-06 | Stop reason: HOSPADM

## 2022-01-04 RX ORDER — ACETAMINOPHEN 325 MG/1
650 TABLET ORAL EVERY 4 HOURS PRN
Status: DISCONTINUED | OUTPATIENT
Start: 2022-01-04 | End: 2022-01-06 | Stop reason: HOSPADM

## 2022-01-04 RX ORDER — OXYCODONE HYDROCHLORIDE AND ACETAMINOPHEN 5; 325 MG/1; MG/1
1 TABLET ORAL EVERY 4 HOURS PRN
Status: DISCONTINUED | OUTPATIENT
Start: 2022-01-04 | End: 2022-01-06 | Stop reason: HOSPADM

## 2022-01-04 RX ORDER — ONDANSETRON 4 MG/1
4 TABLET, ORALLY DISINTEGRATING ORAL EVERY 8 HOURS PRN
Status: DISCONTINUED | OUTPATIENT
Start: 2022-01-04 | End: 2022-01-06 | Stop reason: HOSPADM

## 2022-01-04 RX ORDER — KETOROLAC TROMETHAMINE 30 MG/ML
30 INJECTION, SOLUTION INTRAMUSCULAR; INTRAVENOUS EVERY 6 HOURS
Status: DISCONTINUED | OUTPATIENT
Start: 2022-01-04 | End: 2022-01-06 | Stop reason: HOSPADM

## 2022-01-04 RX ORDER — SCOLOPAMINE TRANSDERMAL SYSTEM 1 MG/1
1 PATCH, EXTENDED RELEASE TRANSDERMAL
Status: DISCONTINUED | OUTPATIENT
Start: 2022-01-04 | End: 2022-01-04

## 2022-01-04 RX ORDER — IBUPROFEN 400 MG/1
800 TABLET ORAL EVERY 8 HOURS PRN
Status: DISCONTINUED | OUTPATIENT
Start: 2022-01-04 | End: 2022-01-06 | Stop reason: HOSPADM

## 2022-01-04 RX ADMIN — PROMETHAZINE HYDROCHLORIDE 12.5 MG: 25 INJECTION INTRAMUSCULAR; INTRAVENOUS at 10:55

## 2022-01-04 RX ADMIN — SODIUM CHLORIDE: 9 INJECTION, SOLUTION INTRAVENOUS at 07:52

## 2022-01-04 RX ADMIN — FENTANYL CITRATE 50 MCG: 50 INJECTION, SOLUTION INTRAMUSCULAR; INTRAVENOUS at 10:24

## 2022-01-04 RX ADMIN — DEXAMETHASONE SODIUM PHOSPHATE 10 MG: 10 INJECTION, EMULSION INTRAMUSCULAR; INTRAVENOUS at 08:44

## 2022-01-04 RX ADMIN — KETOROLAC TROMETHAMINE 30 MG: 30 INJECTION, SOLUTION INTRAMUSCULAR; INTRAVENOUS at 17:14

## 2022-01-04 RX ADMIN — KETOROLAC TROMETHAMINE 30 MG: 30 INJECTION, SOLUTION INTRAMUSCULAR; INTRAVENOUS at 23:11

## 2022-01-04 RX ADMIN — Medication 0.4 MG: at 09:03

## 2022-01-04 RX ADMIN — KETOROLAC TROMETHAMINE 30 MG: 30 INJECTION, SOLUTION INTRAMUSCULAR; INTRAVENOUS at 10:38

## 2022-01-04 RX ADMIN — FENTANYL CITRATE 50 MCG: 50 INJECTION INTRAMUSCULAR; INTRAVENOUS at 11:15

## 2022-01-04 RX ADMIN — OXYCODONE AND ACETAMINOPHEN 2 TABLET: 5; 325 TABLET ORAL at 20:29

## 2022-01-04 RX ADMIN — FENTANYL CITRATE 100 MCG: 50 INJECTION, SOLUTION INTRAMUSCULAR; INTRAVENOUS at 08:44

## 2022-01-04 RX ADMIN — ONDANSETRON 4 MG: 2 INJECTION INTRAMUSCULAR; INTRAVENOUS at 15:11

## 2022-01-04 RX ADMIN — ONDANSETRON 4 MG: 2 INJECTION INTRAMUSCULAR; INTRAVENOUS at 10:19

## 2022-01-04 RX ADMIN — MIDAZOLAM 2 MG: 1 INJECTION INTRAMUSCULAR; INTRAVENOUS at 08:39

## 2022-01-04 RX ADMIN — Medication 10 MG: at 09:05

## 2022-01-04 RX ADMIN — FENTANYL CITRATE 50 MCG: 50 INJECTION INTRAMUSCULAR; INTRAVENOUS at 10:59

## 2022-01-04 RX ADMIN — LEVOTHYROXINE SODIUM 200 MCG: 100 TABLET ORAL at 20:31

## 2022-01-04 RX ADMIN — SODIUM CHLORIDE: 9 INJECTION, SOLUTION INTRAVENOUS at 10:15

## 2022-01-04 RX ADMIN — HYDROMORPHONE HYDROCHLORIDE 1 MG: 2 INJECTION INTRAMUSCULAR; INTRAVENOUS; SUBCUTANEOUS at 10:32

## 2022-01-04 RX ADMIN — CEFAZOLIN 2000 MG: 10 INJECTION, POWDER, FOR SOLUTION INTRAVENOUS at 08:53

## 2022-01-04 RX ADMIN — Medication 4 MG: at 10:15

## 2022-01-04 RX ADMIN — SODIUM CHLORIDE, POTASSIUM CHLORIDE, SODIUM LACTATE AND CALCIUM CHLORIDE: 600; 310; 30; 20 INJECTION, SOLUTION INTRAVENOUS at 15:15

## 2022-01-04 RX ADMIN — HYDROMORPHONE HYDROCHLORIDE 0.5 MG: 1 INJECTION, SOLUTION INTRAMUSCULAR; INTRAVENOUS; SUBCUTANEOUS at 19:11

## 2022-01-04 RX ADMIN — FENTANYL CITRATE 100 MCG: 50 INJECTION, SOLUTION INTRAMUSCULAR; INTRAVENOUS at 08:59

## 2022-01-04 RX ADMIN — PROPOFOL 170 MG: 10 INJECTION, EMULSION INTRAVENOUS at 08:44

## 2022-01-04 RX ADMIN — Medication 0.6 MG: at 10:15

## 2022-01-04 ASSESSMENT — PULMONARY FUNCTION TESTS
PIF_VALUE: 22
PIF_VALUE: 24
PIF_VALUE: 22
PIF_VALUE: 22
PIF_VALUE: 23
PIF_VALUE: 21
PIF_VALUE: 22
PIF_VALUE: 20
PIF_VALUE: 22
PIF_VALUE: 20
PIF_VALUE: 21
PIF_VALUE: 13
PIF_VALUE: 21
PIF_VALUE: 20
PIF_VALUE: 22
PIF_VALUE: 0
PIF_VALUE: 22
PIF_VALUE: 20
PIF_VALUE: 3
PIF_VALUE: 2
PIF_VALUE: 24
PIF_VALUE: 22
PIF_VALUE: 23
PIF_VALUE: 20
PIF_VALUE: 23
PIF_VALUE: 13
PIF_VALUE: 1
PIF_VALUE: 21
PIF_VALUE: 20
PIF_VALUE: 21
PIF_VALUE: 20
PIF_VALUE: 21
PIF_VALUE: 20
PIF_VALUE: 2
PIF_VALUE: 21
PIF_VALUE: 1
PIF_VALUE: 3
PIF_VALUE: 22
PIF_VALUE: 2
PIF_VALUE: 20
PIF_VALUE: 1
PIF_VALUE: 20
PIF_VALUE: 21
PIF_VALUE: 22
PIF_VALUE: 23
PIF_VALUE: 22
PIF_VALUE: 13
PIF_VALUE: 21
PIF_VALUE: 23
PIF_VALUE: 20
PIF_VALUE: 21
PIF_VALUE: 2
PIF_VALUE: 21
PIF_VALUE: 1
PIF_VALUE: 21
PIF_VALUE: 21
PIF_VALUE: 20
PIF_VALUE: 21
PIF_VALUE: 22
PIF_VALUE: 20
PIF_VALUE: 20
PIF_VALUE: 21
PIF_VALUE: 20
PIF_VALUE: 38
PIF_VALUE: 22
PIF_VALUE: 20
PIF_VALUE: 21
PIF_VALUE: 2
PIF_VALUE: 1
PIF_VALUE: 22
PIF_VALUE: 22
PIF_VALUE: 21
PIF_VALUE: 30
PIF_VALUE: 21
PIF_VALUE: 20
PIF_VALUE: 20
PIF_VALUE: 23
PIF_VALUE: 20
PIF_VALUE: 22
PIF_VALUE: 1
PIF_VALUE: 0
PIF_VALUE: 11
PIF_VALUE: 25
PIF_VALUE: 17
PIF_VALUE: 2
PIF_VALUE: 20
PIF_VALUE: 22
PIF_VALUE: 23
PIF_VALUE: 21
PIF_VALUE: 21
PIF_VALUE: 24
PIF_VALUE: 22
PIF_VALUE: 3
PIF_VALUE: 20
PIF_VALUE: 20
PIF_VALUE: 23
PIF_VALUE: 1
PIF_VALUE: 22
PIF_VALUE: 20
PIF_VALUE: 23
PIF_VALUE: 20
PIF_VALUE: 23
PIF_VALUE: 21
PIF_VALUE: 22
PIF_VALUE: 22
PIF_VALUE: 21
PIF_VALUE: 2

## 2022-01-04 ASSESSMENT — PAIN DESCRIPTION - ONSET: ONSET: ON-GOING

## 2022-01-04 ASSESSMENT — PAIN SCALES - GENERAL
PAINLEVEL_OUTOF10: 9
PAINLEVEL_OUTOF10: 9
PAINLEVEL_OUTOF10: 8
PAINLEVEL_OUTOF10: 9
PAINLEVEL_OUTOF10: 9
PAINLEVEL_OUTOF10: 7
PAINLEVEL_OUTOF10: 9

## 2022-01-04 ASSESSMENT — PAIN DESCRIPTION - PAIN TYPE
TYPE: SURGICAL PAIN
TYPE: SURGICAL PAIN

## 2022-01-04 ASSESSMENT — PAIN DESCRIPTION - LOCATION
LOCATION: ABDOMEN;BACK
LOCATION: ABDOMEN;BACK

## 2022-01-04 ASSESSMENT — PAIN DESCRIPTION - PROGRESSION: CLINICAL_PROGRESSION: NOT CHANGED

## 2022-01-04 ASSESSMENT — PAIN - FUNCTIONAL ASSESSMENT: PAIN_FUNCTIONAL_ASSESSMENT: 0-10

## 2022-01-04 ASSESSMENT — PAIN DESCRIPTION - FREQUENCY: FREQUENCY: INTERMITTENT

## 2022-01-04 NOTE — H&P
[address] [address]  History and Physicial      Patient:  Esequiel Reyes  YOB: 1965  MRN: [id number]     Acct: [de-identified]    HISTORY OF PRESENT ILLNESS:     Esequiel Reyes is a 49 y.o. female  admitted for scheduled MARI and possible BSO for enlarged fibroid uterus and menorrhagia. Discussed if ovaries look normal, we will leave them in. If any abnormalities, we will remove them with the uterus. R/B/A of surgery discussed and consent signed. All questions answered.     Obstetric History: # 1 - Date: None, Sex: None, Weight: None, GA: None, Delivery: None, Apgar1: None, Apgar5: None, Living: None, Birth Comments: None    # 2 - Date: None, Sex: None, Weight: None, GA: None, Delivery: None, Apgar1: None, Apgar5: None, Living: None, Birth Comments: None    # 3 - Date: None, Sex: None, Weight: None, GA: None, Delivery: None, Apgar1: None, Apgar5: None, Living: None, Birth Comments: None    # 4 - Date: None, Sex: None, Weight: None, GA: None, Delivery: None, Apgar1: None, Apgar5: None, Living: None, Birth Comments: None    # 5 - Date: None, Sex: None, Weight: None, GA: None, Delivery: None, Apgar1: None, Apgar5: None, Living: None, Birth Comments: None      Past Medical History:        Diagnosis Date    BCC (basal cell carcinoma of skin)     Papillary carcinoma of [facility] [address]     2015- Dr. Yuko ESPINAL (postoperative nausea and vomiting)        Past Surgical History:        Procedure Laterality Date    CHOLECYSTECTOMY      DILATION AND CURETTAGE OF UTERUS      EAR SURGERY Right 2017    MOHS REPAIR BCC OF RIGHT ALA and biopsy of left cheek x2 and right calf x1 performed by Zelalem Salguero MD at [address] [place] [address]  2015    [address] [place] [address] Right 2017    MOHS Repair BCC of Right Ala     PRE-MALIGNANT / BENIGN SKIN LESION EXCISION  2014    forehead    SKIN BIOPSY  2017    Biopsy of Face x2 and Right calf x1    SUTURE REMOVAL Right 4/18/2014    Forearm, retained sutures    THYROIDECTOMY Bilateral 05/04/2015    Dr. Janet Storm- total with modified right radical neck dissection    TONSILLECTOMY         Medications: Scheduled Meds:   sodium chloride flush  5-40 mL IntraVENous 2 times per day    ceFAZolin (ANCEF) IVPB  2,000 mg IntraVENous On Call to OR    scopolamine  1 patch TransDERmal Q72H     Continuous Infusions:   sodium chloride 125 mL/hr at 01/04/22 0752    sodium chloride       PRN Meds:.sodium chloride flush, sodium chloride, promethazine, labetalol, HYDROmorphone, fentanNYL    Allergies:  Tape [adhesive tape]    Social History:    reports that she has quit smoking. She has never used smokeless tobacco. She reports current alcohol use. She reports that she does not use drugs.     Family History:       Problem Relation Age of Onset    Breast Cancer Paternal Grandmother        Review of Systems:  General ROS: negative  Respiratory ROS: negative  Cardiovascular ROS: negative  Gastrointestinal ROS: negative  Musculoskeletal ROS: negative  Neurological ROS: negative    Physical Exam:    Vitals:  Patient Vitals for the past 24 hrs:   BP Temp Temp src Pulse Resp SpO2 Height Weight   01/04/22 0716 (!) 140/67 98.1 °F (36.7 °C) Temporal 84 13 98 % 5' 4\" (1.626 m) 200 lb 9.6 oz (91 kg)          Wt Readings from Last 1 Encounters:   01/04/22 200 lb 9.6 oz (91 kg)         General appearance - alert, well appearing, and in no distress  Neurological - alert, oriented, normal speech, no focal findings or movement disorder noted      Review of Labs and Diagnostic Testing:      CBC:   Lab Results   Component Value Date    WBC 7.6 01/04/2022    RBC 4.11 01/04/2022    RBC 4.62 10/14/2021    HGB 10.0 01/04/2022    HCT 32.6 01/04/2022    MCV 79.3 01/04/2022    RDW 16.9 10/14/2021     01/04/2022     Blood Type:  No results found for: ABOINT  RH:  No results found for: ANATITER, C3, C4,       Radiology:        Assessment:    Patient Active Problem List   Diagnosis    Post-surgical hypothyroidism    Papillary carcinoma of thyroid ([address]    Witnessed apneic spells    Snoring    Obesity (BMI 30-39. 9)    Fatigue    Non-restorative sleep    Bruxism    Morning headache    Immune disorder (HCC)    Claustrophobia    Gastroesophageal reflux disease    Hypercholesteremia    Non morbid obesity due to excess calories         Plan:  1. To OR for MARI, possible BSO  2. Ancef pre-op for ppx  3.  R/B/A discussed and consents signed      @[facility]

## 2022-01-04 NOTE — ANESTHESIA PRE PROCEDURE
Department of Anesthesiology  Preprocedure Note       Name:  Low Sommers   Age:  49 y.o.  :  1965                                          MRN:  [id number]         Date:  2022      Surgeon: Mitesh Yo):  Romy Fan DO    Procedure: Procedure(s): HYSTERECTOMY ABDOMINAL TOTAL BILATERAL SALPINGO-OOPHORECTOMY    Medications prior to admission:   Prior to Admission medications    Medication Sig Start Date End Date Taking? Authorizing Provider   levothyroxine (SYNTHROID) 175 MCG tablet Take 200 mcg by mouth daily 200 mg mon thru sat 100 mcg on akanksha 11/6/15  Yes Historical Provider, MD       Current medications:    Current Facility-Administered Medications   Medication Dose Route Frequency Provider Last Rate Last Admin    0.9 % sodium chloride infusion   IntraVENous Continuous Telma Shaffer DO        sodium chloride flush 0.9 % injection 5-40 mL  5-40 mL IntraVENous 2 times per day Telma Shaffer, DO        sodium chloride flush 0.9 % injection 5-40 mL  5-40 mL IntraVENous PRN Telma Shaffer, DO        0.9 % sodium chloride infusion  25 mL IntraVENous PRN Telma Shaffer DO        ceFAZolin (ANCEF) 2000 mg in dextrose 5 % 50 mL IVPB  2,000 mg IntraVENous On Call to OR Telma Shaffer DO        scopolamine (TRANSDERM-SCOP) transdermal patch 1 patch  1 patch TransDERmal Q72H Telma Shaffer DO        scopolamine (TRANSDERM-SCOP) 1 MG/3DAYS transdermal patch                Allergies: Allergies   Allergen Reactions    Tape Garnette Can Tape] Dermatitis     bandaids       Problem List:    Patient Active Problem List   Diagnosis Code    Post-surgical hypothyroidism E89.0    Papillary carcinoma of thyroid ([facility] [address] C73    Witnessed apneic spells R06.81    Snoring R06.83    Obesity (BMI 30-39. 9) E66.9    Fatigue R53.83    Non-restorative sleep G47.8    Bruxism F45.8    Morning headache R51.9    Immune disorder (HCC) D89.9    Claustrophobia F40.240    Gastroesophageal reflux disease K21.9    Hypercholesteremia E78.00    Non morbid obesity due to excess calories E66.09       Past Medical History:        Diagnosis Date    BCC (basal cell carcinoma of skin)     Papillary carcinoma of thyroid ([address]     5/4/2015- Dr. Rufina Henriquez    PONV (postoperative nausea and vomiting)        Past Surgical History:        Procedure Laterality Date    CHOLECYSTECTOMY      DILATION AND CURETTAGE OF UTERUS      EAR SURGERY Right 12/29/2017    MOHS REPAIR BCC OF RIGHT ALA and biopsy of left cheek x2 and right calf x1 performed by Jose De Jesus Baldwin MD at [address]  05/04/2015    [address] [facility] MOHS SURGERY Right 12/29/2017    MOHS Repair BCC of Right Ala     PRE-MALIGNANT / BENIGN SKIN LESION EXCISION  4/18/2014    forehead    SKIN BIOPSY  12/29/2017    Biopsy of Face x2 and Right calf x1    SUTURE REMOVAL Right 4/18/2014    Forearm, retained sutures    THYROIDECTOMY Bilateral 05/04/2015    Dr. Rufina Henriquez- total with modified right radical neck dissection    TONSILLECTOMY         Social History:    Social History     Tobacco Use    Smoking status: Former Smoker    Smokeless tobacco: Never Used   Substance Use Topics    Alcohol use:  Yes     Alcohol/week: 0.0 standard drinks     Comment: weekly                                Counseling given: Not Answered      Vital Signs (Current):   Vitals:    12/28/21 1127 01/04/22 0716   BP:  (!) 140/67   Pulse:  84   Resp:  13   Temp:  98.1 °F (36.7 °C)   TempSrc:  Temporal   SpO2:  98%   Weight: 197 lb (89.4 kg) 200 lb 9.6 oz (91 kg)   Height:  5' 4\" (1.626 m)                                              BP Readings from Last 3 Encounters:   01/04/22 (!) 140/67   11/30/21 136/84   10/15/21 120/72       NPO Status:                                                                                 BMI:   Wt Readings from Last 3 Encounters:   01/04/22 200 lb 9.6 oz (91 kg)   11/30/21 207 lb (93.9 kg)   10/15/21 211 lb (95.7 kg) Body mass index is 34.43 kg/m². CBC:   Lab Results   Component Value Date    WBC 8.2 12/24/2021    RBC 4.26 12/24/2021    RBC 4.62 10/14/2021    HGB 10.5 12/24/2021    HCT 34.7 12/24/2021    MCV 81.5 12/24/2021    RDW 16.9 10/14/2021     12/24/2021       CMP:   Lab Results   Component Value Date     10/14/2021    K 4.1 10/14/2021     10/14/2021    CO2 25 10/14/2021    BUN 12 10/14/2021    CREATININE 0.61 10/14/2021    LABGLOM >90 12/22/2017    GLUCOSE 100 10/14/2021    PROT 6.3 10/14/2021    CALCIUM 8.0 10/14/2021    BILITOT 0.4 10/14/2021    ALKPHOS 45 10/14/2021    ALKPHOS 54 09/29/2017    AST 13 10/14/2021    ALT 10 10/14/2021       POC Tests: No results for input(s): POCGLU, POCNA, POCK, POCCL, POCBUN, POCHEMO, POCHCT in the last 72 hours. Coags: No results found for: PROTIME, INR, APTT    HCG (If Applicable):   Lab Results   Component Value Date    PREGTESTUR negative 01/04/2022        ABGs: No results found for: PHART, PO2ART, UDM1IFY, MIX3AHU, BEART, O2AQWSVO     Type & Screen (If Applicable):  No results found for: LABABO, LABRH    Drug/Infectious Status (If Applicable):  No results found for: HIV, HEPCAB    COVID-19 Screening (If Applicable): No results found for: COVID19        Anesthesia Evaluation     history of anesthetic complications: PONV. Airway: Mallampati: III  TM distance: <3 FB   Neck ROM: full  Mouth opening: < 3 FB Dental:          Pulmonary:                              Cardiovascular:          ECG reviewed                        Neuro/Psych:   (+) headaches:,             GI/Hepatic/Renal:   (+) GERD:,           Endo/Other:    (+) hypothyroidism::., .                 Abdominal:             Vascular: Other Findings:             Anesthesia Plan      general     ASA 2       Induction: intravenous. MIPS: Postoperative opioids intended and Prophylactic antiemetics administered. Anesthetic plan and risks discussed with patient and spouse.       Plan discussed with MANNIE Guadalupe.  [facility],    1/4/2022

## 2022-01-04 NOTE — OP NOTE
Gyn Service    Operative Report        Pt Name: Aleisha Tanner  MRN: [id number] [place] #: [de-identified]  YOB: 1965  Procedure Performed By: Maddie Khan DO      Pre-operative Diagnosis:  Fibroid uterus, menorrhagia    Post-operative Diagnosis:  SAME    Procedure: Total abdominal hysterectomy, bilateral salpingectomy    Surgeon:  Maddie Khan DO     Assistant: Kamron Ames MD     Anesthesia:  General    Estimated blood loss: 50 ml    Findings:  Uterus enlarged with 6-7cm fibroid off the fundus. Normal appearing fallopian tubes and ovaries bilaterally. Complications:  NONE    Specimens: Uterus and bilateral fallopian tubes    Description of Procedure in Detail  After the risks, benefits, indications, and alternative of the procedure were explained and  understood, informed consent was obtained. The patient was taken to  the operating room in stable condition where general anesthesia was  administered. She was prepped and draped in the usual sterile fashion  and placed in supine position. A Echeverria catheter was inserted. A Pfannenstiel incision was made. The fascia was incised sharply and  extended laterally. Rectus muscles were  in the midline. Peritoneal cavity was entered bluntly and the incision was extended  superiorly and inferiorly. [place] retractor was placed and secured. Bowel  was swept from the pelvis, packed away with moist tapes. The cornua was grasped with Alexandra clamps. The mesosalpinx was clamped below the fallopian tubes was, cut and suture ligated from the distal ends toward the cornua bilaterally. The round ligaments were then  transected. We took the Uteroovarian ligament by creating a window above the ureter and below the vasculature, clamped, transected and suture ligated with 0-Vicryl. The reflection of the bladder peritoneum onto the uterus was then freed by extending the incisions of the anterior leaves of the broad ligaments.  The bladder was  from the lower uterine segment and upper cervix by careful sharp and blunt dissection. We then further skeletonized the uterine vessels with the Metzenbaum scissors. We then switched to curved Alvino clamps at the level of the uterine arteries. 0 Vicryl suture was placed in a Alix fashion to suture ligate the vessels. We continued downward through the cardinal ligaments bilaterally. Pedicles were suture ligated with 0 Vicryl. This was repeated until the level of the lateral vaginal fornix was reached. Once we had entered the vagina on both sides, the Girma scissors were used to cut the rest of the way around the cervix. We then handed off the surgical specimen. We then grasped the anterior and posterior vagina with Kocher clamps. 0 Vicryl suture was then used to close the vaginal cuff in a series of figure of eight sutures. The angles were suture ligated in a Alvino fashion. Good hemostasis was seen. We then irrigated the pelvis. Good hemostasis was confirmed. We then removed all of the packs and Carly retractor. We then closed the peritoneum with 2-0 PDS in a running fashion. We closed the fascia with a running stitch of 1-0 Vicryl. The subcutaneous tissue was closed with 3-0 Vicryl. The skin was closed with 4-0 Vicryl. The patient tolerated the procedure well with no complications. Preoperative antibiotics were administered prior to skin incision and she was  transferred to PACU in stable condition upon completion of the procedure. Instument, sponge and needle counts were correct.     [address]

## 2022-01-04 NOTE — PROGRESS NOTES
1033  Pt. Unresponsive and snoring on adm. To pacu. O2 sat 81% on room air. o2 per nasal cannula applied at 5 liters. Lower abdominal dressing intact and dry. Ice applied. peripad in place. 1035  Pt. Awake and moaning loudly. Pt. Oriented. Pt. Complaining of pain. Pt. Dozes easily. Pt. Encouraged to deep breathe. 1045  Pt. Resting quietly with eyes closed. 1050  Pt. Awake and moaning loudly. Pt. Complains of nausea. 1055  Pt. Medicated for nausea. 1059  Pt. Moaning loudly. Pt. Complains of lower back pain. Pt. States abdominal pain is 9 out of 10. Pt. Medicated for pain. o2 decreased to 2 liters. 1105  Pt. Resting quietly with eyes closed. 1115  Pt. Moaning with eyes closed. Continue to medicate for pain. 1120  Pt. Resting soundly with eyes closed. 1125  Dr. Fouzia Pa to see pt. Pt. Continues to rest quietly with eyes closed. 1145  Pt. Continues to rest quietly with eyes closed. No further complaints of pain or nausea offered. 1150  pacu criteria met. Transfer to [facility].

## 2022-01-04 NOTE — PROGRESS NOTES
Oriented to sds    16     Refuses flu and pneumonia vaccine. Family updated to stay in room. Informed family to take belonging with them if they leave. Keep nothing of value in room unattended. pt was asked and agreed to first name and last initial being put on white boards. Allergy and fall risks applied. SCD Applied to patient. Warming blanket applied to patient. Pt denies any abuse or thoughts of suicide.

## 2022-01-04 NOTE — ANESTHESIA POSTPROCEDURE EVALUATION
Department of Anesthesiology  Postprocedure Note    Patient: Zhanna Rodriguez  MRN: [id number]  YOB: 1965  Date of evaluation: 1/4/2022  Time:  11:34 AM     Procedure Summary     Date: 01/04/22 Room / Location: [address] STEVEN [place]ig[place]    Anesthesia Start: 0839 Anesthesia Stop: 1039    Procedure: HYSTERECTOMY ABDOMINAL TOTAL BILATERAL SALPINGO-OOPHORECTOMY (N/A ) Diagnosis: (fibroids)    Surgeons: Bhaskar Evans DO Responsible Provider: Rick Peguero DO    Anesthesia Type: general ASA Status: 2          Anesthesia Type: general    Tre Phase I: Tre Score: 3    Tre Phase II:      Last vitals: Reviewed and per EMR flowsheets.        Anesthesia Post Evaluation    Patient location during evaluation: PACU  Patient participation: complete - patient participated  Level of consciousness: responsive to verbal stimuli  Airway patency: patent  Nausea & Vomiting: nausea and no vomiting (treated)  Cardiovascular status: hemodynamically stable  Respiratory status: acceptable  Hydration status: stable

## 2022-01-05 LAB
BASOPHILS # BLD: 0.5 %
BASOPHILS ABSOLUTE: 0 THOU/MM3 (ref 0–0.1)
EOSINOPHIL # BLD: 0.2 %
EOSINOPHILS ABSOLUTE: 0 THOU/MM3 (ref 0–0.4)
ERYTHROCYTE [DISTWIDTH] IN BLOOD BY AUTOMATED COUNT: 14.2 % (ref 11.5–14.5)
ERYTHROCYTE [DISTWIDTH] IN BLOOD BY AUTOMATED COUNT: 41.3 FL (ref 35–45)
HCT VFR BLD CALC: 28.2 % (ref 37–47)
HEMOGLOBIN: 8.5 GM/DL (ref 12–16)
IMMATURE GRANS (ABS): 0.03 THOU/MM3 (ref 0–0.07)
IMMATURE GRANULOCYTES: 0.4 %
LYMPHOCYTES # BLD: 18.4 %
LYMPHOCYTES ABSOLUTE: 1.6 THOU/MM3 (ref 1–4.8)
MCH RBC QN AUTO: 24.2 PG (ref 26–33)
MCHC RBC AUTO-ENTMCNC: 30.1 GM/DL (ref 32.2–35.5)
MCV RBC AUTO: 80.3 FL (ref 81–99)
MONOCYTES # BLD: 10.7 %
MONOCYTES ABSOLUTE: 0.9 THOU/MM3 (ref 0.4–1.3)
NUCLEATED RED BLOOD CELLS: 0 /100 WBC
PLATELET # BLD: 258 THOU/MM3 (ref 130–400)
PMV BLD AUTO: 9.9 FL (ref 9.4–12.4)
RBC # BLD: 3.51 MILL/MM3 (ref 4.2–5.4)
SEG NEUTROPHILS: 69.8 %
SEGMENTED NEUTROPHILS ABSOLUTE COUNT: 5.9 THOU/MM3 (ref 1.8–7.7)
WBC # BLD: 8.5 THOU/MM3 (ref 4.8–10.8)

## 2022-01-05 PROCEDURE — 36415 COLL VENOUS BLD VENIPUNCTURE: CPT

## 2022-01-05 PROCEDURE — 6360000002 HC RX W HCPCS: Performed by: STUDENT IN AN ORGANIZED HEALTH CARE EDUCATION/TRAINING PROGRAM

## 2022-01-05 PROCEDURE — 6370000000 HC RX 637 (ALT 250 FOR IP): Performed by: STUDENT IN AN ORGANIZED HEALTH CARE EDUCATION/TRAINING PROGRAM

## 2022-01-05 PROCEDURE — 2580000003 HC RX 258: Performed by: STUDENT IN AN ORGANIZED HEALTH CARE EDUCATION/TRAINING PROGRAM

## 2022-01-05 PROCEDURE — 1200000000 HC SEMI PRIVATE

## 2022-01-05 PROCEDURE — 85025 COMPLETE CBC W/AUTO DIFF WBC: CPT

## 2022-01-05 RX ADMIN — OXYCODONE AND ACETAMINOPHEN 1 TABLET: 5; 325 TABLET ORAL at 09:28

## 2022-01-05 RX ADMIN — KETOROLAC TROMETHAMINE 30 MG: 30 INJECTION, SOLUTION INTRAMUSCULAR; INTRAVENOUS at 22:50

## 2022-01-05 RX ADMIN — OXYCODONE AND ACETAMINOPHEN 2 TABLET: 5; 325 TABLET ORAL at 19:56

## 2022-01-05 RX ADMIN — KETOROLAC TROMETHAMINE 30 MG: 30 INJECTION, SOLUTION INTRAMUSCULAR; INTRAVENOUS at 05:03

## 2022-01-05 RX ADMIN — SODIUM CHLORIDE, POTASSIUM CHLORIDE, SODIUM LACTATE AND CALCIUM CHLORIDE: 600; 310; 30; 20 INJECTION, SOLUTION INTRAVENOUS at 14:57

## 2022-01-05 RX ADMIN — KETOROLAC TROMETHAMINE 30 MG: 30 INJECTION, SOLUTION INTRAMUSCULAR; INTRAVENOUS at 17:25

## 2022-01-05 RX ADMIN — KETOROLAC TROMETHAMINE 30 MG: 30 INJECTION, SOLUTION INTRAMUSCULAR; INTRAVENOUS at 13:11

## 2022-01-05 RX ADMIN — OXYCODONE AND ACETAMINOPHEN 1 TABLET: 5; 325 TABLET ORAL at 15:25

## 2022-01-05 ASSESSMENT — PAIN SCALES - GENERAL
PAINLEVEL_OUTOF10: 7
PAINLEVEL_OUTOF10: 7
PAINLEVEL_OUTOF10: 4
PAINLEVEL_OUTOF10: 8
PAINLEVEL_OUTOF10: 3
PAINLEVEL_OUTOF10: 4
PAINLEVEL_OUTOF10: 7
PAINLEVEL_OUTOF10: 7
PAINLEVEL_OUTOF10: 8
PAINLEVEL_OUTOF10: 6
PAINLEVEL_OUTOF10: 5

## 2022-01-05 ASSESSMENT — PAIN DESCRIPTION - PAIN TYPE: TYPE: SURGICAL PAIN

## 2022-01-05 ASSESSMENT — PAIN DESCRIPTION - LOCATION: LOCATION: ABDOMEN

## 2022-01-05 NOTE — CARE COORDINATION
1/5/22, 7:20 AM EST  DISCHARGE PLANNING EVALUATION:    Katty Cortez       Admitted: 1/4/2022/ [address] day: 1   Location: 2E-01/001-A Reason for admit: Fibroids [D21.9]   PMH:  has a past medical history of BCC (basal cell carcinoma of skin), Papillary carcinoma of thyroid ([address] and PONV (postoperative nausea and vomiting). Procedure:   1/4 Total abdominal hysterectomy, BSO  Barriers to Discharge:  Here for elective procedure, POD #1. IVF. Pain control. PCP: Yosi Gannon MD  Readmission Risk Score: 5 ( )%    Patient Goals/Plan/Treatment Preferences: Spoke with pt, she lives at home with her . She is independent and drives. She works. Denies any DME or HH needs. She has a son that is a nurse at [employer] and one that will be graduating in the Spring as a nurse. She also has a daughter, who is willing to help out. Verified pt's insurance and PCP. Denies discharge needs. Transportation/Food Security/Housekeeping Addressed:  No issues identified.

## 2022-01-05 NOTE — PROGRESS NOTES
Department of Obstetrics and Gynecology  Attending Post Op Progress Note      SUBJECTIVE:  POD 1 s/p MARI and bilateral salpingectomy for fibroids and menorrhagia    OBJECTIVE: Feels fairly well. Pain pretty well controlled with pain meds. She is not nauseated. Has tolerated some regular diet this morning. Mcintosh catheter is still in place with good urine output overnight. Will remove today. Has not yet passed flatus.      VITALS:/72   Pulse 75   Temp 97.5 °F (36.4 °C) (Oral)   Resp 16   Ht 5' 4\" (1.626 m)   Wt 200 lb 9.6 oz (91 kg)   SpO2 95%   BMI 34.43 kg/m²     Urine output:   12hr: 750mL      ABDOMEN:  normal bowel sounds, soft, non-distended, mildly tender around incision  INCISION: Covered, dressing clean and dry  Extremities: No edema, SCDs on    DATA:    CBC:    Lab Results   Component Value Date    HGB 8.5 (L) 01/05/2022    HCT 28.2 (L) 01/05/2022     General Labs:    CBC:   Lab Results   Component Value Date    WBC 8.5 01/05/2022    RBC 3.51 01/05/2022    RBC 4.62 10/14/2021    HGB 8.5 01/05/2022    HCT 28.2 01/05/2022    MCV 80.3 01/05/2022    MCH 24.2 01/05/2022    MCHC 30.1 01/05/2022    RDW 16.9 10/14/2021     01/05/2022    MPV 9.9 01/05/2022         ASSESSMENT & PLAN:    POD 1 from total abdominal hysterectomy and bilateral salpingectomy    Continue routine post-op care  Remove mcintosh  Advance diet as tolerated  Encourage ambulation  PO pain meds in anticipation of discharge tomorrow AM, IV pain meds only as needed  Encouraged incentive spirometer use  SCDs for DVT ppx      Chelsea Shaffer DO 1/5/2022 9:28 AM

## 2022-01-06 VITALS
TEMPERATURE: 97.4 F | SYSTOLIC BLOOD PRESSURE: 133 MMHG | RESPIRATION RATE: 16 BRPM | HEART RATE: 79 BPM | HEIGHT: 64 IN | OXYGEN SATURATION: 96 % | DIASTOLIC BLOOD PRESSURE: 77 MMHG | BODY MASS INDEX: 34.25 KG/M2 | WEIGHT: 200.6 LBS

## 2022-01-06 PROCEDURE — 6370000000 HC RX 637 (ALT 250 FOR IP): Performed by: OBSTETRICS & GYNECOLOGY

## 2022-01-06 PROCEDURE — 6360000002 HC RX W HCPCS: Performed by: STUDENT IN AN ORGANIZED HEALTH CARE EDUCATION/TRAINING PROGRAM

## 2022-01-06 PROCEDURE — 6370000000 HC RX 637 (ALT 250 FOR IP): Performed by: STUDENT IN AN ORGANIZED HEALTH CARE EDUCATION/TRAINING PROGRAM

## 2022-01-06 RX ORDER — IBUPROFEN 800 MG/1
800 TABLET ORAL EVERY 8 HOURS PRN
Qty: 60 TABLET | Refills: 0 | Status: SHIPPED | OUTPATIENT
Start: 2022-01-06 | End: 2022-02-03

## 2022-01-06 RX ORDER — LANOLIN ALCOHOL/MO/W.PET/CERES
325 CREAM (GRAM) TOPICAL 2 TIMES DAILY
Qty: 60 TABLET | Refills: 1 | Status: ON HOLD | OUTPATIENT
Start: 2022-01-06 | End: 2022-03-11

## 2022-01-06 RX ORDER — SIMETHICONE 80 MG
80 TABLET,CHEWABLE ORAL EVERY 6 HOURS PRN
Status: DISCONTINUED | OUTPATIENT
Start: 2022-01-06 | End: 2022-01-06 | Stop reason: HOSPADM

## 2022-01-06 RX ORDER — OXYCODONE HYDROCHLORIDE AND ACETAMINOPHEN 5; 325 MG/1; MG/1
1 TABLET ORAL EVERY 6 HOURS PRN
Qty: 28 TABLET | Refills: 0 | Status: SHIPPED | OUTPATIENT
Start: 2022-01-06 | End: 2022-01-13

## 2022-01-06 RX ADMIN — SIMETHICONE 80 MG: 80 TABLET, CHEWABLE ORAL at 05:16

## 2022-01-06 RX ADMIN — LEVOTHYROXINE SODIUM 200 MCG: 100 TABLET ORAL at 07:31

## 2022-01-06 RX ADMIN — IBUPROFEN 800 MG: 400 TABLET, FILM COATED ORAL at 07:29

## 2022-01-06 RX ADMIN — OXYCODONE AND ACETAMINOPHEN 2 TABLET: 5; 325 TABLET ORAL at 08:30

## 2022-01-06 RX ADMIN — KETOROLAC TROMETHAMINE 30 MG: 30 INJECTION, SOLUTION INTRAMUSCULAR; INTRAVENOUS at 05:07

## 2022-01-06 ASSESSMENT — PAIN SCALES - GENERAL
PAINLEVEL_OUTOF10: 8
PAINLEVEL_OUTOF10: 6
PAINLEVEL_OUTOF10: 3
PAINLEVEL_OUTOF10: 8
PAINLEVEL_OUTOF10: 8

## 2022-01-06 ASSESSMENT — PAIN - FUNCTIONAL ASSESSMENT: PAIN_FUNCTIONAL_ASSESSMENT: PREVENTS OR INTERFERES SOME ACTIVE ACTIVITIES AND ADLS

## 2022-01-06 ASSESSMENT — PAIN DESCRIPTION - FREQUENCY: FREQUENCY: INTERMITTENT

## 2022-01-06 ASSESSMENT — PAIN DESCRIPTION - DESCRIPTORS: DESCRIPTORS: SPASM;SORE

## 2022-01-06 ASSESSMENT — PAIN DESCRIPTION - PAIN TYPE: TYPE: SURGICAL PAIN

## 2022-01-06 ASSESSMENT — PAIN DESCRIPTION - ONSET: ONSET: ON-GOING

## 2022-01-06 ASSESSMENT — PAIN DESCRIPTION - LOCATION: LOCATION: ABDOMEN

## 2022-01-06 ASSESSMENT — PAIN DESCRIPTION - PROGRESSION: CLINICAL_PROGRESSION: GRADUALLY IMPROVING

## 2022-01-06 ASSESSMENT — PAIN DESCRIPTION - ORIENTATION: ORIENTATION: LOWER

## 2022-01-06 NOTE — PLAN OF CARE
Problem: Pain:  Goal: Pain level will decrease  Description: Pain level will decrease  Outcome: Ongoing  Pt reports satisfied with pain medications ordered, pain becomes tolerable. Goal: Control of acute pain  Description: Control of acute pain  Outcome: Ongoing  Goal: Control of chronic pain  Description: Control of chronic pain  Outcome: Ongoing   Care plan reviewed with patient. Patient verbalizes understanding of the plan of care and contributes to goal setting.

## 2022-01-06 NOTE — CARE COORDINATION
1/6/22, 8:44 AM EST    Patient goals/plan/ treatment preferences discussed by  and . Patient goals/plan/ treatment preferences reviewed with patient/ family. Patient/ family verbalize understanding of discharge plan and are in agreement with goal/plan/treatment preferences. Understanding was demonstrated using the teach back method. AVS provided by RN at time of discharge, which includes all necessary medical information pertaining to the patients current course of illness, treatment, post-discharge goals of care, and treatment preferences. Planning home with family today. No needs.      Electronically signed by Cecilia Kerr RN on 1/6/2022 at 8:44 AM

## 2022-01-06 NOTE — PROGRESS NOTES
Discharge instructions reviewed with patient, spouse. All verbalize understanding using teach back. Wheelchair requested for transport to discharge lobby.

## 2022-01-06 NOTE — PROGRESS NOTES
Department of Obstetrics and Gynecology  Attending Post Op Progress Note      SUBJECTIVE:  POD #2 s/p MARI and bilateral salpingectomy for fibroids and menorrhagia    OBJECTIVE: Feels fairly well. In a little more pain this morning than yesterday, but she was up walking quite a bit yesterday. Voiding spontaneously. Has passed flatus. Tolerating regular diet. No other complaints.     VITALS:/77   Pulse 79   Temp 97.4 °F (36.3 °C) (Oral)   Resp 16   Ht 5' 4\" (1.626 m)   Wt 200 lb 9.6 oz (91 kg)   SpO2 96%   BMI 34.43 kg/m²     ABDOMEN:  normal bowel sounds, soft, non-distended  INCISION:covered, dressing clean and dry    DATA:    CBC:    Lab Results   Component Value Date    WBC 8.5 01/05/2022    HGB 8.5 (L) 01/05/2022    HCT 28.2 (L) 01/05/2022     01/05/2022         ASSESSMENT & PLAN:    POD #2 s/p MARI and bilateral salpingectomy   - Discharge home today  - Scripts sent to pharmacy  - Discharge instructions discussed  - Follow up in office in [address] [place] [address]

## 2022-01-25 ENCOUNTER — OFFICE VISIT (OUTPATIENT)
Dept: ENT CLINIC | Age: 57
End: 2022-01-25
Payer: COMMERCIAL

## 2022-01-25 ENCOUNTER — PROCEDURE VISIT (OUTPATIENT)
Dept: ENT CLINIC | Age: 57
End: 2022-01-25
Payer: COMMERCIAL

## 2022-01-25 VITALS
HEIGHT: 64 IN | DIASTOLIC BLOOD PRESSURE: 82 MMHG | SYSTOLIC BLOOD PRESSURE: 140 MMHG | TEMPERATURE: 98 F | WEIGHT: 198.2 LBS | OXYGEN SATURATION: 98 % | HEART RATE: 82 BPM | BODY MASS INDEX: 33.84 KG/M2 | RESPIRATION RATE: 18 BRPM

## 2022-01-25 DIAGNOSIS — J33.0 ANTROCHOANAL POLYP: ICD-10-CM

## 2022-01-25 DIAGNOSIS — J33.8 MAXILLARY POLYP OF SINUS: Primary | ICD-10-CM

## 2022-01-25 DIAGNOSIS — J34.89 NASAL OBSTRUCTION: ICD-10-CM

## 2022-01-25 DIAGNOSIS — G47.33 OBSTRUCTIVE SLEEP APNEA: ICD-10-CM

## 2022-01-25 DIAGNOSIS — J32.9 RECURRENT SINUS INFECTIONS: ICD-10-CM

## 2022-01-25 PROCEDURE — 31231 NASAL ENDOSCOPY DX: CPT | Performed by: OTOLARYNGOLOGY

## 2022-01-25 PROCEDURE — 99214 OFFICE O/P EST MOD 30 MIN: CPT | Performed by: NURSE PRACTITIONER

## 2022-01-25 NOTE — PROGRESS NOTES
[address] [address]  Otolaryngology Head and Neck Surgery  Dr. Celeste Mcintosh MD  Pt Name: James Arriola  MRN: [id number]  [place]: 1965  Date of evaluation: 1/25/2022  Primary Care Physician: Yolanda Rankin MD      Reason for Endoscopy: Nasal obstruction likely related to an antrochoanal polyp    Type of Endoscopy: Rigid nasal endoscopy; right side     anesthesia: Topical Lidocaine after Afrin vasoconstriction    Consent: taken and witnessed        History of Chief Complaint: Right nasal obstruction       No chief complaint on file. Past Medical History   has a past medical history of BCC (basal cell carcinoma of skin), Papillary carcinoma of thyroid ([address] and PONV (postoperative nausea and vomiting). Past Surgical History   has a past surgical history that includes Mohs surgery; Cholecystectomy; Tonsillectomy; Dilation and curettage of uterus; pre-malignant / benign skin lesion excision (4/18/2014); Suture Removal (Right, 4/18/2014); Thyroidectomy (Bilateral, 05/04/2015); lymphadenectomy (05/04/2015); Mohs surgery (Right, 12/29/2017); skin biopsy (12/29/2017); ear surgery (Right, 12/29/2017); and cristin and bso (cervix removed) (N/A, 1/4/2022). Medications  Current Medications:   Current Outpatient Medications   Medication Sig Dispense Refill    ibuprofen (ADVIL;MOTRIN) 800 MG tablet Take 1 tablet by mouth every 8 hours as needed for Pain 60 tablet 0    ferrous sulfate (FE TABS 325) 325 (65 Fe) MG EC tablet Take 1 tablet by mouth 2 times daily 60 tablet 1    levothyroxine (SYNTHROID) 175 MCG tablet Take 200 mcg by mouth daily 200 mg mon thru sat 100 mcg on sunday       No current facility-administered medications for this visit. Home Medications:   Prior to Admission medications    Medication Sig Start Date End Date Taking?  Authorizing Provider   ibuprofen (ADVIL;MOTRIN) 800 MG tablet Take 1 tablet by mouth every 8 hours as needed for Pain 1/6/22   Prasanna Shipley DO   ferrous sulfate (FE TABS 325) 325 (65 Fe) MG EC tablet Take 1 tablet by mouth 2 times daily 1/6/22   Callie Landa DO   levothyroxine (SYNTHROID) 175 MCG tablet Take 200 mcg by mouth daily 200 mg mon thru sat 100 mcg on akanksha 11/6/15   Historical Provider, MD       Allergies  Tape Tawanna Meuse tape]    Family History  family history includes Breast Cancer in her paternal grandmother. Social History  Tobacco use:  reports that she has quit smoking. She has never used smokeless tobacco.  Alcohol use:  reports current alcohol use. Drug use:  reports no history of drug use. Findings:   The patient was highly sensate of the nasal endoscope likely from an insensitivity to topical lidocaine. Nonetheless she was very cooperative and I was able to see well into the right nasal vault. She had what appeared to be an antral polyp coming out of the maxillary sinus ostium and extending out into the right choana with subtotal obstruction. No signs of purulence or generalized polyposis were seen. I attempted to look into the left nasal airway but the patient was too sensitive for my being able to pass the endoscope into that side. IMPRESSIONS:  1. Right antrochoanal polyp; isolated     PLAN:  1. To the operating room for maxillary antrostomy and removal of antrochoanal polyp  2. I will attempt to completely marsupialize the polyp within the sinus itself so as to increase the chances that it will not recur. I will perform a rigid endoscopy following her procedure using tetracaine instead of topical lidocaine.             Keyur Duque MD   Electronically signed 1/25/2022 at 1:11 PM

## 2022-01-25 NOTE — LETTER
[address] [facility] and [address]  Phone: [phone]  Fax: [phone]    Divina Muñoz MD    January 25, 2022     Lilian Song [facility] [address]    Patient: Aleisha Fish   MR Number: [id number]   YOB: 1965   Date of Visit: 1/25/2022       Dear Lilian Song:    Thank you for referring Theodore Donovan to me for evaluation/treatment. Below are the relevant portions of my assessment and plan of care. If you have questions, please do not hesitate to call me. I look forward to following Kezia Kent along with you.     Sincerely,      Divina Muñoz MD

## 2022-01-25 NOTE — PROGRESS NOTES
[facility] [place] [facility]  [facility] EAR, NOSE AND THROAT  [address] [place] [address] [place] [address]  Dept: [phone]  Dept Fax: [phone]  Loc: [phone]    Angelita Silvestre is a 49 y.o. female who was referred by No ref. provider found for:  Chief Complaint   Patient presents with    Follow-up     patient is here for f/u after CT facial bones wo contrast     HPI:     Angelita Silvestre is a 49 y.o. female here for follow up regarding her sinuses. She has persistent right nasal obstruction. No discolored drainage. No allergic symptoms. +snoring. CT sinus 12/17/21 shows large right antrochoanal polyp. Initial HPI 11/30/2021:  Angelita Silvestre is a 49 y.o. female new patient here for evaluation of nasal and sinus issues. Patient seen with myself in office 6/22/2018 for nasal obstruction and had a rush of yellow fluid from the right side of her nose/sinuses. At the time of her visit, she was not symptomatic and plan was for nasal endoscopy on return visit; lost to follow up. Patient reports that she has not had any further discolored nasal drainage or rush of fluid as before. She does have chronic sinus pressure in the right maxillary sinus that more recently feels like it has moved over the ethmoid area. She continues to have difficulty breathing from her nose. She notes dry mouth all of the time and has been mouth breathing more. She does snore obstructively per her family's report. She did see Dr. Bennett Jack in sleep clinic in May 2015, notes reviewed; later had PSG on 12/29/2015 with AHI of 8.9. Unfortunately there were some issues with billing for these services and she did not return for follow-up appointment. Patient returns in nasal saline irrigations without improvement, actually feels like it increases her sinus pressure. She has been on multiple courses of antibiotics with Medrol Dosepak at temporarily improves her symptoms.                 History of papillary thyroid carcinoma s/p total thyroidectomy, central neck dissection and right modified neck dissection 5/4/2015 with Dr Donita Manrique. She follows with Dr Kirk Carvalho (Endocrinology) at [facility] (further history regarding her thyroid disease in Care Everywhere, last visit note 11/16/2021). History: Allergies   Allergen Reactions    Tape Larey Pih Tape] Dermatitis     bandaids     Current Outpatient Medications   Medication Sig Dispense Refill    ibuprofen (ADVIL;MOTRIN) 800 MG tablet Take 1 tablet by mouth every 8 hours as needed for Pain 60 tablet 0    ferrous sulfate (FE TABS 325) 325 (65 Fe) MG EC tablet Take 1 tablet by mouth 2 times daily 60 tablet 1    levothyroxine (SYNTHROID) 175 MCG tablet Take 200 mcg by mouth daily 200 mg mon thru sat 100 mcg on sunday       No current facility-administered medications for this visit.      Past Medical History:   Diagnosis Date    BCC (basal cell carcinoma of skin)     Papillary carcinoma of thyroid ([address]     5/4/2015- Dr. Donita Manrique    PONV (postoperative nausea and vomiting)       Past Surgical History:   Procedure Laterality Date    CHOLECYSTECTOMY      DILATION AND CURETTAGE OF UTERUS      EAR SURGERY Right 12/29/2017    MOHS REPAIR BCC OF RIGHT ALA and biopsy of left cheek x2 and right calf x1 performed by Eliseo Patel MD at [address]  05/04/2015    [address] [facility] MOHS SURGERY Right 12/29/2017    MOHS Repair BCC of Right Ala     PRE-MALIGNANT / BENIGN SKIN LESION EXCISION  4/18/2014    forehead    SKIN BIOPSY  12/29/2017    Biopsy of Face x2 and Right calf x1    SUTURE REMOVAL Right 4/18/2014    Forearm, retained sutures    MARI AND BSO N/A 1/4/2022    HYSTERECTOMY ABDOMINAL TOTAL BILATERAL SALPINGO-OOPHORECTOMY performed by Aftab Horton DO at [address] Bilateral 05/04/2015    Dr. Donita Manrique- total with modified right radical neck dissection    TONSILLECTOMY       Family History   Problem Relation Age of Onset    Breast Cancer Paternal Grandmother      Social History     Tobacco Use    Smoking status: Former Smoker    Smokeless tobacco: Never Used   Substance Use Topics    Alcohol use: Yes     Alcohol/week: 0.0 standard drinks     Comment: weekly        Subjective:      Review of Systems  Rest of review of systems are negative, except as noted in HPI. Objective:     BP (!) 140/82 (Site: Left Upper Arm, Position: Sitting)   Pulse 82   Temp 98 °F (36.7 °C) (Infrared)   Resp 18   Ht 5' 4\" (1.626 m)   Wt 198 lb 3.2 oz (89.9 kg)   SpO2 98%   BMI 34.02 kg/m²     PHYSICAL EXAM  Constitutional: Oriented to person, place, and time. Appears stated aged. Appears well-developed and well-nourished. Voice and speech pattern appropriate for age and gender. No distress. No stridor or audible wheezing. HENT:   Head: Normocephalic and atraumatic. Right Ear:  External ear normal.    Left Ear:  External ear normal.   Nose:  External nose normal. Nasal mucosa normal. No lesions noted. Mouth/Throat:  Good dentition. Mallampati III oral aperture. Oral cavity mucosa normal, no masses or lesions noted. Eyes:  Pupils are equal, round, and reactive to light. Conjunctivae and EOM are normal.   Neck:  Normal range of motion. Neck supple. No JVD present. No tracheal deviation present. No thyromegaly present. No cervical lymphadenopathy noted. Cardiovascular:  Normal rate and rhythm. Pulmonary/Chest:  Effort normal. No stridor or stertor. No respiratory distress. Lung sounds clear throughout. Musculoskeletal:  Normal range of motion. No edema or lymphadenopathy. Neurological:  Alert and oriented to person, place, and time. Cranial nerve II-XII grossly intact. Skin:  Skin is warm. No erythema. Psychiatric:  Normal mood and affect. Behavior is normal.     Vitals reviewed.     Data:  All of the past medical history, past surgical history, family history,social history, allergies and current medications were reviewed with the patient. Assessment & Plan   Diagnoses and all orders for this visit:     Diagnosis Orders   1. Maxillary polyp of sinus  MT NASAL SCOPY,RMV TISS MAXILL SINUS    MT NASAL SCOPE,BX/RMV POLYP/DEBRID   2. Nasal obstruction         The findings were explained and her questions were answered. Management of patient's care was collaborated with Dr Dori Trujillo today. Impression and plan as follows:    IMPRESSIONS:  1. Right antrochoanal polyp; isolated   2. Chronic right nasal obstruction           PLAN:  1. To the operating room for maxillary antrostomy and removal of antrochoanal polyp  2. Will attempt to completely marsupialize the polyp within the sinus itself so as to increase the chances that it will not recur.     **Will perform a rigid endoscopy following her procedure using tetracaine instead of topical lidocaine.                    Return for scheduled surgery. **This report has been created using voice recognition software. It may contain minor errors which are inherent in voice recognition technology. **

## 2022-01-28 ENCOUNTER — PREP FOR PROCEDURE (OUTPATIENT)
Dept: ENT CLINIC | Age: 57
End: 2022-01-28

## 2022-01-28 DIAGNOSIS — Z01.818 PRE-OP TESTING: Primary | ICD-10-CM

## 2022-01-28 NOTE — PROGRESS NOTES
I notified Josselyn of patient's last HGB/HCT of 8.5/28.2 post op day 1 S/P MARI BSO; Laurie First will order a repeat CBC pre op-. Thank you.

## 2022-01-28 NOTE — PROGRESS NOTES
PAT call attempted, patient unavailable, left message to please call us back at your earliest convenience; [phone]

## 2022-01-31 ENCOUNTER — NURSE ONLY (OUTPATIENT)
Dept: LAB | Age: 57
End: 2022-01-31

## 2022-01-31 DIAGNOSIS — Z01.818 PRE-OP TESTING: ICD-10-CM

## 2022-01-31 LAB
BASOPHILS # BLD: 1 %
BASOPHILS ABSOLUTE: 0.1 THOU/MM3 (ref 0–0.1)
EOSINOPHIL # BLD: 3.1 %
EOSINOPHILS ABSOLUTE: 0.2 THOU/MM3 (ref 0–0.4)
ERYTHROCYTE [DISTWIDTH] IN BLOOD BY AUTOMATED COUNT: 16.9 % (ref 11.5–14.5)
ERYTHROCYTE [DISTWIDTH] IN BLOOD BY AUTOMATED COUNT: 47.7 FL (ref 35–45)
HCT VFR BLD CALC: 36.8 % (ref 37–47)
HEMOGLOBIN: 11.3 GM/DL (ref 12–16)
IMMATURE GRANS (ABS): 0.02 THOU/MM3 (ref 0–0.07)
IMMATURE GRANULOCYTES: 0.3 %
LYMPHOCYTES # BLD: 30.6 %
LYMPHOCYTES ABSOLUTE: 2.1 THOU/MM3 (ref 1–4.8)
MCH RBC QN AUTO: 24.5 PG (ref 26–33)
MCHC RBC AUTO-ENTMCNC: 30.7 GM/DL (ref 32.2–35.5)
MCV RBC AUTO: 79.8 FL (ref 81–99)
MONOCYTES # BLD: 9.2 %
MONOCYTES ABSOLUTE: 0.6 THOU/MM3 (ref 0.4–1.3)
NUCLEATED RED BLOOD CELLS: 0 /100 WBC
PLATELET # BLD: 349 THOU/MM3 (ref 130–400)
PMV BLD AUTO: 10.7 FL (ref 9.4–12.4)
RBC # BLD: 4.61 MILL/MM3 (ref 4.2–5.4)
SEG NEUTROPHILS: 55.8 %
SEGMENTED NEUTROPHILS ABSOLUTE COUNT: 3.8 THOU/MM3 (ref 1.8–7.7)
WBC # BLD: 6.8 THOU/MM3 (ref 4.8–10.8)

## 2022-02-04 ENCOUNTER — ANESTHESIA EVENT (OUTPATIENT)
Dept: OPERATING ROOM | Age: 57
End: 2022-02-04
Payer: COMMERCIAL

## 2022-02-04 ENCOUNTER — ANESTHESIA (OUTPATIENT)
Dept: OPERATING ROOM | Age: 57
End: 2022-02-04
Payer: COMMERCIAL

## 2022-02-04 ENCOUNTER — HOSPITAL ENCOUNTER (OUTPATIENT)
Age: 57
Setting detail: OUTPATIENT SURGERY
Discharge: HOME | End: 2022-02-04
Attending: OTOLARYNGOLOGY | Admitting: OTOLARYNGOLOGY
Payer: COMMERCIAL

## 2022-02-04 VITALS
HEART RATE: 86 BPM | OXYGEN SATURATION: 95 % | BODY MASS INDEX: 33.09 KG/M2 | SYSTOLIC BLOOD PRESSURE: 133 MMHG | DIASTOLIC BLOOD PRESSURE: 72 MMHG | WEIGHT: 193.8 LBS | RESPIRATION RATE: 16 BRPM | TEMPERATURE: 98.2 F | HEIGHT: 64 IN

## 2022-02-04 VITALS — DIASTOLIC BLOOD PRESSURE: 70 MMHG | OXYGEN SATURATION: 99 % | SYSTOLIC BLOOD PRESSURE: 134 MMHG | TEMPERATURE: 99 F

## 2022-02-04 DIAGNOSIS — G89.18 ACUTE POST-OPERATIVE PAIN: Primary | ICD-10-CM

## 2022-02-04 PROCEDURE — 6370000000 HC RX 637 (ALT 250 FOR IP): Performed by: OTOLARYNGOLOGY

## 2022-02-04 PROCEDURE — 2709999900 HC NON-CHARGEABLE SUPPLY: Performed by: OTOLARYNGOLOGY

## 2022-02-04 PROCEDURE — 7100000000 HC PACU RECOVERY - FIRST 15 MIN: Performed by: OTOLARYNGOLOGY

## 2022-02-04 PROCEDURE — 2580000003 HC RX 258: Performed by: NURSE ANESTHETIST, CERTIFIED REGISTERED

## 2022-02-04 PROCEDURE — 6360000002 HC RX W HCPCS: Performed by: NURSE ANESTHETIST, CERTIFIED REGISTERED

## 2022-02-04 PROCEDURE — 3700000000 HC ANESTHESIA ATTENDED CARE: Performed by: OTOLARYNGOLOGY

## 2022-02-04 PROCEDURE — 88304 TISSUE EXAM BY PATHOLOGIST: CPT

## 2022-02-04 PROCEDURE — 3700000001 HC ADD 15 MINUTES (ANESTHESIA): Performed by: OTOLARYNGOLOGY

## 2022-02-04 PROCEDURE — APPNB45 APP NON BILLABLE 31-45 MINUTES: Performed by: NURSE PRACTITIONER

## 2022-02-04 PROCEDURE — 88300 SURGICAL PATH GROSS: CPT

## 2022-02-04 PROCEDURE — 2580000003 HC RX 258: Performed by: OTOLARYNGOLOGY

## 2022-02-04 PROCEDURE — 3600000014 HC SURGERY LEVEL 4 ADDTL 15MIN: Performed by: OTOLARYNGOLOGY

## 2022-02-04 PROCEDURE — 2500000003 HC RX 250 WO HCPCS: Performed by: NURSE ANESTHETIST, CERTIFIED REGISTERED

## 2022-02-04 PROCEDURE — 88305 TISSUE EXAM BY PATHOLOGIST: CPT

## 2022-02-04 PROCEDURE — 7100000011 HC PHASE II RECOVERY - ADDTL 15 MIN: Performed by: OTOLARYNGOLOGY

## 2022-02-04 PROCEDURE — 7100000010 HC PHASE II RECOVERY - FIRST 15 MIN: Performed by: OTOLARYNGOLOGY

## 2022-02-04 PROCEDURE — 3600000004 HC SURGERY LEVEL 4 BASE: Performed by: OTOLARYNGOLOGY

## 2022-02-04 PROCEDURE — 2720000010 HC SURG SUPPLY STERILE: Performed by: OTOLARYNGOLOGY

## 2022-02-04 PROCEDURE — 7100000001 HC PACU RECOVERY - ADDTL 15 MIN: Performed by: OTOLARYNGOLOGY

## 2022-02-04 RX ORDER — DOCUSATE SODIUM 100 MG/1
100 CAPSULE, LIQUID FILLED ORAL 2 TIMES DAILY
Status: ON HOLD | COMMUNITY
End: 2022-03-11

## 2022-02-04 RX ORDER — FENTANYL CITRATE 50 UG/ML
INJECTION, SOLUTION INTRAMUSCULAR; INTRAVENOUS PRN
Status: DISCONTINUED | OUTPATIENT
Start: 2022-02-04 | End: 2022-02-04 | Stop reason: SDUPTHER

## 2022-02-04 RX ORDER — DIPHENHYDRAMINE HYDROCHLORIDE 50 MG/ML
12.5 INJECTION INTRAMUSCULAR; INTRAVENOUS
Status: DISCONTINUED | OUTPATIENT
Start: 2022-02-04 | End: 2022-02-04 | Stop reason: HOSPADM

## 2022-02-04 RX ORDER — GLYCOPYRROLATE 1 MG/5 ML
SYRINGE (ML) INTRAVENOUS PRN
Status: DISCONTINUED | OUTPATIENT
Start: 2022-02-04 | End: 2022-02-04 | Stop reason: SDUPTHER

## 2022-02-04 RX ORDER — ONDANSETRON 2 MG/ML
INJECTION INTRAMUSCULAR; INTRAVENOUS PRN
Status: DISCONTINUED | OUTPATIENT
Start: 2022-02-04 | End: 2022-02-04 | Stop reason: SDUPTHER

## 2022-02-04 RX ORDER — PROPOFOL 10 MG/ML
INJECTION, EMULSION INTRAVENOUS PRN
Status: DISCONTINUED | OUTPATIENT
Start: 2022-02-04 | End: 2022-02-04 | Stop reason: SDUPTHER

## 2022-02-04 RX ORDER — PROMETHAZINE HYDROCHLORIDE 25 MG/ML
12.5 INJECTION, SOLUTION INTRAMUSCULAR; INTRAVENOUS
Status: DISCONTINUED | OUTPATIENT
Start: 2022-02-04 | End: 2022-02-04 | Stop reason: HOSPADM

## 2022-02-04 RX ORDER — MIDAZOLAM HYDROCHLORIDE 1 MG/ML
INJECTION INTRAMUSCULAR; INTRAVENOUS PRN
Status: DISCONTINUED | OUTPATIENT
Start: 2022-02-04 | End: 2022-02-04 | Stop reason: SDUPTHER

## 2022-02-04 RX ORDER — CEFAZOLIN SODIUM 1 G/3ML
INJECTION, POWDER, FOR SOLUTION INTRAMUSCULAR; INTRAVENOUS PRN
Status: DISCONTINUED | OUTPATIENT
Start: 2022-02-04 | End: 2022-02-04 | Stop reason: SDUPTHER

## 2022-02-04 RX ORDER — HYDROCODONE BITARTRATE AND ACETAMINOPHEN 5; 325 MG/1; MG/1
1 TABLET ORAL EVERY 4 HOURS PRN
Qty: 30 TABLET | Refills: 0 | Status: SHIPPED | OUTPATIENT
Start: 2022-02-04 | End: 2022-02-09

## 2022-02-04 RX ORDER — HYDROMORPHONE HCL 110MG/55ML
PATIENT CONTROLLED ANALGESIA SYRINGE INTRAVENOUS PRN
Status: DISCONTINUED | OUTPATIENT
Start: 2022-02-04 | End: 2022-02-04 | Stop reason: SDUPTHER

## 2022-02-04 RX ORDER — FENTANYL CITRATE 50 UG/ML
50 INJECTION, SOLUTION INTRAMUSCULAR; INTRAVENOUS EVERY 5 MIN PRN
Status: DISCONTINUED | OUTPATIENT
Start: 2022-02-04 | End: 2022-02-04 | Stop reason: HOSPADM

## 2022-02-04 RX ORDER — METOCLOPRAMIDE HYDROCHLORIDE 5 MG/ML
10 INJECTION INTRAMUSCULAR; INTRAVENOUS
Status: DISCONTINUED | OUTPATIENT
Start: 2022-02-04 | End: 2022-02-04 | Stop reason: HOSPADM

## 2022-02-04 RX ORDER — MEPERIDINE HYDROCHLORIDE 25 MG/ML
12.5 INJECTION INTRAMUSCULAR; INTRAVENOUS; SUBCUTANEOUS EVERY 5 MIN PRN
Status: DISCONTINUED | OUTPATIENT
Start: 2022-02-04 | End: 2022-02-04 | Stop reason: HOSPADM

## 2022-02-04 RX ORDER — SODIUM CHLORIDE 9 MG/ML
INJECTION, SOLUTION INTRAVENOUS CONTINUOUS
Status: DISCONTINUED | OUTPATIENT
Start: 2022-02-04 | End: 2022-02-04 | Stop reason: HOSPADM

## 2022-02-04 RX ORDER — DEXAMETHASONE SODIUM PHOSPHATE 10 MG/ML
INJECTION, EMULSION INTRAMUSCULAR; INTRAVENOUS PRN
Status: DISCONTINUED | OUTPATIENT
Start: 2022-02-04 | End: 2022-02-04 | Stop reason: SDUPTHER

## 2022-02-04 RX ORDER — ROCURONIUM BROMIDE 10 MG/ML
INJECTION, SOLUTION INTRAVENOUS PRN
Status: DISCONTINUED | OUTPATIENT
Start: 2022-02-04 | End: 2022-02-04 | Stop reason: SDUPTHER

## 2022-02-04 RX ORDER — LABETALOL 20 MG/4 ML (5 MG/ML) INTRAVENOUS SYRINGE
5 EVERY 10 MIN PRN
Status: DISCONTINUED | OUTPATIENT
Start: 2022-02-04 | End: 2022-02-04 | Stop reason: HOSPADM

## 2022-02-04 RX ORDER — FENTANYL CITRATE 50 UG/ML
25 INJECTION, SOLUTION INTRAMUSCULAR; INTRAVENOUS EVERY 5 MIN PRN
Status: DISCONTINUED | OUTPATIENT
Start: 2022-02-04 | End: 2022-02-04 | Stop reason: HOSPADM

## 2022-02-04 RX ORDER — NEOSTIGMINE METHYLSULFATE 5 MG/5 ML
SYRINGE (ML) INTRAVENOUS PRN
Status: DISCONTINUED | OUTPATIENT
Start: 2022-02-04 | End: 2022-02-04 | Stop reason: SDUPTHER

## 2022-02-04 RX ORDER — CIPROFLOXACIN 500 MG/1
500 TABLET, FILM COATED ORAL 2 TIMES DAILY
Qty: 20 TABLET | Refills: 0 | Status: SHIPPED | OUTPATIENT
Start: 2022-02-04 | End: 2022-02-14

## 2022-02-04 RX ORDER — HYDROCODONE BITARTRATE AND ACETAMINOPHEN 5; 325 MG/1; MG/1
1 TABLET ORAL ONCE
Status: COMPLETED | OUTPATIENT
Start: 2022-02-04 | End: 2022-02-04

## 2022-02-04 RX ORDER — SODIUM CHLORIDE, SODIUM LACTATE, POTASSIUM CHLORIDE, CALCIUM CHLORIDE 600; 310; 30; 20 MG/100ML; MG/100ML; MG/100ML; MG/100ML
INJECTION, SOLUTION INTRAVENOUS CONTINUOUS PRN
Status: DISCONTINUED | OUTPATIENT
Start: 2022-02-04 | End: 2022-02-04 | Stop reason: SDUPTHER

## 2022-02-04 RX ADMIN — Medication 3 MG: at 16:13

## 2022-02-04 RX ADMIN — Medication 0.6 MG: at 16:13

## 2022-02-04 RX ADMIN — HYDROMORPHONE HYDROCHLORIDE 0.5 MG: 2 INJECTION INTRAMUSCULAR; INTRAVENOUS; SUBCUTANEOUS at 15:46

## 2022-02-04 RX ADMIN — PROPOFOL 200 MG: 10 INJECTION, EMULSION INTRAVENOUS at 14:10

## 2022-02-04 RX ADMIN — HYDROCODONE BITARTRATE AND ACETAMINOPHEN 1 TABLET: 5; 325 TABLET ORAL at 17:03

## 2022-02-04 RX ADMIN — CEFAZOLIN 2000 MG: 1 INJECTION, POWDER, FOR SOLUTION INTRAMUSCULAR; INTRAVENOUS at 14:24

## 2022-02-04 RX ADMIN — MIDAZOLAM 2 MG: 1 INJECTION INTRAMUSCULAR; INTRAVENOUS at 14:05

## 2022-02-04 RX ADMIN — HYDROMORPHONE HYDROCHLORIDE 0.5 MG: 2 INJECTION INTRAMUSCULAR; INTRAVENOUS; SUBCUTANEOUS at 16:00

## 2022-02-04 RX ADMIN — SODIUM CHLORIDE: 9 INJECTION, SOLUTION INTRAVENOUS at 11:42

## 2022-02-04 RX ADMIN — SODIUM CHLORIDE, POTASSIUM CHLORIDE, SODIUM LACTATE AND CALCIUM CHLORIDE: 600; 310; 30; 20 INJECTION, SOLUTION INTRAVENOUS at 15:58

## 2022-02-04 RX ADMIN — HYDROMORPHONE HYDROCHLORIDE 0.5 MG: 2 INJECTION INTRAMUSCULAR; INTRAVENOUS; SUBCUTANEOUS at 16:26

## 2022-02-04 RX ADMIN — DEXAMETHASONE SODIUM PHOSPHATE 10 MG: 10 INJECTION, EMULSION INTRAMUSCULAR; INTRAVENOUS at 14:34

## 2022-02-04 RX ADMIN — FENTANYL CITRATE 100 MCG: 50 INJECTION, SOLUTION INTRAMUSCULAR; INTRAVENOUS at 14:10

## 2022-02-04 RX ADMIN — ONDANSETRON 4 MG: 2 INJECTION INTRAMUSCULAR; INTRAVENOUS at 16:07

## 2022-02-04 RX ADMIN — ROCURONIUM BROMIDE 40 MG: 50 INJECTION, SOLUTION INTRAVENOUS at 14:10

## 2022-02-04 ASSESSMENT — PULMONARY FUNCTION TESTS
PIF_VALUE: 20
PIF_VALUE: 21
PIF_VALUE: 19
PIF_VALUE: 20
PIF_VALUE: 19
PIF_VALUE: 20
PIF_VALUE: 19
PIF_VALUE: 20
PIF_VALUE: 19
PIF_VALUE: 20
PIF_VALUE: 18
PIF_VALUE: 20
PIF_VALUE: 21
PIF_VALUE: 19
PIF_VALUE: 20
PIF_VALUE: 19
PIF_VALUE: 19
PIF_VALUE: 1
PIF_VALUE: 20
PIF_VALUE: 15
PIF_VALUE: 2
PIF_VALUE: 20
PIF_VALUE: 19
PIF_VALUE: 23
PIF_VALUE: 19
PIF_VALUE: 20
PIF_VALUE: 20
PIF_VALUE: 3
PIF_VALUE: 19
PIF_VALUE: 2
PIF_VALUE: 20
PIF_VALUE: 20
PIF_VALUE: 0
PIF_VALUE: 20
PIF_VALUE: 15
PIF_VALUE: 20
PIF_VALUE: 0
PIF_VALUE: 20
PIF_VALUE: 1
PIF_VALUE: 15
PIF_VALUE: 20
PIF_VALUE: 19
PIF_VALUE: 20
PIF_VALUE: 20
PIF_VALUE: 19
PIF_VALUE: 20
PIF_VALUE: 19
PIF_VALUE: 20
PIF_VALUE: 20
PIF_VALUE: 19
PIF_VALUE: 19
PIF_VALUE: 22
PIF_VALUE: 20
PIF_VALUE: 3
PIF_VALUE: 20
PIF_VALUE: 22
PIF_VALUE: 15
PIF_VALUE: 15
PIF_VALUE: 19
PIF_VALUE: 20
PIF_VALUE: 5
PIF_VALUE: 22
PIF_VALUE: 15
PIF_VALUE: 17
PIF_VALUE: 4
PIF_VALUE: 20
PIF_VALUE: 20
PIF_VALUE: 1
PIF_VALUE: 20
PIF_VALUE: 4
PIF_VALUE: 20
PIF_VALUE: 4
PIF_VALUE: 19
PIF_VALUE: 20
PIF_VALUE: 0
PIF_VALUE: 20
PIF_VALUE: 2
PIF_VALUE: 19
PIF_VALUE: 20
PIF_VALUE: 19
PIF_VALUE: 15
PIF_VALUE: 20
PIF_VALUE: 20
PIF_VALUE: 19
PIF_VALUE: 15
PIF_VALUE: 20
PIF_VALUE: 1
PIF_VALUE: 3
PIF_VALUE: 20
PIF_VALUE: 0
PIF_VALUE: 20
PIF_VALUE: 21
PIF_VALUE: 19
PIF_VALUE: 20
PIF_VALUE: 20
PIF_VALUE: 1
PIF_VALUE: 20
PIF_VALUE: 7

## 2022-02-04 ASSESSMENT — PAIN SCALES - GENERAL
PAINLEVEL_OUTOF10: 2
PAINLEVEL_OUTOF10: 4
PAINLEVEL_OUTOF10: 5

## 2022-02-04 ASSESSMENT — PAIN DESCRIPTION - PAIN TYPE: TYPE: SURGICAL PAIN

## 2022-02-04 ASSESSMENT — PAIN DESCRIPTION - LOCATION: LOCATION: ABDOMEN

## 2022-02-04 NOTE — ANESTHESIA PRE PROCEDURE
Department of Anesthesiology  Preprocedure Note       Name:  Thea Jane   Age:  49 y.o.  :  1965                                          MRN:  [id number]         Date:  2022      Surgeon: Russ Olson):  Salazar Kiser MD    Procedure: Procedure(s):  IMAGE GUIDED SURGERY: RIGHT MAXILLARY ANTROSTOMY WITH REMOVAL OF TISSUE FROM MAXILLARY SINUS; EXCISION OF MAXILLARY POLYP OF SINUS    Medications prior to admission:   Prior to Admission medications    Medication Sig Start Date End Date Taking? Authorizing Provider   docusate sodium (COLACE) 100 MG capsule Take 100 mg by mouth 2 times daily    Historical Provider, MD   ferrous sulfate (FE TABS 325) 325 (65 Fe) MG EC tablet Take 1 tablet by mouth 2 times daily 22   Kristofer Shaffer DO   levothyroxine (SYNTHROID) 175 MCG tablet Take 200 mcg by mouth daily 200 mg mon thru sat 100 mcg on akanksha 11/6/15   Historical Provider, MD       Current medications:    No current facility-administered medications for this visit. No current outpatient medications on file. Facility-Administered Medications Ordered in Other Visits   Medication Dose Route Frequency Provider Last Rate Last Admin    0.9 % sodium chloride infusion   IntraVENous Continuous Salazar Kiser  mL/hr at 22 1142 New Bag at 22 1142       Allergies: Allergies   Allergen Reactions    Tape [place] Tape] Dermatitis     bandaids       Problem List:    Patient Active Problem List   Diagnosis Code    Post-surgical hypothyroidism E89.0    Papillary carcinoma of thyroid ([facility] C73    Witnessed apneic spells R06.81    Snoring R06.83    Obesity (BMI 30-39. 9) E66.9    Fatigue R53.83    Non-restorative sleep G47.8    Bruxism F45.8    Morning headache R51.9    Immune disorder (HCC) D89.9    Claustrophobia F40.240    Gastroesophageal reflux disease K21.9    Hypercholesteremia E78.00    Non morbid obesity due to excess calories E66.09    Fibroids D21.9    Maxillary polyp of sinus J33.8    Recurrent sinus infections J32.9    Nasal obstruction J34.89    Obstructive sleep apnea G47.33    Antrochoanal polyp J33.0       Past Medical History:        Diagnosis Date    BCC (basal cell carcinoma of skin)     Papillary carcinoma of thyroid ([address]     5/4/2015- Dr. Brandi Her    PONV (postoperative nausea and vomiting)        Past Surgical History:        Procedure Laterality Date    CHOLECYSTECTOMY      DILATION AND CURETTAGE OF UTERUS      EAR SURGERY Right 12/29/2017    MOHS REPAIR BCC OF RIGHT ALA and biopsy of left cheek x2 and right calf x1 performed by Nils Kasper MD at [address]  05/04/2015    [address] [facility] MOHS SURGERY Right 12/29/2017    MOHS Repair BCC of Right Ala     PRE-MALIGNANT / BENIGN SKIN LESION EXCISION  4/18/2014    forehead    SKIN BIOPSY  12/29/2017    Biopsy of Face x2 and Right calf x1    SUTURE REMOVAL Right 4/18/2014    Forearm, retained sutures    MARI AND BSO N/A 1/4/2022    HYSTERECTOMY ABDOMINAL TOTAL BILATERAL SALPINGO-OOPHORECTOMY performed by Kait Lyon DO at [address] Bilateral 05/04/2015    Dr. Brandi Her- total with modified right radical neck dissection    TONSILLECTOMY         Social History:    Social History     Tobacco Use    Smoking status: Former Smoker    Smokeless tobacco: Never Used   Substance Use Topics    Alcohol use: Yes     Alcohol/week: 0.0 standard drinks     Comment: weekly                                Counseling given: Not Answered      Vital Signs (Current): There were no vitals filed for this visit.                                            BP Readings from Last 3 Encounters:   02/04/22 (!) 142/74   01/25/22 (!) 140/82   01/06/22 133/77       NPO Status:                                                                                 BMI:   Wt Readings from Last 3 Encounters:   02/04/22 193 lb 12.8 oz (87.9 kg)   01/25/22 198 lb 3.2 oz (89.9 kg)   01/04/22 200 lb 9.6 oz (91 kg)     There is no height or weight on file to calculate BMI.    CBC:   Lab Results   Component Value Date    WBC 6.8 01/31/2022    RBC 4.61 01/31/2022    RBC 4.62 10/14/2021    HGB 11.3 01/31/2022    HCT 36.8 01/31/2022    MCV 79.8 01/31/2022    RDW 16.9 10/14/2021     01/31/2022       CMP:   Lab Results   Component Value Date     10/14/2021    K 4.1 10/14/2021     10/14/2021    CO2 25 10/14/2021    BUN 12 10/14/2021    CREATININE 0.61 10/14/2021    LABGLOM >90 12/22/2017    GLUCOSE 100 10/14/2021    PROT 6.3 10/14/2021    CALCIUM 8.0 10/14/2021    BILITOT 0.4 10/14/2021    ALKPHOS 45 10/14/2021    ALKPHOS 54 09/29/2017    AST 13 10/14/2021    ALT 10 10/14/2021       POC Tests: No results for input(s): POCGLU, POCNA, POCK, POCCL, POCBUN, POCHEMO, POCHCT in the last 72 hours. Coags: No results found for: PROTIME, INR, APTT    HCG (If Applicable):   Lab Results   Component Value Date    PREGTESTUR negative 01/04/2022        ABGs: No results found for: PHART, PO2ART, DHI2DKZ, RZH9ZTV, BEART, Y0NUSZDP     Type & Screen (If Applicable):  Lab Results   Component Value Date    LABRH POS 01/04/2022       Drug/Infectious Status (If Applicable):  No results found for: HIV, HEPCAB    COVID-19 Screening (If Applicable): No results found for: COVID19        Anesthesia Evaluation  Patient summary reviewed   history of anesthetic complications: PONV. Airway: Mallampati: II  TM distance: >3 FB   Neck ROM: full  Mouth opening: > = 3 FB Dental: normal exam         Pulmonary:normal exam                               Cardiovascular:          ECG reviewed                        Neuro/Psych:   (+) headaches:,             GI/Hepatic/Renal:   (+) GERD:,           Endo/Other:    (+) hypothyroidism::., .                 Abdominal:   (+) obese,           Vascular: Other Findings:               Anesthesia Plan      general     ASA 2       Induction: intravenous.     MIPS: Postoperative opioids intended and Prophylactic antiemetics administered. Anesthetic plan and risks discussed with patient and spouse.       Plan discussed with CRNA and surgical team.                  Lien Hayward MD   2/4/2022

## 2022-02-04 NOTE — FLOWSHEET NOTE
Pt admitted to [facility] room 14 and oriented to unit. Fall and allergy bands applied. SCD sleeves applied. Nares swabbed. Pt verbalized permission for first name, last initial and physicians name on white board. SDS board and discharge criteria explained, pt and family verbalized understanding. Pt denies thoughts of harming self or others. Call light in reach. Family ( Graylon ) at the bedside.

## 2022-02-04 NOTE — PROGRESS NOTES
Patient returned to SDS 14 from PACU, alert and oriented,  at bedside. Voices good understanding of discharge criteria.

## 2022-02-04 NOTE — H&P
Adapted from prior ENT note:    Isolated right antrochoanal polyp; right nasal obstruction  No new symptoms    Past Medical History:   Diagnosis Date    BCC (basal cell carcinoma of skin)     Papillary carcinoma of thyroid ([address]     5/4/2015- Dr. Sherri Perez PONV (postoperative nausea and vomiting)        Past Surgical History:   Procedure Laterality Date    CHOLECYSTECTOMY      DILATION AND CURETTAGE OF UTERUS      EAR SURGERY Right 12/29/2017    MOHS REPAIR BCC OF RIGHT ALA and biopsy of left cheek x2 and right calf x1 performed by Dwayne Moore MD at [address]  05/04/2015    [address] MOHS SURGERY Right 12/29/2017    MOHS Repair BCC of Right Ala     PRE-MALIGNANT / BENIGN SKIN LESION EXCISION  4/18/2014    forehead    SKIN BIOPSY  12/29/2017    Biopsy of Face x2 and Right calf x1    SUTURE REMOVAL Right 4/18/2014    Forearm, retained sutures    MARI AND BSO N/A 1/4/2022    HYSTERECTOMY ABDOMINAL TOTAL BILATERAL SALPINGO-OOPHORECTOMY performed by Pio Tovar DO at [address] Bilateral 05/04/2015    Dr. Marita Lindo- total with modified right radical neck dissection    TONSILLECTOMY         Allergies   Allergen Reactions    Tape James Gayer Tape] Dermatitis     bandaids       Current Facility-Administered Medications   Medication Dose Route Frequency Provider Last Rate Last Admin    0.9 % sodium chloride infusion   IntraVENous Continuous Mc Vicente  mL/hr at 02/04/22 1142 New Bag at 02/04/22 1142    meperidine (DEMEROL) injection 12.5 mg  12.5 mg IntraVENous Q5 Min PRN Stephany Palmer MD        fentaNYL (SUBLIMAZE) injection 25 mcg  25 mcg IntraVENous Q5 Min PRN Stephany Palmer MD        fentaNYL (SUBLIMAZE) injection 50 mcg  50 mcg IntraVENous Q5 Min PRN Stephany Palmer MD        HYDROmorphone (DILAUDID) injection 0.25 mg  0.25 mg IntraVENous Q5 Min PRN Stephany Palmer MD        HYDROmorphone (DILAUDID) injection 0.5 mg  0.5 mg IntraVENous Q5 Min PRN Sonido Goldman MD        promethazine [facility]) injection 12.5 mg  12.5 mg IntraMUSCular Once PRN Sonido Goldman MD        metoclopramide [facility]) injection 10 mg  10 mg IntraVENous Once PRN Sonido Goldman MD        diphenhydrAMINE (BENADRYL) injection 12.5 mg  12.5 mg IntraVENous Once PRN Sonido Goldman MD        labetalol (NORMODYNE;TRANDATE) injection syringe 5 mg  5 mg IntraVENous Q10 Min PRN Sonido Goldman MD           Current vitals  BP (!) 142/74   Pulse 84   Temp 99.9 °F (37.7 °C) (Temporal)   Resp 16   Ht 5' 4\" (1.626 m)   Wt 193 lb 12.8 oz (87.9 kg)   LMP 10/01/2021   SpO2 98%   BMI 33.27 kg/m²     Proceed with original surgical plan:  Right maxillary antrostomy with removal of antrochoanal polyp    Electronically signed by ZAMZAM Spencer CNP on 2/4/2022 at 2:08 PM      -----------------------------------------  -----------------------------------------      [address] [facility] [address], NOSE AND THROAT  Nancy Vazquez [address] [place] [address]  Dept: [phone]  Dept Fax: [phone]  Loc: [phone]     Andie Fair is a 49 y.o. female who was referred by No ref. provider found for:       Chief Complaint   Patient presents with    Follow-up       patient is here for f/u after CT facial bones wo contrast      HPI:      Andie Fair is a 49 y.o. female here for follow up regarding her sinuses. She has persistent right nasal obstruction. No discolored drainage. No allergic symptoms. +snoring. CT sinus 12/17/21 shows large right antrochoanal polyp. Initial HPI 11/30/2021:  Summer Parry a 49 y. o. female new patient here for evaluation of nasal and sinus issues.  Patient seen with myself in office 6/22/2018 for nasal obstruction and had a rush of yellow fluid from the right side of her nose/sinuses.  At the time of her visit, she was not symptomatic and plan was for nasal endoscopy on return visit; lost to follow up. Patient reports that she has not had any further discolored nasal drainage or rush of fluid as before.  She does have chronic sinus pressure in the right maxillary sinus that more recently feels like it has moved over the ethmoid area.  She continues to have difficulty breathing from her nose.  She notes dry mouth all of the time and has been mouth breathing more.  She does snore obstructively per her family's report.  She did see Dr. الع[facility]اقي [facility] in May 2015, notes reviewed; later had PSG on 12/29/2015 with AHI of 8.9. Unfortunately there were some issues with billing for these services and she did not return for follow-up appointment. Xavi Suarez returns in nasal saline irrigations without improvement, actually feels like it increases her sinus pressure.  She has been on multiple courses of antibiotics with Medrol Dosepak at temporarily improves her symptoms.               History of papillary thyroid carcinoma s/p total thyroidectomy, central neck dissection and right modified neck dissection 5/4/2015 with Dr Zeeshan Hayes. [place] follows with Dr Mag Shen (Endocrinology) at [facility] (further history regarding her thyroid disease in Care Everywhere, last visit note 11/16/2021).      History:            Allergies   Allergen Reactions    Tape [Adhesive Tape] Dermatitis       bandaids      Current Facility-Administered Medications          Current Outpatient Medications   Medication Sig Dispense Refill    ibuprofen (ADVIL;MOTRIN) 800 MG tablet Take 1 tablet by mouth every 8 hours as needed for Pain 60 tablet 0    ferrous sulfate (FE TABS 325) 325 (65 Fe) MG EC tablet Take 1 tablet by mouth 2 times daily 60 tablet 1    levothyroxine (SYNTHROID) 175 MCG tablet Take 200 mcg by mouth daily 200 mg mon thru sat 100 mcg on sunday          No current facility-administered medications for this visit.         Past Medical History        Past Medical History:   Diagnosis Date    [address] (basal cell carcinoma of skin)      Papillary carcinoma of thyroid (HCC)       5/4/2015- Dr. Donita Manrique    PONV (postoperative nausea and vomiting)           Past Surgical History         Past Surgical History:   Procedure Laterality Date    CHOLECYSTECTOMY        DILATION AND CURETTAGE OF UTERUS        EAR SURGERY Right 12/29/2017     MOHS REPAIR BCC OF RIGHT ALA and biopsy of left cheek x2 and right calf x1 performed by Eliseo Patel MD at [address]   05/04/2015     65    MOHS SURGERY        MOHS SURGERY Right 12/29/2017     MOHS Repair BCC of Right Ala     PRE-MALIGNANT / BENIGN SKIN LESION EXCISION   4/18/2014     forehead    SKIN BIOPSY   12/29/2017     Biopsy of Face x2 and Right calf x1    SUTURE REMOVAL Right 4/18/2014     Forearm, retained sutures    MARI AND BSO N/A 1/4/2022     HYSTERECTOMY ABDOMINAL TOTAL BILATERAL SALPINGO-OOPHORECTOMY performed by Aftab Horton DO at [address] Bilateral 05/04/2015     Dr. Donita Manrique- total with modified right radical neck dissection    TONSILLECTOMY             Family History         Family History   Problem Relation Age of Onset    Breast Cancer Paternal Grandmother           Social History            Tobacco Use    Smoking status: Former Smoker    Smokeless tobacco: Never Used   Substance Use Topics    Alcohol use: Yes       Alcohol/week: 0.0 standard drinks       Comment: weekly                    Subjective:      Review of Systems  Rest of review of systems are negative, except as noted in HPI.      Objective:      BP (!) 140/82 (Site: Left Upper Arm, Position: Sitting)   Pulse 82   Temp 98 °F (36.7 °C) (Infrared)   Resp 18   Ht 5' 4\" (1.626 m)   Wt 198 lb 3.2 oz (89.9 kg)   SpO2 98%   BMI 34.02 kg/m²      PHYSICAL EXAM  Constitutional: Oriented to person, place, and time. Appears stated aged. Appears well-developed and well-nourished. Voice and speech pattern appropriate for age and gender.  No distress. No stridor or audible wheezing. HENT:   Head: Normocephalic and atraumatic. Right Ear:  External ear normal.    Left Ear:  External ear normal.   Nose:  External nose normal. Nasal mucosa normal. No lesions noted. Mouth/Throat:  Good dentition. Mallampati III oral aperture. Oral cavity mucosa normal, no masses or lesions noted. Eyes:  Pupils are equal, round, and reactive to light. Conjunctivae and EOM are normal.   Neck:  Normal range of motion. Neck supple. No JVD present. No tracheal deviation present. No thyromegaly present. No cervical lymphadenopathy noted. Cardiovascular:  Normal rate and rhythm. Pulmonary/Chest:  Effort normal. No stridor or stertor. No respiratory distress. Lung sounds clear throughout. Musculoskeletal:  Normal range of motion. No edema or lymphadenopathy. Neurological:  Alert and oriented to person, place, and time. Cranial nerve II-XII grossly intact. Skin:  Skin is warm. No erythema. Psychiatric:  Normal mood and affect. Behavior is normal.      Vitals reviewed.     Data:  All of the past medical history, past surgical history, family history,social history, allergies and current medications were reviewed with the patient.                    Assessment & Plan   Diagnoses and all orders for this visit:       Diagnosis Orders   1. Maxillary polyp of sinus  CA NASAL SCOPY,RMV TISS MAXILL SINUS     CA NASAL SCOPE,BX/RMV POLYP/DEBRID   2. Nasal obstruction            The findings were explained and her questions were answered. Management of patient's care was collaborated with Dr Anastacia burns. Impression and plan as follows:     IMPRESSIONS:  1.  Right antrochoanal polyp; isolated   2.   Chronic right nasal obstruction           PLAN:  1.  To the operating room for maxillary antrostomy and removal of antrochoanal polyp  2.  Will attempt to completely marsupialize the polyp within the sinus itself so as to increase the chances that it will not recur.     **Will perform a rigid endoscopy following her procedure using tetracaine instead of topical lidocaine.                  Return for scheduled surgery.        **This report has been created using voice recognition software. It may contain minor errors which are inherent in voice recognition technology. **

## 2022-02-04 NOTE — ANESTHESIA POSTPROCEDURE EVALUATION
Department of Anesthesiology  Postprocedure Note    Patient: Heather Ochoa  MRN: [id number]  YOB: 1965  Date of evaluation: 2/4/2022  Time:  4:28 PM     Procedure Summary     Date: 02/04/22 Room / Location: [address]    Anesthesia Start: 1405 Anesthesia Stop: 1626    Procedure: IMAGE GUIDED SURGERY: RIGHT MAXILLARY ANTROSTOMY WITH REMOVAL OF TISSUE FROM MAXILLARY SINUS; EXCISION OF MAXILLARY POLYPS OF SINUS, ANTERIOR ETHMOID ECTOMY (N/A Nose) Diagnosis: (MAXILLARY POLYP OF SINUS)    Surgeons: Howard Antony MD Responsible Provider: Monie Johnson MD    Anesthesia Type: general ASA Status: 2          Anesthesia Type: general    Tre Phase I: Tre Score: 9    Tre Phase II:      Last vitals: Reviewed and per EMR flowsheets.        Anesthesia Post Evaluation    Patient location during evaluation: PACU  Patient participation: complete - patient participated  Level of consciousness: awake and alert  Pain score: 3  Airway patency: patent  Nausea & Vomiting: no nausea and no vomiting  Complications: no  Cardiovascular status: blood pressure returned to baseline and hemodynamically stable  Respiratory status: acceptable and room air  Hydration status: euvolemic

## 2022-02-04 NOTE — OP NOTE
Operative Note      Patient: Keesha Cruz  YOB: 1965  MRN: [id number]    Date of Procedure: 2/4/2022    Pre-Op Diagnosis: RIGHT MAXILLARY SINUS POLYPS    Post-Op Diagnosis: Same       Procedure(s):  IMAGE GUIDED SURGERY: RIGHT MAXILLARY ANTROSTOMY WITH REMOVAL OF TISSUE FROM MAXILLARY SINUS; EXCISION OF MAXILLARY POLYPS OF SINUS, ANTERIOR ETHMOID ECTOMY    Surgeon(s):  Radha Carr MD    Assistant:   * No surgical staff found *    Anesthesia: General    Estimated Blood Loss (mL): less than 50     Complications: None    Specimens:   ID Type Source Tests Collected by Time Destination   A : Right Middle Meatus Polyp Tissue Sinus SURGICAL PATHOLOGY Radha Carr MD 2/4/2022 1446    B : Right Unstunate  Tissue Sinus SURGICAL PATHOLOGY Radha Carr MD 2/4/2022 1449    C : MAXILLARY ANTROSTOMY Tissue Sinus SURGICAL PATHOLOGY Radha Carr MD 2/4/2022 1500    D : Right MIddle Turbinate  Tissue Sinus SURGICAL PATHOLOGY Radha Carr MD 2/4/2022 1527    E : Right Antrochoanal Polyp Tissue Sinus SURGICAL PATHOLOGY Radha Carr MD 2/4/2022 1604    F : Right Maxillary Antrostomy Tissue Sinus SURGICAL PATHOLOGY Radha Carr MD 2/4/2022 1608    G : Right Additional Maxiillary  Tissue Sinus SURGICAL PATHOLOGY Radha Carr MD 2/4/2022 1608        Implants:  * No implants in log *      Drains:   [REMOVED] Urethral Catheter (Removed)   $ Urethral catheter insertion Inserted for procedure 01/04/22 1150   Catheter Indications Perioperative use for selected surgical procedures 01/04/22 2005   Urine Color Yellow 01/04/22 2005   Urine Appearance Clear 01/04/22 2005   Output (mL) 120 mL 01/05/22 1010       Findings: 1. A very large right-sided maxillary antrochoanal polyp filling the maxillary sinus extending into the nasopharynx blocking both sides of the nasal airway.   2.  Multiple other maxillary sinus polyps underneath it; after the removal of the primary polyp I resected the remaining polyps until no visible polyp matter was in the maxillary sinus  3. Noted to do that I had to take down the majority of the medial maxillary wall down to the level of the inferior turbinate. Detailed Description of Procedure: The patient was taken to the operating room awake and placed in the supine position. General anesthesia was induced and the patient was intubated with a 7.0 endotracheal tube without difficulty by anesthesia. The table was turned 90 degrees. The patient was prepped and draped in usual fashion for aseptic endonasal surgery including the placement of a [employer] CT navigation system since her underneath her head. I performed a timeout verifying the patient's identity and planned procedure. Then calibrated the navigation system to acceptable parameters. Beginning with endoscopic evaluation using a 30 degree optical telescope, I could see the antrochoanal polyp coming out of the maxillary sinus and completely blocking the right nasal airway. I looked through the contralateral side and found the polyp extending across the midline blocking the majority of the left side as well. There also appeared to be another polyp in the middle meatus coming down in front of the middle turbinate. There were several smaller protrusions consistent with a more generalized nasal polyposis. After placing Afrin pledgets within the nasal vault I was able to shrink down the mucosa of the middle meatus and was able to see much more clearly the extent of the patient's disease. I medialized the middle turbinate with a [place] elevator and then used a grasping forcep to remove a representative polyp from the middle meatus handed off the field be placed in formalin for permanent section. I then turned my attention to opening the maxillary sinus in preparation for the delivery of this huge antrochoanal polyp.   I used a [place] elevator to incise the uncinate process and Blakesley forceps to lifted off of the lateral wall and cut off of its superior root. I handed this tissue off to be placed in formalin for permanent section. I then used the localization system to determine the level of the orbital bone. I marked that spot with a sinus seeker and then used a seeker to incise the lateral wall into the maxillary sinus with multiple penetration points and a curved line and inferiorly and posteriorly. And then placed a [place] elevator between these penetration points and then probably removed a large portion of the medial wall of the maxillary sinus handing it off the field to be placed in formalin for permanent section. I used suction cautery for hemostasis. Anterior ethmoidectomy: The bulla ethmoidalis and anterior ethmoid sinus cells were definitely blocking access to the medial maxillary wall as well as the posterior middle meatus making it difficult to deliver the polyp from the sinus in any complete manner. I therefore opted to resect the cells to remove them from the obstruction and to also eliminate any other polypoid disease that they may contain. Using the navigation and a 40 degree power debrider Tricut blade, I entered the bulla ethmoidalis and removed the majority of the anterior ethmoid air cells below the level of the orbit in that process. Once able to see into the maxillary sinus I could see the very large antral polyp and its choke point in the natural sinus ostium posterior to this entry point. I reflected the posterior aspect of the medial maxillary wall medially with a combination of instruments including Blakesley's and a [place] elevator removing portions of it as I continued posteriorly. Ultimately I was able to fully expose the exit point of the maxillary sinus polyp. Still found the posterior root of the polyp difficult to visualize and hard to deliver because of the posterior root of the middle turbinate blocking access.   I therefore used a bayonet insulated bipolar cautery device to bipolar cauterized the posterior root of the middle turbinate and a cut point anteriorly and at its lateral attachment posteriorly. I then used right going turbinate scissors to incise these 2 cauterized points and Blakesley forceps to remove this segment of tissues in the process. I handed this off to be placed in formalin for permanent section. Once it was removed remove the remainder of the soft tissue elements that were overlying the polyps root and then grasped the polyp with Blakesley forceps from the choana delivering it anteriorly. Then took the same forceps and grabbed the polyp from within the sinus and delivered it completely out of the nasal vault handed off the field replaced in formalin for permanent section. It measured more than 6 cm in length. Exchanging telescopes for a 70 degree angled scope, I could see into the maxillary sinus and saw at least 3 more relatively large polyps at the base of the anterior sinus that would likely grow and eventually block of the sinus passage. I used curve giraffe forceps to remove the largest of of these polyps and handed those off to be placed in formalin for permanent section as additional polyps. And then I used 120 degree Tricut blade debrider blade to push my way down into the base of the sinus and remove the roots of these polyps ~no further polypoid matter could be seen whatsoever. I achieved hemostasis with topical Afrin and suction cautery. After removal of these polyps materials I used suction cautery to achieve hemostasis in the resected areas and then irrigated out both nasal vaults with copious amounts of sterile saline and a bulb syringe until the rinsed matter was largely free of gross blood. I suctioned out the oropharynx and turned the patient back to anesthesia for reversal extubation in the operating room.   This was carried out without incident and the patient was taken to recovery in satisfactory

## 2022-02-04 NOTE — BRIEF OP NOTE
Brief Postoperative Note      Patient: Lupe Desai  YOB: 1965  MRN: [id number]    Date of Procedure: 2/4/2022    Pre-Op Diagnosis: MAXILLARY POLYPS OF RIGHT SINUS    Post-Op Diagnosis: Same       Procedure(s):  IMAGE GUIDED SURGERY: RIGHT MAXILLARY ANTROSTOMY WITH REMOVAL OF TISSUE FROM MAXILLARY SINUS; EXCISION OF MAXILLARY POLYPS OF SINUS, ANTERIOR ETHMOID ECTOMY    Surgeon(s):  Delfino Jhaveri MD    Assistant:  * No surgical staff found *    Anesthesia: General    Estimated Blood Loss (mL): less than 50     Complications: None    Specimens:   ID Type Source Tests Collected by Time Destination   A : Right Middle Meatus Polyp Tissue Sinus SURGICAL PATHOLOGY Delfino Jhaveri MD 2/4/2022 1446    B : Right Unstunate  Tissue Sinus SURGICAL PATHOLOGY Delfino Jhaveri MD 2/4/2022 1449    C : MAXILLARY ANTROSTOMY Tissue Sinus SURGICAL PATHOLOGY Delfino Jhaveri MD 2/4/2022 1500    D : Right MIddle Turbinate  Tissue Sinus SURGICAL PATHOLOGY Delfino Jhaveri MD 2/4/2022 1527    E : Right Antrochoanal Polyp Tissue Sinus SURGICAL PATHOLOGY Delfino Jhaveri MD 2/4/2022 1604    F : Right Maxillary Antrostomy Tissue Sinus SURGICAL PATHOLOGY Delfino Jhaveri MD 2/4/2022 1608    G : Right Additional Maxiillary  Tissue Sinus SURGICAL PATHOLOGY Delfino Jhaveri MD 2/4/2022 1608        Implants:  * No implants in log *      Drains:   [REMOVED] Urethral Catheter (Removed)   $ Urethral catheter insertion Inserted for procedure 01/04/22 1150   Catheter Indications Perioperative use for selected surgical procedures 01/04/22 2005   Urine Color Yellow 01/04/22 2005   Urine Appearance Clear 01/04/22 2005   Output (mL) 120 mL 01/05/22 1010       Findings: 1. A very large right-sided maxillary antrochoanal polyp filling the maxillary sinus extending into the nasopharynx blocking both sides of the nasal airway.   2.  Multiple other maxillary sinus polyps underneath it; after the removal of the primary polyp I resected the remaining polyps until no visible polyp matter was in the maxillary sinus  3. Noted to do that I had to take down the majority of the medial maxillary wall down to the level of the inferior turbinate.       Electronically signed by Kj Seo MD on 2/4/2022 at 4:30 PM

## 2022-02-05 NOTE — ADDENDUM NOTE
Addendum  created 02/05/22 0009 by Sarah Valdez recorded in [facility], [address] [facility] accepted, Intraprocedure Attestations filed

## 2022-02-08 ENCOUNTER — INITIAL CONSULT (OUTPATIENT)
Dept: PULMONOLOGY | Age: 57
End: 2022-02-08
Payer: COMMERCIAL

## 2022-02-08 VITALS
HEART RATE: 76 BPM | TEMPERATURE: 97.3 F | WEIGHT: 199 LBS | HEIGHT: 64 IN | DIASTOLIC BLOOD PRESSURE: 82 MMHG | SYSTOLIC BLOOD PRESSURE: 130 MMHG | BODY MASS INDEX: 33.97 KG/M2 | OXYGEN SATURATION: 99 %

## 2022-02-08 DIAGNOSIS — E66.9 OBESITY (BMI 30-39.9): ICD-10-CM

## 2022-02-08 DIAGNOSIS — G47.33 OSA (OBSTRUCTIVE SLEEP APNEA): Primary | ICD-10-CM

## 2022-02-08 DIAGNOSIS — J33.9 NASAL POLYPS: ICD-10-CM

## 2022-02-08 PROCEDURE — 99203 OFFICE O/P NEW LOW 30 MIN: CPT | Performed by: INTERNAL MEDICINE

## 2022-02-08 NOTE — PROGRESS NOTES
New Sleep Patient H/P    Presentation:  Leyla Cabrera is referred by Lissette Medeiros for  {SLEEP JNB:381801255}    Time in Bed:   Bedtime: {PROC CLOCK POSITION:}{AM/PM:4966222880}   Awakens  {{PROC CLOCK POSITION:} {AM/PM:8718961971}   Different on weekends? {YES / WM:82549}  How?***     Leyla Cabrera falls asleep in {Numbers; 10,15,25,40,60:24567}  minutes. Any awakenings? {YES / GK:53663}  Difficulty Falling back to sleep? {YES / T}  {Symptoms began:  {numbers; 0-10:41483} {time units:11} ago. Symptoms include: {Sleep apnea short:8900706581}    Previous evaluation and treatment? {YES / VJ:59705}  Where? Which ones? {sleep tx:66321}    She denies any history of sleep walking or sleep talking. No history of seizures activity. No history suggestive of restless legs syndrome. No history of bruxism. No history of head injury. Naps:  Any naps? {YES / BU:} and are they helpful {YES / NO:97177}    Snoring and Apneas:  Do you snore or been told you a snore? {YES / ZH:02791}  How long have known about your snoring? {days/wks/mos/yrs:973626}  Any witnessed apneas? {YES / GN:10004}  Any awakenings with choking or gasping? {YES / ER:16371}    Dreams:  Any recurring dreams? {YES / RV:14618}  Hallucinations? {YES / FF:51890}  Sleep Paralysis? {YES / YW:26987}  Symptoms of Cataplexy? {YES / XI:64022}    Driving History:  Do you have a CDL or drive long distances for work? {YES / PD:18161}  Any driving accidents in the past year? {YES / VJ:46568}  Any sleepiness while driving? {YES / LESLIE:11360}    Weight:  Any change in weight over the past year? {YES / UT:50959}   How about past 5 years? {YES / VM:62338}  How much? {NUMBERS BY 5 TO 40:19387}    Other Compliants :Leyla Cabrera complains of {LAM complaints:07992} as well.     Past Medical History:   Diagnosis Date    BCC (basal cell carcinoma of skin)     Papillary carcinoma of thyroid ([facility] [address]     2015- Dr. Rhett ESPINAL (postoperative nausea and vomiting)        Past Surgical History:   Procedure Laterality Date    CHOLECYSTECTOMY      DILATION AND CURETTAGE OF UTERUS      EAR SURGERY Right 12/29/2017    MOHS REPAIR BCC OF RIGHT ALA and biopsy of left cheek x2 and right calf x1 performed by Vale Nicole MD at [address]  05/04/2015    [address] MOHS SURGERY Right 12/29/2017    MOHS Repair BCC of Right Ala     PRE-MALIGNANT / BENIGN SKIN LESION EXCISION  4/18/2014    forehead    SINUS ENDOSCOPY N/A 2/4/2022    IMAGE GUIDED SURGERY: RIGHT MAXILLARY ANTROSTOMY WITH REMOVAL OF TISSUE FROM MAXILLARY SINUS; EXCISION OF MAXILLARY POLYPS OF SINUS, ANTERIOR ETHMOID ECTOMY performed by Lara Pena MD at [address]  12/29/2017    Biopsy of Face x2 and Right calf x1    SUTURE REMOVAL Right 4/18/2014    Forearm, retained sutures    MARI AND BSO N/A 1/4/2022    HYSTERECTOMY ABDOMINAL TOTAL BILATERAL SALPINGO-OOPHORECTOMY performed by Maddie Khan DO at [address] Bilateral 05/04/2015    Dr. Alli Rodríguez- total with modified right radical neck dissection    TONSILLECTOMY         Social History     Tobacco Use    Smoking status: Former Smoker    Smokeless tobacco: Never Used   Vaping Use    Vaping Use: Never used   Substance Use Topics    Alcohol use: Yes     Alcohol/week: 0.0 standard drinks     Comment: weekly    Drug use: No       Allergies   Allergen Reactions    Tape [Adhesive Tape] Dermatitis     bandaids       Current Outpatient Medications   Medication Sig Dispense Refill    docusate sodium (COLACE) 100 MG capsule Take 100 mg by mouth 2 times daily      HYDROcodone-acetaminophen (NORCO) 5-325 MG per tablet Take 1 tablet by mouth every 4 hours as needed for Pain for up to 5 days. Intended supply: 5 days.  Take lowest dose possible to manage pain 30 tablet 0    ciprofloxacin (CIPRO) 500 MG tablet Take 1 tablet by mouth 2 times daily for 10 days 20 tablet 0    ferrous sulfate (FE TABS 325) 325 (65 Fe) MG EC tablet Take 1 tablet by mouth 2 times daily 60 tablet 1    levothyroxine (SYNTHROID) 175 MCG tablet Take 200 mcg by mouth daily 200 mg mon thru sat 100 mcg on sunday       No current facility-administered medications for this visit. Family History   Problem Relation Age of Onset    Breast Cancer Paternal Grandmother         Any family history of any sleep problems or any one in your family on CPAP? {YES / JU:17103}    Social History     Tobacco Use    Smoking status: Former Smoker    Smokeless tobacco: Never Used   Vaping Use    Vaping Use: Never used   Substance Use Topics    Alcohol use: Yes     Alcohol/week: 0.0 standard drinks     Comment: weekly    Drug use: No     Caffeine Intake: How much soda (pop), coffee, tea, power drinks do you ingest per day? {NUMBERS 0-10:33893} per day. Employment History:  Where do you work? ***  What are your shifts? ***    Any recent changes in shifts or hours? ***    Review of Systems:   General/Constitutional: No recent loss of weight or appetite changes. No fever or chills. HENT: Negative. Eyes: Negative. Upper respiratory tract: No nasal stuffiness or post nasal drip. Lower respiratory tract/ lungs: No cough or sputum production. No hemoptysis. Cardiovascular: No palpitations or chest pain. Gastrointestinal: No nausea or vomiting. Neurological: No focal neurologiacal weakness. Extremities: No edema. Musculoskeletal: No complaints. Genitourinary: No complaints. Hematological: Negative. Psychiatric/Behavioral: Negative. Skin: No itching. Physical Exam:    {LINWOOD ROLFZW:905554259} {PALUMBO GAAUOE:950567115}    BMI:  There is no height or weight on file to calculate BMI. Neck Size: 15 Oxygen Sat: {Exam; oxygen delivery:47213}    ESS: 7  SAQLI: 91  Vitals: LMP 10/01/2021       Mallampati Score: 4    Physical Exam :  Constitutional: Moderately built and moderately nourished. No distress.   HENT:   Head: Normocephalic and atraumatic. Mouth/Throat: Oropharynx is clear and moist. No oral thrush. Mallampati   . Large tongue. Retrognathic. Eyes: Conjunctivae are normal. PERRLA. No scleral icterus. Neck: Neck supple. No JVD present. No tracheal deviation present. Cardiovascular: Normal rate, regular rhythm, normal heart sounds. No murmur heard. Pulmonary/Chest: Effort normal and breath sounds normal. No stridor. No respiratory distress. No wheezes. No rales. Abdominal: Soft. No distension. No tenderness. Musculoskeletal: Normal range of motion. Lymphadenopathy:  No cervical adenopathy. Neurological: Alert and oriented to person, place, and time. No focal deficits. Skin: Skin is warm and dry. Patient is not diaphoretic. Psychiatric: Normal behavior with normal mood and affect. Diagnostic Data:    Assessment   {Sleep apnea diagnosis:50150}  ***    Plan   {Sleep plan :02685}  ***  Mask Desensitization and Pre study teaching? {YES / EQ:03114}  Weight Loss Information Given? {YES / UY:20884}  Sleep Hygiene Discussed? {YES / LS:35565}    -She was advised to call [employer] regarding supplies if needed.  -She call my office for earlier appointment if needed for worsening of sleep symptoms. -[facility] [address] educated about my impression and plan. Patient verbalizes understanding.

## 2022-02-08 NOTE — PROGRESS NOTES
[place] [facility] Pul[facility], Sleep and [address] [facility] initial consultation note    Reginaldo Patino                                                Chief complaint: Reginaldo Patino is a 49 y. o.oldfemale came for further evaluation regarding her ?sleep apnea  with referral from [address] Bliss, ZAMZAM-CNP. Was sent here by ENT to coordinate possible treatment of sleep apnea. Had a 4.5 cm polyp in the R sinus cavity and nose that was removed this Friday (). She has no history of heart or lung conditions. [place]:    Sleep/Wake schedule:  Usual time to go to bed during the work/regular day of week: 11:00 PM.  Usual time to wake up during the work//regular day of week: 5:30 AM.  Over the weekends her sleep schedule: [] Remain same. [x]phase delayed. (Usually sleeps in more on the weekends)  She usually falls a sleep in less than: 10 minutes. She takes naps: No.    Sleep Hygiene:    Is the temperature and evironment in her bed room is acceptable to her: Yes. She watches Television in her bed room: Yes. \"Tends to leave it on\"  She read books, study, pay bills etc in the bed: Yes. Frequency She wake up during night/sleep: 1-2 times a night  Majority of nocturnal awakenings are for urination: No.    Difficulty in falling back to sleep after nocturnal awakenings: Sometimes  . Do you drink coffee: Yes. 5 cup/s per day. Do you drink caffeinated beverages i.e sodas: No. 0 can/s per day. Do you drink tea:Yes. 1 cup/s/glasse/s per day (not every day)  Do you drink alcoholic beverages: Very infrequent. Used to drink 1-2 glasses of wine/day  History of recreational drug use: No.     History of tobacco smoking:Yes. Amount of tobacco smokin.0 PPD. Years of tobacco smokin. Quit smoking: Yes. Quit year:   Current smoker: No.           Sleep apnea symptoms:  Noticed to have loud snoring: Used to have loud snoring. Post nasal surgery, has not heard any snoring. Witnessed apneas during sleep noticed: No. Previously witnessed in 2015 by medical personnel during thyroid removal.  History of choking and gasping sensation at night time: No.  History of headaches in the morning:No.  History of dry mouth in the morning: Yes  History of palpitations during night time/nocturnal awakenings: No.  History of sweating during night time/nocturnal awakenings: No    General:  History of head injury in the past: No.  []  Due to MVA  [] Not due to MVA. History of seizures: No.   Restless legs syndrome symptoms:NO  History suggestive of periodic limb movements during sleep: Not noticed by family. Was noted on the sleep study in 2015. History suggestive of hypnagogic hallucinations: NO  History suggestive of hypnopompic hallucinations: NO  History suggestive of sleep talking:NO  History suggestive of sleep walking:NO  History suggestive of bruxism: No. [] No mouth guard. [] Uses mouth guard. History suggestive of cataplexy: NO  History suggestive of sleep paralysis: NO    Family history of sleep disorders:  Family history of obstructive sleep apnea: No. Father suspected to have. Family history of Narcolepsy: No.  Family history of Rest less legs syndrome : No.    History regarding old sleep studies:  Prior history of sleep study: Yes (2015). Using CPAP device: No.  Currently using home Oxygen: NO.    Patient considerations:  Is the patient is ambulatory: Yes  Patient is currently using: None of these Wheelchair, Dickson Curio or [employer]. Para/Quadriplegic: NO  Hearing deficit : NO  Claustrophobic: NO  MDD : NO  Blind: NO  Incontinent: NO  Para/Quadraplegi: NO.   Need transportation to and from Sleep Center:NO      Social History:  Social History     Tobacco Use    Smoking status: Former Smoker    Smokeless tobacco: Never Used   Vaping Use    Vaping Use: Never used   Substance Use Topics    Alcohol use:  Yes     Alcohol/week: 0.0 standard days 20 tablet 0    ferrous sulfate (FE TABS 325) 325 (65 Fe) MG EC tablet Take 1 tablet by mouth 2 times daily 60 tablet 1    levothyroxine (SYNTHROID) 175 MCG tablet Take 200 mcg by mouth daily 200 mg mon thru sat 100 mcg on sunday       No current facility-administered medications for this visit. Family History   Problem Relation Age of Onset    Breast Cancer Paternal Grandmother         Review of Systems:   General/Constitutional: Denies fever, chills, significant weight change, fatigue. HENT: Negative. Eyes: Denies vision disturbance. Upper respiratory tract: Denies congestion, sore throat. Lower respiratory tract/ lungs: Denies coughing, wheezing. Cardiovascular: Denies chest pain, SOB. Gastrointestinal: Denies nausea, vomiting, diarrhea  Neurological: Denies headache, dizziness  Extremities: Denies leg swelling  Musculoskeletal: Denies weakness. Genitourinary: Negative. Hematological: Negative. Psychiatric/Behavioral: Negative. Skin: Negative. LMP 10/01/2021    Mallampati Score: 4   Neck Circumference:15.0 Inches. Her [place] sleepiness score given today was : 7  SAQLI: 91    Physical Exam   Nursing note and vitals reviewed. Constitutional:   HENT: Mild congestion and swelling in nostrils. Head: Normocephalic, atraumatic  Right Ear: No abnormalities noted. Left Ear: No abnormalities noted. Mouth/Throat: Clear, dry mucous membranes  Eyes: EOM intact  Neck: Supple, no JVD  Cardiovascular: Regular rate and rhythm, S1 and S2  Pulmonary/Chest: No effort or respiratory distress. Clear to auscultation bilaterally. Abdominal: Soft, nontender, nondistended. Musculoskeletal: No abnormalities noted. Extremities: No clubbing, cyanosis, or edema noted. Lymphadenopathy:  No abnormalities noted. Neurological: No focal neurologic deficits. Skin: No abnormalities noted. Psychiatric: Thought content and mood appropriate.     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 Suspect as contributing factor to patient's previous sleep apnea symptoms.  - No current CPAP machine usage  - No Hx of CV or lung disorders    Recommendations/Plan:  - Discussed elective sleep study with the patient and her . She has elected not to perform sleep study at the time and wait until symptoms return or worsen before doing so. - Recommended weight loss, sleeping on the side, and decongestants. - F/U as needed for returning/worsening symptoms. The patient was seen and discussed with Dr. Rafal Holt.     Electronically signed by Diony Meng DO. PGY-1.

## 2022-02-21 ENCOUNTER — OFFICE VISIT (OUTPATIENT)
Dept: ENT CLINIC | Age: 57
End: 2022-02-21
Payer: COMMERCIAL

## 2022-02-21 VITALS
SYSTOLIC BLOOD PRESSURE: 122 MMHG | HEIGHT: 64 IN | BODY MASS INDEX: 33.44 KG/M2 | HEART RATE: 85 BPM | TEMPERATURE: 97.3 F | RESPIRATION RATE: 16 BRPM | OXYGEN SATURATION: 99 % | WEIGHT: 195.9 LBS | DIASTOLIC BLOOD PRESSURE: 82 MMHG

## 2022-02-21 DIAGNOSIS — J34.89 NASAL OBSTRUCTION: ICD-10-CM

## 2022-02-21 DIAGNOSIS — J33.0 ANTROCHOANAL POLYP: Primary | ICD-10-CM

## 2022-02-21 DIAGNOSIS — J33.8 MAXILLARY POLYP OF SINUS: ICD-10-CM

## 2022-02-21 DIAGNOSIS — J32.2 CHRONIC ETHMOIDAL SINUSITIS: ICD-10-CM

## 2022-02-21 PROCEDURE — 99214 OFFICE O/P EST MOD 30 MIN: CPT | Performed by: OTOLARYNGOLOGY

## 2022-02-21 PROCEDURE — 31237 NSL/SINS NDSC SURG BX POLYPC: CPT | Performed by: OTOLARYNGOLOGY

## 2022-02-21 RX ORDER — LEVOTHYROXINE SODIUM 0.15 MG/1
TABLET ORAL
COMMUNITY
Start: 2022-02-19 | End: 2022-02-21

## 2022-02-21 NOTE — PROGRESS NOTES
[facility] [place] [facility]  [facility] EAR, NOSE AND THROAT  [address] [address]  Dept: [phone]  Dept Fax: [phone]  Loc: [phone]    Lupe Desai is a 49 y.o. female who was referred by No ref. provider found for:  Chief Complaint   Patient presents with    Post-Op Check     patient is here for post op maxillary antrostomy, polyp removal 2/4/22, R/S  from 2/14 due to recovery from hysterecomy       HPI:     Lupe Desai is a 49 y.o. female status post the removal of a very large antrochoanal polyp along with multiple other polyps of the maxillary sinus and middle meatus. This included a takedown of the posterior middle turbinate. The patient reports having done well with improved nasal breathing through her right side compared to no airflow previously. Some of this is been very uncomfortable for her because of the severe cold temperatures that we have had in the recent past.  She also describes a very consistent \"teapot sign\" of tipping forward or forward and to the left with a large outflow of mucoid fluid from the right nostril. Given how open her maxillary sinus was made, in all likelihood this is drainage of the maxillary sinus. History: Allergies   Allergen Reactions    Tape Jacrakelyn Ott Tape] Dermatitis     bandaids     Current Outpatient Medications   Medication Sig Dispense Refill    docusate sodium (COLACE) 100 MG capsule Take 100 mg by mouth 2 times daily      ferrous sulfate (FE TABS 325) 325 (65 Fe) MG EC tablet Take 1 tablet by mouth 2 times daily 60 tablet 1    levothyroxine (SYNTHROID) 175 MCG tablet Take 200 mcg by mouth daily 200 mg mon thru sat 100 mcg on sunday       No current facility-administered medications for this visit.      Past Medical History:   Diagnosis Date    BCC (basal cell carcinoma of skin)     Papillary carcinoma of thyroid ([facility] [address]     5/4/2015- Dr. Bueno Needs    PONV (postoperative nausea and vomiting)       Past Surgical History:   Procedure Laterality Date    CHOLECYSTECTOMY      DILATION AND CURETTAGE OF UTERUS      EAR SURGERY Right 12/29/2017    MOHS REPAIR BCC OF RIGHT ALA and biopsy of left cheek x2 and right calf x1 performed by Jean Paul Lomax MD at [address]  05/04/2015    [address] MOHS SURGERY Right 12/29/2017    MOHS Repair BCC of Right Ala     PRE-MALIGNANT / BENIGN SKIN LESION EXCISION  4/18/2014    forehead    SINUS ENDOSCOPY N/A 2/4/2022    IMAGE GUIDED SURGERY: RIGHT MAXILLARY ANTROSTOMY WITH REMOVAL OF TISSUE FROM MAXILLARY SINUS; EXCISION OF MAXILLARY POLYPS OF SINUS, ANTERIOR ETHMOID ECTOMY performed by Jennifer Betancourt MD at [address]  12/29/2017    Biopsy of Face x2 and Right calf x1    SUTURE REMOVAL Right 4/18/2014    Forearm, retained sutures    MARI AND BSO N/A 1/4/2022    HYSTERECTOMY ABDOMINAL TOTAL BILATERAL SALPINGO-OOPHORECTOMY performed by Bhaskar Evans DO at [address] Bilateral 05/04/2015    Dr. Mariella Schwartz- total with modified right radical neck dissection    TONSILLECTOMY       Family History   Problem Relation Age of Onset    Breast Cancer Paternal Grandmother      Social History     Tobacco Use    Smoking status: Former Smoker    Smokeless tobacco: Never Used   Substance Use Topics    Alcohol use: Yes     Alcohol/week: 0.0 standard drinks     Comment: weekly        Subjective:      Review of Systems  Rest of review of systems are negative, except as noted in HPI. Objective:     /82 (Site: Left Upper Arm, Position: Sitting)   Pulse 85   Temp 97.3 °F (36.3 °C) (Infrared)   Resp 16   Ht 5' 4\" (1.626 m)   Wt 195 lb 14.4 oz (88.9 kg)   LMP 10/01/2021   SpO2 99%   BMI 33.63 kg/m²     Physical Exam         Patient is a well-appearing mildly hyponasal adult female in no acute distress.       Procedure: Nasal endoscopy with debridement  Findings: After the use of topical tetracaine given the patient's history of not tolerating lidocaine and having no anesthesia from it, I introduced the nasal endoscope to remove a very large volume of soft tissue debris mucus and dried blood in the nasal vault. After removing the majority of it I found that patient had a very dense nasal synechia between the inferior turbinate and the septum blocking her airway to a significant degree. She also had a lateral synechiae formation between the middle turbinate and the lateral wall with purulence seen in the middle meatus going up into the anterior ethmoid air cells. These have the look of a chronic process. However the patient's anesthesia was minimal and she started having more more discomfort with instrumentation she was undergoing. As such I stopped deferring to proceed until I can get her under anesthesia to complete this process. This is what the patient understandably desired. Vitals reviewed. No results found.    Lab Results   Component Value Date     10/14/2021     12/22/2017     09/29/2017    K 4.1 10/14/2021    K 4.1 12/22/2017    K 4.4 09/29/2017     10/14/2021     12/22/2017     09/29/2017    CO2 25 10/14/2021    CO2 26 12/22/2017    CO2 23 09/29/2017    BUN 12 10/14/2021    BUN 13 12/22/2017    BUN 19 09/29/2017    CREATININE 0.61 10/14/2021    CREATININE 0.6 12/22/2017    CREATININE 0.6 09/29/2017    CALCIUM 8.0 10/14/2021    CALCIUM 8.3 12/22/2017    CALCIUM 8.0 09/29/2017    PROT 6.3 10/14/2021    PROT 6.6 09/29/2017    PROT 6.7 09/29/2016    LABALBU 3.9 10/14/2021    LABALBU 4.2 09/29/2017    LABALBU 4.1 09/29/2016    BILITOT 0.4 10/14/2021    BILITOT 0.2 09/29/2017    BILITOT 0.3 09/29/2016    ALKPHOS 45 10/14/2021    ALKPHOS 54 09/29/2017    ALKPHOS 48 09/29/2016    AST 13 10/14/2021    AST 10 09/29/2017    AST 13 09/29/2016    ALT 10 10/14/2021    ALT 8 09/29/2017    ALT 11 09/29/2016       All of the past medical history, past surgical history, family history,social history, allergies and current medications were reviewed with the patient. Assessment & Plan   Diagnoses and all orders for this visit:     Diagnosis Orders   1. Antrochoanal polyp  HI NASAL SCOPE,BX/RMV POLYP/DEBRID    Miscellaneous Surgery   2. Maxillary polyp of sinus  HI NASAL SCOPE,BX/RMV POLYP/DEBRID    Miscellaneous Surgery   3. Nasal obstruction  HI NASAL SCOPE,BX/RMV POLYP/DEBRID    Miscellaneous Surgery    CT FACIAL BONES WO CONTRAST   4. Chronic ethmoidal sinusitis  Miscellaneous Surgery    CT FACIAL BONES WO CONTRAST       Based on the patient's history and these physical findings, the patient has had some untoward healing following this operation and needs more extensive treatment that she can tolerate awake. I reviewed with her and with her , who was with us the entire time, the basis of my recommendations for general anesthesia based procedure. She agreed wholeheartedly given how uncomfortable the procedure was despite the use of tetracaine. In all likelihood I do not proceed with an opening of the middle meatus as well as the takedown of this inferior turbinates  synechia she will likely develop chronic sinusitis in the anterior ethmoid air cells since acute sinusitis has already occurred. I will get a fresh IGS CT scan to enable navigation for the paranasal sinus work. .    I spent over 30 minutes of face-to-face time with the patient the majority of which was relegated to reviewing her complex history and planning this treatment program.      Return in about 3 weeks (around 3/14/2022) for Nasal endoscopy and cleansing. **This report has been created using voice recognition software. It may contain minor errors which are inherent in voice recognition technology. **        Addendum March 28, 2022    Patient called describing a resurgence of nasal bleeding and mucopurulent discharge from her right nasal airway.   She is still on her steroid taper but finishes in 1 day. She is also describing increased pain on the right side. I agreed with her this is possibly a sign of a recurrence of her bacterial infection and warrants starting antibiotic therapy. I recommended she also preemptively treat herself for the potential for a yeast infection having just come off of high-dose steroids. She agreed. I called and ciprofloxacin 750 twice daily for 10 days. She will contact me and let me know how she is doing in the near future. Gopal Fields.  Naif Alvarado MD

## 2022-03-01 ENCOUNTER — HOSPITAL ENCOUNTER (OUTPATIENT)
Dept: CT IMAGING | Age: 57
Discharge: HOME | End: 2022-03-01
Payer: COMMERCIAL

## 2022-03-01 DIAGNOSIS — J34.89 NASAL OBSTRUCTION: ICD-10-CM

## 2022-03-01 DIAGNOSIS — J32.2 CHRONIC ETHMOIDAL SINUSITIS: ICD-10-CM

## 2022-03-01 PROCEDURE — 70486 CT MAXILLOFACIAL W/O DYE: CPT

## 2022-03-03 ENCOUNTER — PREP FOR PROCEDURE (OUTPATIENT)
Dept: ENT CLINIC | Age: 57
End: 2022-03-03

## 2022-03-03 RX ORDER — SODIUM CHLORIDE 9 MG/ML
25 INJECTION, SOLUTION INTRAVENOUS PRN
Status: CANCELLED | OUTPATIENT
Start: 2022-03-03

## 2022-03-03 RX ORDER — SODIUM CHLORIDE 0.9 % (FLUSH) 0.9 %
5-40 SYRINGE (ML) INJECTION PRN
Status: CANCELLED | OUTPATIENT
Start: 2022-03-03

## 2022-03-03 RX ORDER — SODIUM CHLORIDE 0.9 % (FLUSH) 0.9 %
5-40 SYRINGE (ML) INJECTION EVERY 12 HOURS SCHEDULED
Status: CANCELLED | OUTPATIENT
Start: 2022-03-03

## 2022-03-04 NOTE — PROGRESS NOTES
PAT call attempted, patient unavailable, left message to please call us back at your earliest convenience; [phone]

## 2022-03-11 ENCOUNTER — ANESTHESIA (OUTPATIENT)
Dept: OPERATING ROOM | Age: 57
End: 2022-03-11
Payer: COMMERCIAL

## 2022-03-11 ENCOUNTER — ANESTHESIA EVENT (OUTPATIENT)
Dept: OPERATING ROOM | Age: 57
End: 2022-03-11
Payer: COMMERCIAL

## 2022-03-11 ENCOUNTER — HOSPITAL ENCOUNTER (OUTPATIENT)
Age: 57
Setting detail: OUTPATIENT SURGERY
Discharge: HOME | End: 2022-03-11
Attending: OTOLARYNGOLOGY | Admitting: OTOLARYNGOLOGY
Payer: COMMERCIAL

## 2022-03-11 VITALS
DIASTOLIC BLOOD PRESSURE: 90 MMHG | TEMPERATURE: 98.4 F | SYSTOLIC BLOOD PRESSURE: 139 MMHG | OXYGEN SATURATION: 97 % | HEART RATE: 107 BPM | RESPIRATION RATE: 18 BRPM | HEIGHT: 64 IN | WEIGHT: 192.4 LBS | BODY MASS INDEX: 32.85 KG/M2

## 2022-03-11 VITALS — DIASTOLIC BLOOD PRESSURE: 65 MMHG | SYSTOLIC BLOOD PRESSURE: 132 MMHG | OXYGEN SATURATION: 100 %

## 2022-03-11 DIAGNOSIS — J32.2 CHRONIC ETHMOIDAL SINUSITIS: Primary | ICD-10-CM

## 2022-03-11 DIAGNOSIS — J34.89 NASAL OBSTRUCTION: ICD-10-CM

## 2022-03-11 LAB
ALBUMIN SERPL-MCNC: 3.9 G/DL (ref 3.5–5.1)
ALP BLD-CCNC: 58 U/L (ref 38–126)
ALT SERPL-CCNC: 10 U/L (ref 11–66)
ANION GAP SERPL CALCULATED.3IONS-SCNC: 11 MEQ/L (ref 8–16)
AST SERPL-CCNC: 13 U/L (ref 5–40)
BASOPHILS # BLD: 0.7 %
BASOPHILS ABSOLUTE: 0.1 THOU/MM3 (ref 0–0.1)
BILIRUB SERPL-MCNC: 0.2 MG/DL (ref 0.3–1.2)
BUN BLDV-MCNC: 14 MG/DL (ref 7–22)
CALCIUM SERPL-MCNC: 8 MG/DL (ref 8.5–10.5)
CHLORIDE BLD-SCNC: 108 MEQ/L (ref 98–111)
CO2: 21 MEQ/L (ref 23–33)
CREAT SERPL-MCNC: 0.5 MG/DL (ref 0.4–1.2)
EOSINOPHIL # BLD: 0.8 %
EOSINOPHILS ABSOLUTE: 0.1 THOU/MM3 (ref 0–0.4)
ERYTHROCYTE [DISTWIDTH] IN BLOOD BY AUTOMATED COUNT: 18.4 % (ref 11.5–14.5)
ERYTHROCYTE [DISTWIDTH] IN BLOOD BY AUTOMATED COUNT: 53.1 FL (ref 35–45)
GFR SERPL CREATININE-BSD FRML MDRD: > 90 ML/MIN/1.73M2
GLUCOSE BLD-MCNC: 107 MG/DL (ref 70–108)
HCT VFR BLD CALC: 36.3 % (ref 37–47)
HEMOGLOBIN: 11.2 GM/DL (ref 12–16)
IMMATURE GRANS (ABS): 0.05 THOU/MM3 (ref 0–0.07)
IMMATURE GRANULOCYTES: 0.7 %
INR BLD: 1.05 (ref 0.85–1.13)
LYMPHOCYTES # BLD: 14.4 %
LYMPHOCYTES ABSOLUTE: 1.1 THOU/MM3 (ref 1–4.8)
MCH RBC QN AUTO: 24.6 PG (ref 26–33)
MCHC RBC AUTO-ENTMCNC: 30.9 GM/DL (ref 32.2–35.5)
MCV RBC AUTO: 79.8 FL (ref 81–99)
MONOCYTES # BLD: 2.6 %
MONOCYTES ABSOLUTE: 0.2 THOU/MM3 (ref 0.4–1.3)
NUCLEATED RED BLOOD CELLS: 0 /100 WBC
PLATELET # BLD: 268 THOU/MM3 (ref 130–400)
PMV BLD AUTO: 9.8 FL (ref 9.4–12.4)
POTASSIUM SERPL-SCNC: 4 MEQ/L (ref 3.5–5.2)
RBC # BLD: 4.55 MILL/MM3 (ref 4.2–5.4)
SEG NEUTROPHILS: 80.8 %
SEGMENTED NEUTROPHILS ABSOLUTE COUNT: 6.1 THOU/MM3 (ref 1.8–7.7)
SODIUM BLD-SCNC: 140 MEQ/L (ref 135–145)
TOTAL PROTEIN: 6.4 G/DL (ref 6.1–8)
WBC # BLD: 7.5 THOU/MM3 (ref 4.8–10.8)

## 2022-03-11 PROCEDURE — 2580000003 HC RX 258: Performed by: OTOLARYNGOLOGY

## 2022-03-11 PROCEDURE — 2709999900 HC NON-CHARGEABLE SUPPLY: Performed by: OTOLARYNGOLOGY

## 2022-03-11 PROCEDURE — 36415 COLL VENOUS BLD VENIPUNCTURE: CPT

## 2022-03-11 PROCEDURE — 2720000010 HC SURG SUPPLY STERILE: Performed by: OTOLARYNGOLOGY

## 2022-03-11 PROCEDURE — 31032 EXPLORE SINUS REMOVE POLYPS: CPT | Performed by: OTOLARYNGOLOGY

## 2022-03-11 PROCEDURE — 7100000000 HC PACU RECOVERY - FIRST 15 MIN: Performed by: OTOLARYNGOLOGY

## 2022-03-11 PROCEDURE — 3700000000 HC ANESTHESIA ATTENDED CARE: Performed by: OTOLARYNGOLOGY

## 2022-03-11 PROCEDURE — 31254 NSL/SINS NDSC W/PRTL ETHMDCT: CPT | Performed by: OTOLARYNGOLOGY

## 2022-03-11 PROCEDURE — 3600000004 HC SURGERY LEVEL 4 BASE: Performed by: OTOLARYNGOLOGY

## 2022-03-11 PROCEDURE — 6360000002 HC RX W HCPCS: Performed by: OTOLARYNGOLOGY

## 2022-03-11 PROCEDURE — 80053 COMPREHEN METABOLIC PANEL: CPT

## 2022-03-11 PROCEDURE — 7100000001 HC PACU RECOVERY - ADDTL 15 MIN: Performed by: OTOLARYNGOLOGY

## 2022-03-11 PROCEDURE — 2500000003 HC RX 250 WO HCPCS: Performed by: REGISTERED NURSE

## 2022-03-11 PROCEDURE — 31276 NSL/SINS NDSC FRNT TISS RMVL: CPT | Performed by: OTOLARYNGOLOGY

## 2022-03-11 PROCEDURE — 3700000001 HC ADD 15 MINUTES (ANESTHESIA): Performed by: OTOLARYNGOLOGY

## 2022-03-11 PROCEDURE — 6370000000 HC RX 637 (ALT 250 FOR IP): Performed by: OTOLARYNGOLOGY

## 2022-03-11 PROCEDURE — 88305 TISSUE EXAM BY PATHOLOGIST: CPT

## 2022-03-11 PROCEDURE — C2625 STENT, NON-COR, TEM W/DEL SY: HCPCS | Performed by: OTOLARYNGOLOGY

## 2022-03-11 PROCEDURE — 6360000002 HC RX W HCPCS: Performed by: REGISTERED NURSE

## 2022-03-11 PROCEDURE — 7100000010 HC PHASE II RECOVERY - FIRST 15 MIN: Performed by: OTOLARYNGOLOGY

## 2022-03-11 PROCEDURE — 85025 COMPLETE CBC W/AUTO DIFF WBC: CPT

## 2022-03-11 PROCEDURE — 3600000014 HC SURGERY LEVEL 4 ADDTL 15MIN: Performed by: OTOLARYNGOLOGY

## 2022-03-11 PROCEDURE — 85610 PROTHROMBIN TIME: CPT

## 2022-03-11 PROCEDURE — APPNB30 APP NON BILLABLE TIME 0-30 MINS: Performed by: PHYSICIAN ASSISTANT

## 2022-03-11 PROCEDURE — 7100000011 HC PHASE II RECOVERY - ADDTL 15 MIN: Performed by: OTOLARYNGOLOGY

## 2022-03-11 PROCEDURE — 6370000000 HC RX 637 (ALT 250 FOR IP): Performed by: ANESTHESIOLOGY

## 2022-03-11 PROCEDURE — 6370000000 HC RX 637 (ALT 250 FOR IP): Performed by: REGISTERED NURSE

## 2022-03-11 DEVICE — PROPEL SINUS IMPLANT
Type: IMPLANTABLE DEVICE | Status: FUNCTIONAL
Brand: PROPEL

## 2022-03-11 RX ORDER — AMOXICILLIN AND CLAVULANATE POTASSIUM 875; 125 MG/1; MG/1
1 TABLET, FILM COATED ORAL 2 TIMES DAILY
Qty: 20 TABLET | Refills: 0 | Status: SHIPPED | OUTPATIENT
Start: 2022-03-11 | End: 2022-03-21

## 2022-03-11 RX ORDER — MINERAL OIL AND WHITE PETROLATUM 150; 830 MG/G; MG/G
OINTMENT OPHTHALMIC PRN
Status: DISCONTINUED | OUTPATIENT
Start: 2022-03-11 | End: 2022-03-11 | Stop reason: SDUPTHER

## 2022-03-11 RX ORDER — FENTANYL CITRATE 50 UG/ML
INJECTION, SOLUTION INTRAMUSCULAR; INTRAVENOUS PRN
Status: DISCONTINUED | OUTPATIENT
Start: 2022-03-11 | End: 2022-03-11 | Stop reason: SDUPTHER

## 2022-03-11 RX ORDER — ROCURONIUM BROMIDE 10 MG/ML
INJECTION, SOLUTION INTRAVENOUS PRN
Status: DISCONTINUED | OUTPATIENT
Start: 2022-03-11 | End: 2022-03-11 | Stop reason: SDUPTHER

## 2022-03-11 RX ORDER — SODIUM CHLORIDE 9 MG/ML
INJECTION, SOLUTION INTRAVENOUS CONTINUOUS
Status: DISCONTINUED | OUTPATIENT
Start: 2022-03-11 | End: 2022-03-11 | Stop reason: HOSPADM

## 2022-03-11 RX ORDER — CEFAZOLIN SODIUM 1 G/3ML
INJECTION, POWDER, FOR SOLUTION INTRAMUSCULAR; INTRAVENOUS PRN
Status: DISCONTINUED | OUTPATIENT
Start: 2022-03-11 | End: 2022-03-11 | Stop reason: SDUPTHER

## 2022-03-11 RX ORDER — SCOLOPAMINE TRANSDERMAL SYSTEM 1 MG/1
PATCH, EXTENDED RELEASE TRANSDERMAL PRN
Status: DISCONTINUED | OUTPATIENT
Start: 2022-03-11 | End: 2022-03-11 | Stop reason: SDUPTHER

## 2022-03-11 RX ORDER — GLYCOPYRROLATE 1 MG/5 ML
SYRINGE (ML) INTRAVENOUS PRN
Status: DISCONTINUED | OUTPATIENT
Start: 2022-03-11 | End: 2022-03-11 | Stop reason: SDUPTHER

## 2022-03-11 RX ORDER — SCOLOPAMINE TRANSDERMAL SYSTEM 1 MG/1
PATCH, EXTENDED RELEASE TRANSDERMAL
Status: COMPLETED
Start: 2022-03-11 | End: 2022-03-11

## 2022-03-11 RX ORDER — PROPOFOL 10 MG/ML
INJECTION, EMULSION INTRAVENOUS PRN
Status: DISCONTINUED | OUTPATIENT
Start: 2022-03-11 | End: 2022-03-11 | Stop reason: SDUPTHER

## 2022-03-11 RX ORDER — OXYCODONE AND ACETAMINOPHEN 7.5; 325 MG/1; MG/1
1 TABLET ORAL ONCE
Status: COMPLETED | OUTPATIENT
Start: 2022-03-11 | End: 2022-03-11

## 2022-03-11 RX ORDER — DEXAMETHASONE SODIUM PHOSPHATE 10 MG/ML
INJECTION, EMULSION INTRAMUSCULAR; INTRAVENOUS PRN
Status: DISCONTINUED | OUTPATIENT
Start: 2022-03-11 | End: 2022-03-11 | Stop reason: SDUPTHER

## 2022-03-11 RX ORDER — MIDAZOLAM HYDROCHLORIDE 1 MG/ML
INJECTION INTRAMUSCULAR; INTRAVENOUS PRN
Status: DISCONTINUED | OUTPATIENT
Start: 2022-03-11 | End: 2022-03-11 | Stop reason: SDUPTHER

## 2022-03-11 RX ORDER — DROPERIDOL 2.5 MG/ML
INJECTION, SOLUTION INTRAMUSCULAR; INTRAVENOUS PRN
Status: DISCONTINUED | OUTPATIENT
Start: 2022-03-11 | End: 2022-03-11 | Stop reason: SDUPTHER

## 2022-03-11 RX ORDER — OXYMETAZOLINE HYDROCHLORIDE 0.05 G/100ML
SPRAY NASAL PRN
Status: DISCONTINUED | OUTPATIENT
Start: 2022-03-11 | End: 2022-03-11 | Stop reason: ALTCHOICE

## 2022-03-11 RX ORDER — NEOSTIGMINE METHYLSULFATE 5 MG/5 ML
SYRINGE (ML) INTRAVENOUS PRN
Status: DISCONTINUED | OUTPATIENT
Start: 2022-03-11 | End: 2022-03-11 | Stop reason: SDUPTHER

## 2022-03-11 RX ORDER — OXYCODONE HYDROCHLORIDE AND ACETAMINOPHEN 5; 325 MG/1; MG/1
1 TABLET ORAL EVERY 6 HOURS PRN
Qty: 20 TABLET | Refills: 0 | Status: SHIPPED | OUTPATIENT
Start: 2022-03-11 | End: 2022-03-16

## 2022-03-11 RX ADMIN — OXYCODONE HYDROCHLORIDE AND ACETAMINOPHEN 1 TABLET: 7.5; 325 TABLET ORAL at 18:39

## 2022-03-11 RX ADMIN — SODIUM CHLORIDE: 9 INJECTION, SOLUTION INTRAVENOUS at 13:58

## 2022-03-11 RX ADMIN — SCOPALAMINE 1 PATCH: 1 PATCH, EXTENDED RELEASE TRANSDERMAL at 14:20

## 2022-03-11 RX ADMIN — DROPERIDOL 0.62 MG: 2.5 INJECTION, SOLUTION INTRAMUSCULAR; INTRAVENOUS at 15:34

## 2022-03-11 RX ADMIN — Medication 3 MG: at 16:39

## 2022-03-11 RX ADMIN — MINERAL OIL AND WHITE PETROLATUM 1 INCH: 150; 830 OINTMENT OPHTHALMIC at 15:34

## 2022-03-11 RX ADMIN — Medication 60 MG: at 15:30

## 2022-03-11 RX ADMIN — MIDAZOLAM 2 MG: 1 INJECTION INTRAMUSCULAR; INTRAVENOUS at 15:25

## 2022-03-11 RX ADMIN — Medication 0.4 MG: at 16:39

## 2022-03-11 RX ADMIN — PROPOFOL 150 MG: 10 INJECTION, EMULSION INTRAVENOUS at 15:30

## 2022-03-11 RX ADMIN — DEXAMETHASONE SODIUM PHOSPHATE 10 MG: 10 INJECTION, EMULSION INTRAMUSCULAR; INTRAVENOUS at 15:34

## 2022-03-11 RX ADMIN — FENTANYL CITRATE 50 MCG: 50 INJECTION, SOLUTION INTRAMUSCULAR; INTRAVENOUS at 16:13

## 2022-03-11 RX ADMIN — ROCURONIUM BROMIDE 50 MG: 50 INJECTION, SOLUTION INTRAVENOUS at 15:30

## 2022-03-11 RX ADMIN — FENTANYL CITRATE 50 MCG: 50 INJECTION, SOLUTION INTRAMUSCULAR; INTRAVENOUS at 16:57

## 2022-03-11 RX ADMIN — SODIUM CHLORIDE: 9 INJECTION, SOLUTION INTRAVENOUS at 15:57

## 2022-03-11 RX ADMIN — CEFAZOLIN 2000 MG: 1 INJECTION, POWDER, FOR SOLUTION INTRAMUSCULAR; INTRAVENOUS at 15:45

## 2022-03-11 ASSESSMENT — PULMONARY FUNCTION TESTS
PIF_VALUE: 19
PIF_VALUE: 20
PIF_VALUE: 19
PIF_VALUE: 18
PIF_VALUE: 19
PIF_VALUE: 16
PIF_VALUE: 19
PIF_VALUE: 19
PIF_VALUE: 1
PIF_VALUE: 19
PIF_VALUE: 19
PIF_VALUE: 20
PIF_VALUE: 21
PIF_VALUE: 19
PIF_VALUE: 20
PIF_VALUE: 19
PIF_VALUE: 18
PIF_VALUE: 19
PIF_VALUE: 16
PIF_VALUE: 19
PIF_VALUE: 3
PIF_VALUE: 11
PIF_VALUE: 19
PIF_VALUE: 14
PIF_VALUE: 2
PIF_VALUE: 19
PIF_VALUE: 14
PIF_VALUE: 19
PIF_VALUE: 11
PIF_VALUE: 20
PIF_VALUE: 19
PIF_VALUE: 21
PIF_VALUE: 16
PIF_VALUE: 20
PIF_VALUE: 19
PIF_VALUE: 21
PIF_VALUE: 19
PIF_VALUE: 19
PIF_VALUE: 14
PIF_VALUE: 19
PIF_VALUE: 3
PIF_VALUE: 19
PIF_VALUE: 14
PIF_VALUE: 18
PIF_VALUE: 20
PIF_VALUE: 18
PIF_VALUE: 17
PIF_VALUE: 19
PIF_VALUE: 2
PIF_VALUE: 19
PIF_VALUE: 1
PIF_VALUE: 22
PIF_VALUE: 19
PIF_VALUE: 0
PIF_VALUE: 19
PIF_VALUE: 18
PIF_VALUE: 18
PIF_VALUE: 17
PIF_VALUE: 19
PIF_VALUE: 19
PIF_VALUE: 20
PIF_VALUE: 17
PIF_VALUE: 1

## 2022-03-11 ASSESSMENT — PAIN DESCRIPTION - PAIN TYPE: TYPE: SURGICAL PAIN

## 2022-03-11 ASSESSMENT — PAIN DESCRIPTION - LOCATION: LOCATION: FACE

## 2022-03-11 ASSESSMENT — PAIN - FUNCTIONAL ASSESSMENT: PAIN_FUNCTIONAL_ASSESSMENT: 0-10

## 2022-03-11 ASSESSMENT — PAIN SCALES - GENERAL
PAINLEVEL_OUTOF10: 8
PAINLEVEL_OUTOF10: 7
PAINLEVEL_OUTOF10: 0
PAINLEVEL_OUTOF10: 5
PAINLEVEL_OUTOF10: 9

## 2022-03-11 ASSESSMENT — PAIN DESCRIPTION - DESCRIPTORS: DESCRIPTORS: NAGGING;ACHING

## 2022-03-11 ASSESSMENT — PAIN DESCRIPTION - ORIENTATION: ORIENTATION: UPPER

## 2022-03-11 NOTE — PROGRESS NOTES
1659 Awake and oriented on arrival to PACU , pt c/o # 6 - 7 face and H/A , medicated with Fentanyl 50 mcg IV  1715 eyes closed resp easy   1730 awakens to name , states face and nose hurts but drifts back to sleep before rating the pain   1735 Meets criteria for discharge , transported to [facility]

## 2022-03-11 NOTE — BRIEF OP NOTE
Brief Postoperative Note      Patient: Esequiel Reyes  YOB: 1965  MRN: [id number]    Date of Procedure: 3/11/2022    Pre-Op Diagnosis: ANTROCHOANAL POLYP, MAXIL. HIMANSHU POLYP, NASAL OBSTRUCTION, CHRONIC SINUSITIS; frontal sinusitis    Post-Op Diagnosis: Same; nasopharyngeal obstruction       Procedure(s):  NASAL ENDOSCOPY WITH DEBRIDEMENT (TAKEDOWN OF MULTIPLE NASAL SYNCHIA AND RESECTION OF NASAL POLYPOSIS, LIMITED ANTERIOR ETHMOIDECTOMY USING IGS, SPLIT PLACEMENT; nasopharyngeal biopsies  Right frontal recess exploration    Surgeon(s):  Mary Mora MD    Assistant:  * No surgical staff found *    Anesthesia: General    Estimated Blood Loss (mL): 397     Complications: None    Specimens:   ID Type Source Tests Collected by Time Destination   A : Nasal Pharangeal Tissue  Tissue Nasopharynx/Oropharynx SURGICAL PATHOLOGY Mary Mora MD 3/11/2022 1614        Implants:  Implant Name Type Inv. Item Serial No.  Lot No. LRB No. Used Action   IMPLANT NSL L23MM MOMETASONE FUROATE PROPEL - [id number]  IMPLANT NSL L23MM MOMETASONE FUROATE PROPEL  INTERSECT ENT-WD [id number] Right 1 Implanted         Drains: * No LDAs found *    Findings: 1. Extensive mucositis with friable mucosa throughout the nasal vault including in the unoperated left side  2. Nasopharyngeal obstruction with hypertrophic soft tissue in the nasopharynx; biopsied  3. 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   Septal synechia lysed from inferior turbinate; highly friable soft tissues    Electronically signed by Mary Mora MD on 3/11/2022 at 5:17 PM

## 2022-03-11 NOTE — ANESTHESIA PRE PROCEDURE
Department of Anesthesiology  Preprocedure Note       Name:  Mc Chapman   Age:  49 y.o.  :  1965                                          MRN:  [id number]         Date:  3/11/2022      Surgeon: Ok Floor):  Dinesh Wilson MD    Procedure: Procedure(s):  NASAL ENDOSCOPY WITH DEBRIDEMENT (TAKEDOWN OF MULTIPLE NASAL SYNCHIA AND RESECTION OF NASAL POLYPOSIS, LIMITED ANTERIOR ETHMOIDECTOMY USING IGS, SPLIT PLACEMENT    Medications prior to admission:   Prior to Admission medications    Medication Sig Start Date End Date Taking? Authorizing Provider   levothyroxine (SYNTHROID) 175 MCG tablet Take 200 mcg by mouth daily 200 mg mon thru sat 100 mcg on akanksha 11/6/15  Yes Historical Provider, MD       Current medications:    Current Facility-Administered Medications   Medication Dose Route Frequency Provider Last Rate Last Admin    0.9 % sodium chloride infusion   IntraVENous Continuous Dinesh Wilson  mL/hr at 22 1358 New Bag at 22 1358    scopolamine (TRANSDERM-SCOP) 1 MG/3DAYS transdermal patch                Allergies: Allergies   Allergen Reactions    Tape Carranza Shires Tape] Dermatitis     bandaids       Problem List:    Patient Active Problem List   Diagnosis Code    Post-surgical hypothyroidism E89.0    Papillary carcinoma of thyroid ([facility] [address] C73    Witnessed apneic spells R06.81    Snoring R06.83    Obesity (BMI 30-39. 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 David Shrestha    PONV (postoperative nausea and vomiting)        Past Surgical History:        Procedure Laterality Date    CHOLECYSTECTOMY      DILATION AND CURETTAGE OF UTERUS      EAR SURGERY Right 12/29/2017    MOHS REPAIR BCC OF RIGHT ALA and biopsy of left cheek x2 and right calf x1 performed by Andrade Hyman MD at [place] [address]  05/04/2015    [facility] Right 12/29/2017    MOHS Repair BCC of Right Ala     PRE-MALIGNANT / BENIGN SKIN LESION EXCISION  4/18/2014    forehead    SINUS ENDOSCOPY N/A 2/4/2022    IMAGE GUIDED SURGERY: RIGHT MAXILLARY ANTROSTOMY WITH REMOVAL OF TISSUE FROM MAXILLARY SINUS; EXCISION OF MAXILLARY POLYPS OF SINUS, ANTERIOR ETHMOID ECTOMY performed by Lili Cavazos MD at [address]  12/29/2017    Biopsy of Face x2 and Right calf x1    SUTURE REMOVAL Right 4/18/2014    Forearm, retained sutures    MARI AND BSO N/A 1/4/2022    HYSTERECTOMY ABDOMINAL TOTAL BILATERAL SALPINGO-OOPHORECTOMY performed by Lavelle Forrest DO at [address] Bilateral 05/04/2015    Dr. David Shrestha- total with modified right radical neck dissection    TONSILLECTOMY         Social History:    Social History     Tobacco Use    Smoking status: Former Smoker    Smokeless tobacco: Never Used   Substance Use Topics    Alcohol use:  Yes     Alcohol/week: 0.0 standard drinks     Comment: occasionally                                 Counseling given: Not Answered      Vital Signs (Current):   Vitals:    03/11/22 1301   BP: (!) 142/80   Pulse: 81   Resp: 18   Temp: 98.1 °F (36.7 °C)   TempSrc: Temporal   SpO2: 99%   Weight: 192 lb 6.4 oz (87.3 kg)   Height: 5' 4\" (1.626 m)                                              BP Readings from Last 3 Encounters:   03/11/22 (!) 142/80   02/21/22 122/82   02/08/22 130/82       NPO Status: Time of last liquid consumption: 1900                        Time of last solid consumption: 2200                        Date of last liquid consumption: 03/10/22                        Date of last solid food consumption: 03/10/22    BMI:   Wt Readings from Last 3 Encounters:   03/11/22 192 lb 6.4 oz (87.3 kg)   02/21/22 195 lb 14.4 oz (88.9 kg)   02/08/22 199 lb (90.3 kg)     Body mass index is 33.03 kg/m². CBC:   Lab Results   Component Value Date    WBC 6.8 01/31/2022    RBC 4.61 01/31/2022    RBC 4.62 10/14/2021    HGB 11.3 01/31/2022    HCT 36.8 01/31/2022    MCV 79.8 01/31/2022    RDW 16.9 10/14/2021     01/31/2022       CMP:   Lab Results   Component Value Date     10/14/2021    K 4.1 10/14/2021     10/14/2021    CO2 25 10/14/2021    BUN 12 10/14/2021    CREATININE 0.61 10/14/2021    LABGLOM >90 12/22/2017    GLUCOSE 100 10/14/2021    PROT 6.3 10/14/2021    CALCIUM 8.0 10/14/2021    BILITOT 0.4 10/14/2021    ALKPHOS 45 10/14/2021    ALKPHOS 54 09/29/2017    AST 13 10/14/2021    ALT 10 10/14/2021       POC Tests: No results for input(s): POCGLU, POCNA, POCK, POCCL, POCBUN, POCHEMO, POCHCT in the last 72 hours.     Coags: No results found for: PROTIME, INR, APTT    HCG (If Applicable):   Lab Results   Component Value Date    PREGTESTUR negative 01/04/2022        ABGs: No results found for: PHART, PO2ART, DSC0QIG, BTE2EWJ, BEART, V3UOPAMF     Type & Screen (If Applicable):  Lab Results   Component Value Date    LABRH POS 01/04/2022       Drug/Infectious Status (If Applicable):  No results found for: HIV, HEPCAB    COVID-19 Screening (If Applicable): No results found for: COVID19        Anesthesia Evaluation  Patient summary reviewed and Nursing notes reviewed history of anesthetic complications:   Airway: Mallampati: II        Dental:          Pulmonary: breath sounds clear to auscultation  (+) sleep apnea:                             Cardiovascular:  Exercise tolerance: good (>4 METS), ECG reviewed  Rhythm: regular  Rate: normal                    Neuro/Psych:   (+) headaches:,             GI/Hepatic/Renal:             Endo/Other:    (+) hypothyroidism::., .                 Abdominal:       Abdomen: soft. Vascular: Other Findings:             Anesthesia Plan      general     ASA 2       Induction: intravenous. MIPS: Postoperative opioids intended and Prophylactic antiemetics administered. Anesthetic plan and risks discussed with patient and spouse.                       [facility],    3/11/2022

## 2022-03-11 NOTE — H&P
Interval note: Patient presents for surgery today. She is scheduled for nasal endoscopy with debridement, take down of multiple nasal synchia and resection of nasal polyposis, limited anterior ethmoidectomy using IGS and splint placement with Dr Kamron Huynh. She states that she has been doing pretty well since she was last seen. She reports persistent nasal congestion and facial pressure, which is relatively stable in severity. She denies any chest pain, shortness of breath, fevers, chills. She states she is otherwise feeling healthy today. On exam, the patient is resting comfortably in bed in [facility] upon my arrival. She displays no evidence of respiratory distress and is breathing comfortably. Lung sounds laminar bilaterally without wheezing, stridor, or rhonchi. Heart rate and rhythm WNL. Nasal cavity is restricted and congested. Oral cavity is moist without obvious oral lesions. No palpable neck masses or edema is noted. Remainder of exam unchanged. Discussed the procedure with the patient. She expresses understanding and would like to proceed. [facility] [place] [facility]  [facility] EAR, NOSE AND THROAT  [address] [place] [id number]  Dept: [phone]  Dept Fax: [phone]  Loc: [phone]    Low Sommers is a 49 y.o. female who was referred by No ref. provider found for:  No chief complaint on file. HPI:     Low Sommers is a 49 y.o. female status post the removal of a very large antrochoanal polyp along with multiple other polyps of the maxillary sinus and middle meatus. This included a takedown of the posterior middle turbinate. The patient reports having done well with improved nasal breathing through her right side compared to no airflow previously.   Some of this is been very uncomfortable for her because of the severe cold temperatures that we have had in the recent past.  She also describes a very consistent \"teapot sign\" of tipping forward or forward and to the left with a large outflow of mucoid fluid from the right nostril. Given how open her maxillary sinus was made, in all likelihood this is drainage of the maxillary sinus. History:      Allergies   Allergen Reactions    Tape Dougie Lalit Tape] Dermatitis     bandaids     Current Facility-Administered Medications   Medication Dose Route Frequency Provider Last Rate Last Admin    0.9 % sodium chloride infusion   IntraVENous Continuous Ronal Cardoso  mL/hr at 03/11/22 1358 New Bag at 03/11/22 1358     Past Medical History:   Diagnosis Date    BCC (basal cell carcinoma of skin)     Papillary carcinoma of thyroid ([facility] [address]     5/4/2015- Dr. Bean Pagan    PONV (postoperative nausea and vomiting)       Past Surgical History:   Procedure Laterality Date    CHOLECYSTECTOMY      DILATION AND CURETTAGE OF UTERUS      EAR SURGERY Right 12/29/2017    MOHS REPAIR BCC OF RIGHT ALA and biopsy of left cheek x2 and right calf x1 performed by Kulwant Butts MD at [place] [address]  05/04/2015    [facility] Right 12/29/2017    MOHS Repair BCC of Right Ala     PRE-MALIGNANT / BENIGN SKIN LESION EXCISION  4/18/2014    forehead    SINUS ENDOSCOPY N/A 2/4/2022    IMAGE GUIDED SURGERY: RIGHT MAXILLARY ANTROSTOMY WITH REMOVAL OF TISSUE FROM MAXILLARY SINUS; EXCISION OF MAXILLARY POLYPS OF SINUS, ANTERIOR ETHMOID ECTOMY performed by Ronal Cardoso MD at [address]  12/29/2017    Biopsy of Face x2 and Right calf x1    SUTURE REMOVAL Right 4/18/2014    Forearm, retained sutures    MARI AND BSO N/A 1/4/2022    HYSTERECTOMY ABDOMINAL TOTAL BILATERAL SALPINGO-OOPHORECTOMY performed by Noemi Garay DO at [address] Bilateral 05/04/2015    Dr. Bean Pagan- total with modified right radical neck dissection    TONSILLECTOMY       Family History   Problem Relation Age of Onset    Breast Cancer Paternal Grandmother      Social History     Tobacco Use    Smoking status: Former Smoker    Smokeless tobacco: Never Used   Substance Use Topics    Alcohol use: Yes     Alcohol/week: 0.0 standard drinks     Comment: occasionally         Subjective:      Review of Systems  Rest of review of systems are negative, except as noted in HPI. Objective:     BP (!) 142/80   Pulse 81   Temp 98.1 °F (36.7 °C) (Temporal)   Resp 18   Ht 5' 4\" (1.626 m)   Wt 192 lb 6.4 oz (87.3 kg)   LMP 10/01/2021   SpO2 99%   BMI 33.03 kg/m²     Physical Exam         Patient is a well-appearing mildly hyponasal adult female in no acute distress. Procedure: Nasal endoscopy with debridement  Findings: After the use of topical tetracaine given the patient's history of not tolerating lidocaine and having no anesthesia from it, I introduced the nasal endoscope to remove a very large volume of soft tissue debris mucus and dried blood in the nasal vault. After removing the majority of it I found that patient had a very dense nasal synechia between the inferior turbinate and the septum blocking her airway to a significant degree. She also had a lateral synechiae formation between the middle turbinate and the lateral wall with purulence seen in the middle meatus going up into the anterior ethmoid air cells. These have the look of a chronic process. However the patient's anesthesia was minimal and she started having more more discomfort with instrumentation she was undergoing. As such I stopped deferring to proceed until I can get her under anesthesia to complete this process. This is what the patient understandably desired. Vitals reviewed. No results found.    Lab Results   Component Value Date     10/14/2021     12/22/2017     09/29/2017    K 4.1 10/14/2021    K 4.1 12/22/2017    K 4.4 09/29/2017     10/14/2021     12/22/2017     09/29/2017    CO2 25 10/14/2021    CO2 26 12/22/2017    CO2 23 09/29/2017    BUN 12 10/14/2021    BUN 13 12/22/2017    BUN 19 09/29/2017    CREATININE 0.61 10/14/2021    CREATININE 0.6 12/22/2017    CREATININE 0.6 09/29/2017    CALCIUM 8.0 10/14/2021    CALCIUM 8.3 12/22/2017    CALCIUM 8.0 09/29/2017    PROT 6.3 10/14/2021    PROT 6.6 09/29/2017    PROT 6.7 09/29/2016    LABALBU 3.9 10/14/2021    LABALBU 4.2 09/29/2017    LABALBU 4.1 09/29/2016    BILITOT 0.4 10/14/2021    BILITOT 0.2 09/29/2017    BILITOT 0.3 09/29/2016    ALKPHOS 45 10/14/2021    ALKPHOS 54 09/29/2017    ALKPHOS 48 09/29/2016    AST 13 10/14/2021    AST 10 09/29/2017    AST 13 09/29/2016    ALT 10 10/14/2021    ALT 8 09/29/2017    ALT 11 09/29/2016       All of the past medical history, past surgical history, family history,social history, allergies and current medications were reviewed with the patient. Assessment & Plan   Diagnoses and all orders for this visit:     Diagnosis Orders   1. Antrochoanal polyp  FL NASAL SCOPE,BX/RMV POLYP/DEBRID    Miscellaneous Surgery   2. Maxillary polyp of sinus  FL NASAL SCOPE,BX/RMV POLYP/DEBRID    Miscellaneous Surgery   3. Nasal obstruction  FL NASAL SCOPE,BX/RMV POLYP/DEBRID    Miscellaneous Surgery    CT FACIAL BONES WO CONTRAST   4. Chronic ethmoidal sinusitis  Miscellaneous Surgery    CT FACIAL BONES WO CONTRAST       Based on the patient's history and these physical findings, the patient has had some untoward healing following this operation and needs more extensive treatment that she can tolerate awake. I reviewed with her and with her , who was with us the entire time, the basis of my recommendations for general anesthesia based procedure. She agreed wholeheartedly given how uncomfortable the procedure was despite the use of tetracaine.   In all likelihood I do not proceed with an opening of the middle meatus as well as the takedown of this inferior turbinates  synechia she will likely develop chronic sinusitis in the anterior ethmoid air cells since acute sinusitis has already occurred. I will get a fresh IGS CT scan to enable navigation for the paranasal sinus work. .    I spent over 30 minutes of face-to-face time with the patient the majority of which was relegated to reviewing her complex history and planning this treatment program.      No follow-ups on file. **This report has been created using voice recognition software. It may contain minor errors which are inherent in voice recognition technology. **

## 2022-03-12 NOTE — OP NOTE
Operative Note      Patient: Doug Miller  YOB: 1965  MRN: [id number]    Date of Procedure: 3/11/2022    Pre-Op Diagnosis: ANTROCHOANAL POLYP, MAXIL. HIMANSHU POLYP, NASAL OBSTRUCTION, CHRONIC SINUSITIS    Post-Op Diagnosis: Same; nasopharyngeal granulation tissue; obstructing; frontal recess obstruction with frontal sinusitis       Procedure(s):  NASAL ENDOSCOPY WITH DEBRIDEMENT (TAKEDOWN OF MULTIPLE NASAL SYNCHIA AND RESECTION OF NASAL POLYPOSIS, LIMITED ANTERIOR ETHMOIDECTOMY USING IGS, SPLIT PLACEMENT   Frontal recess exploration; nasopharyngeal biopsies and resection of obstructing soft tissue    Surgeon(s):  Ajith Alfaro MD    Assistant:   * No surgical staff found *    Anesthesia: General    Estimated Blood Loss (mL): Less than 208 cc    Complications: None    Specimens:   ID Type Source Tests Collected by Time Destination   A : Nasal Pharangeal Tissue  Tissue Nasopharynx/Oropharynx SURGICAL PATHOLOGY Ajith Alfaro MD 3/11/2022 1614        Implants:  Implant Name Type Inv. Item Serial No.  Lot No. LRB No. Used Action   IMPLANT NSL L23MM MOMETASONE FUROATE PROPEL - [id number]  IMPLANT NSL L23MM MOMETASONE FUROATE PROPEL  INTERSECT ENT- [id number] Right 1 Implanted         Drains: * No LDAs found *    Findings: 1. Extensive mucositis with friable mucosa throughout the nasal vault including in the unoperated left side  2. Nasopharyngeal obstruction with hypertrophic soft tissue in the nasopharynx; biopsied  3. 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 Septal synechia lysed from inferior turbinate; highly friable soft tissues    Detailed Description of Procedure: The patient was taken to the operating room awake and placed in supine position.   General anesthesia was induced and the patient was intubated with a 7.0 endotracheal tube of the first attempt without difficulty or vital sign instability. The table was turned 90 degrees for the procedure. The patient was prepped and draped in usual fashion for an aseptic approach to the nasal airway. This included the placement atop a [place] CT navigation system transponder. I performed a timeout verifying the patient's identity and planned procedure. I then established excellent accuracy sphere less than 1 mm accuracy in the entire region of the right nasal airway from face to nasopharynx. Placing Afrin pledgets into the right nasal airway, I removed them and began visualizing the patient's nasal airway with a 30 degree optical telescope. I found it to be very abnormal for the presence of extensively friable mucosa of the turbinates and the septum. Virtually everything attached with my instruments seem to at least lose blood if not out right began to bleed. I visualized the patient's middle turbinate and it was fully fused to the lateral wall without any obvious space between the 2 structures. I used a [place] elevator to separate the 2 structures producing a substantial amount of bleeding in the process. Responded to topical Afrin on the pledget. Revision right anterior ethmoidectomy and frontal recess exploration: Using a 12 degree debrider blade calibrated to the CT navigation system, I entered the middle meatus and extended the anterior ethmoid resection the hopes of making a substantially increased space between the middle turbinate and the lateral wall to reduce the risk of recurrent synechia formation. The patient's CT scan was distinctly positive for frontal sinus obstruction with near complete opacification of the frontal sinus that was new since the operation that had just been performed. I took the dissection into the agar nasi cells and then entered the frontal sinus broadly. I rotated the tip of the resection blade to widen the recess substantially.   This produced a significant amount of bleeding and needed to be initially tamponade and then responded to topical Afrin by pledget. I verified the broad entrance to the frontal sinus using a calibrated curved suction catheter which easily and widely entered the frontal sinus in its central passage as well as the right lateral passage. I then turned my attention to  the synechia attaching the inferior turbinate to the septum. These mucous membrane surfaces were also friable as the others have been. There was extensive granulation-like tissue on the floor of the nose and binding the inferior turbinate to the floor of the nose as well. I used the debrider to separate the 2 structures and suction cautery to promote hemostasis and the more vigorous bleeding sites. I gradually made my way posteriorly in the nasal vault and encountered multiple soft tissue randi in the nasopharynx that I had not seen previously. I used navigation calibrated instruments to verify that I was in fact in the nasopharynx having trouble getting beyond the soft palate into the nasopharynx itself. I used [place] forceps to remove significant portions of this tissue handed off and placed in formalin for permanent section. And then used the debrider to remove additional amounts of this in order to open the nasopharynx. This was highly vascular tissue and blood briskly until suction cautery and topical Afrin brought under control. I then irrigated out the nose with copious amounts of sterile saline and was able to bring hemorrhage and under reasonable amount. I opted to place an absorbable stent in the frontal recess of the form of a large Propel stent which I was able to get well up into the frontal recess and distended the middle turbinate from the lateral nasal wall in the process. I then placed a [place] splint between the septum and the inferior turbinate and sutured it in place to the columella.   I suctioned out the nose again and irrigated out the nose again but found this I instrumented the left nasal airway, the site I had not operated on previously, but the nose was bleeding briskly. Took the placement of Afrin pledgets and further irrigation to bring this under control. I suctioned out the oropharynx as well and found the patient to have achieved hemostasis. As such I turned the patient back to anesthesia for reversal extubation in the operating room. This was carried out without incident and the patient was taken to recovery in satisfactory condition. Estimated blood loss in the case was approximately 200 cc and the patient received approximately a liter of intravenous lactated Ringer's intraoperatively. No blood products were transfused. No intraoperative complications were detected. Counts were considered accurate x3. I was present for performed the patient's entire operation myself. Disposition: The patient will be discharged home assuming she does acceptably well in first and second phases of recovery. Her tendency towards bleeding and the soft tissue masses in the nasopharynx will require further exploration within looking for a coagulopathy and to determine the histopathology of this abnormal tissue in the nasopharynx. She has a history of thyroid carcinoma and of apparent Graves' ophthalmopathy as noticed in the patient's CT scan and clearly evident on her photograph as compared to her current appearance.         Electronically signed by Cassandra Galarza MD on 3/11/2022 at 10:03 PM

## 2022-03-12 NOTE — PROGRESS NOTES

## 2022-03-12 NOTE — ANESTHESIA POSTPROCEDURE EVALUATION
Department of Anesthesiology  Postprocedure Note    Patient: Torsten Schmidt  MRN: [id number]  YOB: 1965  Date of evaluation: 3/11/2022  Time:  7:14 PM     Procedure Summary     Date: 03/11/22 Room / Location: [facility]  Jeremy Yahir    Anesthesia Start: 1525 Anesthesia Stop: 0854    Procedure: NASAL ENDOSCOPY WITH DEBRIDEMENT (TAKEDOWN OF MULTIPLE NASAL SYNCHIA AND RESECTION OF NASAL POLYPOSIS, LIMITED ANTERIOR ETHMOIDECTOMY USING IGS, SPLIT PLACEMENT (N/A Nose) Diagnosis: (ANTROCHOANAL POLYP, MAXIL. HIMANSHU POLYP, NASAL OBSTRUCTION, CHRONIC SINUSITIS)    Surgeons: Lucie Chow MD Responsible Provider: Rachelle Naidu DO    Anesthesia Type: general ASA Status: 2          Anesthesia Type: general    Tre Phase I: Tre Score: 10    Tre Phase II: Tre Score: 10    Last vitals: Reviewed and per EMR flowsheets.        Anesthesia Post Evaluation    Patient location during evaluation: PACU  Patient participation: complete - patient participated  Level of consciousness: awake and alert  Airway patency: patent  Nausea & Vomiting: no vomiting and no nausea  Complications: no  Cardiovascular status: hemodynamically stable  Respiratory status: acceptable  Hydration status: stable

## 2022-03-16 ENCOUNTER — OFFICE VISIT (OUTPATIENT)
Dept: ENT CLINIC | Age: 57
End: 2022-03-16
Payer: COMMERCIAL

## 2022-03-16 VITALS
TEMPERATURE: 97.1 F | OXYGEN SATURATION: 97 % | RESPIRATION RATE: 14 BRPM | BODY MASS INDEX: 33.13 KG/M2 | WEIGHT: 193 LBS | DIASTOLIC BLOOD PRESSURE: 78 MMHG | HEART RATE: 86 BPM | SYSTOLIC BLOOD PRESSURE: 118 MMHG

## 2022-03-16 DIAGNOSIS — H05.20 EXOPHTHALMOS: ICD-10-CM

## 2022-03-16 DIAGNOSIS — Z85.850 HX OF PAPILLARY THYROID CARCINOMA: ICD-10-CM

## 2022-03-16 DIAGNOSIS — J34.89 NASAL OBSTRUCTION: ICD-10-CM

## 2022-03-16 DIAGNOSIS — G47.33 OBSTRUCTIVE SLEEP APNEA: ICD-10-CM

## 2022-03-16 DIAGNOSIS — J34.3 HYPERTROPHY OF INFERIOR NASAL TURBINATE: ICD-10-CM

## 2022-03-16 DIAGNOSIS — Z98.890 S/P SINUS SURGERY: Primary | ICD-10-CM

## 2022-03-16 PROCEDURE — 99214 OFFICE O/P EST MOD 30 MIN: CPT | Performed by: OTOLARYNGOLOGY

## 2022-03-16 RX ORDER — KETOROLAC TROMETHAMINE 10 MG/1
10 TABLET, FILM COATED ORAL EVERY 6 HOURS PRN
Qty: 20 TABLET | Refills: 0 | Status: SHIPPED | OUTPATIENT
Start: 2022-03-16 | End: 2022-04-10 | Stop reason: ALTCHOICE

## 2022-03-16 RX ORDER — METHYLPREDNISOLONE 4 MG/1
TABLET ORAL
Qty: 2 KIT | Refills: 0 | Status: SHIPPED | OUTPATIENT
Start: 2022-03-16 | End: 2022-03-17

## 2022-03-16 RX ORDER — TRAMADOL HYDROCHLORIDE 50 MG/1
50 TABLET ORAL EVERY 6 HOURS PRN
Qty: 20 TABLET | Refills: 0 | Status: SHIPPED | OUTPATIENT
Start: 2022-03-16 | End: 2022-03-21

## 2022-03-16 NOTE — PROGRESS NOTES
[facility] [place] [facility]  [facility] EAR, NOSE AND THROAT  [address] [place] [id number]  Dept: [phone]  Dept Fax: [phone]  Loc: [phone]    Eloy Luis is a 56 y.o. female who was referred by No ref. provider found for:  Chief Complaint   Patient presents with    Post-Op Check     pt is here for post op. c/o of alot of pain,behind nose and under lip,'palateish'       HPI:     Eloy Luis is a 56 y.o. female status post removal of an antrochoanal polyp followed by a large inflammatory reaction as found on her first post op visit in the clinic. She did not tolerate endonasal instrumentation in the clinic despite the use of topical lidocaine to the degree of being possibly insensitive to this type of topical anesthetic. I took her to the operating room last week and found her airway to be highly inflamed with friable tissues throughout and the development of extensive synechia attachments to the lateral wall and the residual middle turbinate and between the inferior and middle turbinates and the septum. I opened up the synechia and placed a Silastic splint between the septum and the turbinates. I also biopsied granulation tissue from the nasopharynx that was concerning for possible neoplasia. The pathology report on this tissue was of benign disease but why it was in the nasopharynx is still not clear. The patient has a complex history of thyroid disease that includes having had a total thyroidectomy for Graves' disease in the context of Hashimoto's thyroiditis. Based on her telling me that she was having nuclear medicine scanning, I looked at her records from [address] [place] [address] and found out that she had been diagnosed with a papillary thyroid carcinoma that was metastatic to the lymph nodes of her right neck treated around 2015 for this disease.   I also found that she has had no nuclear medicine scans that I could find the results of since the single therapeutic scan that she had in 2015. She has had endocrinology follow-up since that time more directed at keeping her euthyroid. Of note is the fact that her clinic image is of that same area and clearly different than her current appearance without any signs of exophthalmos at that time where she clearly has clinically relevant exophthalmos now. She describes her eyes as frequently being dry and irritated where she previously had no such problems. She attributed to her contact lenses but she wore contact lenses at that time as well. On her CT scan for her sinus disease, she was found to have objective exophthalmos assessed by the radiologist as well. She cannot recall the last thyroid labs she had but is confident she has not had any nuclear medicine evaluation since the one that followed her radioactive iodine treatment. History: Allergies   Allergen Reactions    Tape Tushar [place] Tape] Dermatitis     bandaids     Current Outpatient Medications   Medication Sig Dispense Refill    ketorolac (TORADOL) 10 MG tablet Take 1 tablet by mouth every 6 hours as needed for Pain (Alternate with tramadol) 20 tablet 0    levothyroxine (SYNTHROID) 175 MCG tablet Take 200 mcg by mouth daily 200 mg mon thru sat 100 mcg on sunday      ciprofloxacin (CIPRO) 750 MG tablet Take 1 tablet by mouth 2 times daily for 10 days 20 tablet 0     No current facility-administered medications for this visit.      Past Medical History:   Diagnosis Date    BCC (basal cell carcinoma of skin)     Papillary carcinoma of thyroid ([facility] [address]     5/4/2015- Dr. Roge ESPINAL (postoperative nausea and vomiting)       Past Surgical History:   Procedure Laterality Date    CHOLECYSTECTOMY      DILATION AND CURETTAGE OF UTERUS      EAR SURGERY Right 12/29/2017    MOHS REPAIR BCC OF RIGHT ALA and biopsy of left cheek x2 and right calf x1 performed by Ema Parker MD at [place] [address]  05/04/2015 [facility] Right 12/29/2017    MOHS Repair BCC of Right Ala     PRE-MALIGNANT / BENIGN SKIN LESION EXCISION  4/18/2014    forehead    SINUS ENDOSCOPY N/A 2/4/2022    IMAGE GUIDED SURGERY: RIGHT MAXILLARY ANTROSTOMY WITH REMOVAL OF TISSUE FROM MAXILLARY SINUS; EXCISION OF MAXILLARY POLYPS OF SINUS, ANTERIOR ETHMOID ECTOMY performed by Thais Mohan MD at [address] N/A 3/11/2022    NASAL ENDOSCOPY WITH DEBRIDEMENT (TAKEDOWN OF MULTIPLE NASAL SYNCHIA AND RESECTION OF NASAL POLYPOSIS, LIMITED ANTERIOR ETHMOIDECTOMY USING IGS, SPLIT PLACEMENT performed by Thais Mohan MD at [address]  12/29/2017    Biopsy of Face x2 and Right calf x1    SUTURE REMOVAL Right 4/18/2014    Forearm, retained sutures    MARI AND BSO N/A 1/4/2022    HYSTERECTOMY ABDOMINAL TOTAL BILATERAL SALPINGO-OOPHORECTOMY performed by Lexy Juarez DO at [address] Bilateral 05/04/2015    Dr. Shawanda Murray- total with modified right radical neck dissection    TONSILLECTOMY       Family History   Problem Relation Age of Onset    Breast Cancer Paternal Grandmother      Social History     Tobacco Use    Smoking status: Former Smoker    Smokeless tobacco: Never Used   Substance Use Topics    Alcohol use: Yes     Alcohol/week: 0.0 standard drinks     Comment: occasionally         Subjective:      Review of Systems  Rest of review of systems are negative, except as noted in HPI. Objective:     /78 (Site: Right Upper Arm, Position: Sitting)   Pulse 86   Temp 97.1 °F (36.2 °C) (Infrared)   Resp 14   Wt 193 lb (87.5 kg)   LMP 10/01/2021   SpO2 97%   BMI 33.13 kg/m²     Physical Exam       On general physical exam the patient is a pleasant alert and cooperative adult female in no acute distress.   Her voice is mildly low in pitch and her speech pattern is moderately hyponasal.  She is able to breathe through both nasal airways but much better through the left than the right. Positive nasal valving was seen on the left side indicative of higher airflow. Her nasal septal splint was in good position on the right side and appeared to be at least partially patent. No signs of recent bleeding could be seen. The patient has significant exophthalmos with exposure conjunctivitis visible today. Exposure keratitis was not readily seen. Vitals reviewed. CT FACIAL BONES WO CONTRAST    Result Date: 3/1/2022   1. Interval right antrostomy/uncinectomy and right middle turbinectomy with moderate mucosal inflammation in the right maxillary sinus. 2. Moderate to severe right frontal and ethmoid sinusitis with air-fluid level in the frontal sinus is evidence for an acute component. 3. Mild mucosal inflammation elsewhere in the sinuses. 4. Stable leftward deviation of the nasal septum. 5. Mild to moderate degenerative changes of the left temporomandibular joint. **This report has been created using voice recognition software. It may contain minor errors which are inherent in voice recognition technology. ** Final report electronically signed by Dr. Aubrie Chavez MD on 3/1/2022 11:08 AM     Lab Results   Component Value Date     03/11/2022     10/14/2021     12/22/2017    K 4.0 03/11/2022    K 4.1 10/14/2021    K 4.1 12/22/2017     03/11/2022     10/14/2021     12/22/2017    CO2 21 03/11/2022    CO2 25 10/14/2021    CO2 26 12/22/2017    BUN 14 03/11/2022    BUN 12 10/14/2021    BUN 13 12/22/2017    CREATININE 0.5 03/11/2022    CREATININE 0.61 10/14/2021    CREATININE 0.6 12/22/2017    CALCIUM 8.0 03/11/2022    CALCIUM 8.0 10/14/2021    CALCIUM 8.3 12/22/2017    PROT 6.4 03/11/2022    PROT 6.3 10/14/2021    PROT 6.6 09/29/2017    LABALBU 3.9 03/11/2022    LABALBU 3.9 10/14/2021    LABALBU 4.2 09/29/2017    BILITOT 0.2 03/11/2022    BILITOT 0.4 10/14/2021    BILITOT 0.2 09/29/2017    ALKPHOS 58 03/11/2022    ALKPHOS 45 10/14/2021    ALKPHOS 54 09/29/2017    ALKPHOS 48 09/29/2016    AST 13 03/11/2022    AST 13 10/14/2021    AST 10 09/29/2017    ALT 10 03/11/2022    ALT 10 10/14/2021    ALT 8 09/29/2017       All of the past medical history, past surgical history, family history,social history, allergies and current medications were reviewed with the patient. Assessment & Plan   Diagnoses and all orders for this visit:     Diagnosis Orders   1. S/P sinus surgery  traMADol (ULTRAM) 50 MG tablet   2. Nasal obstruction  traMADol (ULTRAM) 50 MG tablet   3. Obstructive sleep apnea  traMADol (ULTRAM) 50 MG tablet   4. Hypertrophy of inferior nasal turbinate  traMADol (ULTRAM) 50 MG tablet   5. Hx of papillary thyroid carcinoma     6. Exophthalmos         Based on patient's history and physical findings, she has endonasal pathology that is difficult to explain. I recommended that we proceed with a general approach to this scale of inflammation by suppressing her with systemic steroids and bring her in next week for removal of the nasal splint and to see if I can clean her nose using tetracaine as an alternative mechanism for topical anesthesia. If that is tolerable, I may be able to hold the tendency toward synechia formation down by endonasal work that is done awake without having to put her off to sleep to do so. If not, she may need to go to sleep again to make sure that I can stay ahead of the scarring process that would cause additional downstream problems. Given her history of thyroid carcinoma and the apparent relatively long interval that she is gone through without any follow-up evaluation as to whether or not she has evidence of recurrent papillary thyroid malignancy, I recommended we get thyroid function blood tests followed by a neck CT with contrast to look for any abnormal masses.   In addition I will get a nuclear medicine scan for evidence of iodine avid tissue throughout her body given that she has developed her exophthalmos well after her treatment for her Graves' thyroiditis and papillary thyroid carcinoma. I explained all this in detail to the patient and her  who was with us the entire time to their satisfaction. The report understanding the basis of my recommendations and being willing to proceed as such. I spent over 30 minutes of face-to-face time with the patient the majority of which was dedicated to starting her the numerous complexities of her condition and planning this diagnostic approach. Return in about 4 weeks (around 4/13/2022) for Follow-up evaluation. **This report has been created using voice recognition software. It may contain minor errors which are inherent in voice recognition technology. **

## 2022-03-22 ENCOUNTER — OFFICE VISIT (OUTPATIENT)
Dept: ENT CLINIC | Age: 57
End: 2022-03-22

## 2022-03-22 ENCOUNTER — PROCEDURE VISIT (OUTPATIENT)
Dept: ENT CLINIC | Age: 57
End: 2022-03-22
Payer: COMMERCIAL

## 2022-03-22 VITALS
HEART RATE: 81 BPM | TEMPERATURE: 97.1 F | BODY MASS INDEX: 33.47 KG/M2 | DIASTOLIC BLOOD PRESSURE: 80 MMHG | SYSTOLIC BLOOD PRESSURE: 118 MMHG | OXYGEN SATURATION: 99 % | RESPIRATION RATE: 14 BRPM | WEIGHT: 195 LBS

## 2022-03-22 DIAGNOSIS — J34.3 HYPERTROPHY OF INFERIOR NASAL TURBINATE: ICD-10-CM

## 2022-03-22 DIAGNOSIS — J34.2 DEVIATED NASAL SEPTUM: ICD-10-CM

## 2022-03-22 DIAGNOSIS — E89.0 HISTORY OF TOTAL THYROIDECTOMY: ICD-10-CM

## 2022-03-22 DIAGNOSIS — J32.9 RECURRENT SINUS INFECTIONS: ICD-10-CM

## 2022-03-22 DIAGNOSIS — J33.0 ANTROCHOANAL POLYP: ICD-10-CM

## 2022-03-22 DIAGNOSIS — Z98.890 S/P SINUS SURGERY: Primary | ICD-10-CM

## 2022-03-22 DIAGNOSIS — H05.20 EXOPHTHALMOS: ICD-10-CM

## 2022-03-22 DIAGNOSIS — J34.89 NASAL OBSTRUCTION: ICD-10-CM

## 2022-03-22 DIAGNOSIS — E05.00 GRAVES' OPHTHALMOPATHY: ICD-10-CM

## 2022-03-22 PROCEDURE — 31237 NSL/SINS NDSC SURG BX POLYPC: CPT | Performed by: OTOLARYNGOLOGY

## 2022-03-22 PROCEDURE — 99024 POSTOP FOLLOW-UP VISIT: CPT | Performed by: OTOLARYNGOLOGY

## 2022-03-22 NOTE — PROGRESS NOTES
[facility] [place] [facility]  [facility]SE [facility]  [address] [place] [address] [place] [address]  Dept: [phone]  Dept Fax: [phone]  Loc: [phone]    Karma Yang is a 49 y.o. female who was referred by No ref. provider found for:  Chief Complaint   Patient presents with    Post-Op Check     pt is here for post op     HPI:     Karma Yang is a 49 y.o. female      History: Allergies   Allergen Reactions    Tape Michelle Rice Tape] Dermatitis     bandaids     Current Outpatient Medications   Medication Sig Dispense Refill    methylPREDNISolone (MEDROL DOSEPACK) 4 MG tablet 4/day x 3 days, 3/day x 3 days, 2/day x 3 days then, 1/day x 3 days, and stop 2 kit 0    ketorolac (TORADOL) 10 MG tablet Take 1 tablet by mouth every 6 hours as needed for Pain (Alternate with tramadol) 20 tablet 0    levothyroxine (SYNTHROID) 175 MCG tablet Take 200 mcg by mouth daily 200 mg mon thru sat 100 mcg on sunday       No current facility-administered medications for this visit.      Past Medical History:   Diagnosis Date    BCC (basal cell carcinoma of skin)     Papillary carcinoma of thyroid ([facility] [address]     5/4/2015- Dr. Curtis ESPINAL (postoperative nausea and vomiting)       Past Surgical History:   Procedure Laterality Date    CHOLECYSTECTOMY      DILATION AND CURETTAGE OF UTERUS      EAR SURGERY Right 12/29/2017    MOHS REPAIR BCC OF RIGHT ALA and biopsy of left cheek x2 and right calf x1 performed by Theodore Benavidez MD at [place] [address]  05/04/2015    [address] [place] [address] MOHS SURGERY Right 12/29/2017    MOHS Repair BCC of Right Ala     PRE-MALIGNANT / BENIGN SKIN LESION EXCISION  4/18/2014    forehead    SINUS ENDOSCOPY N/A 2/4/2022    IMAGE GUIDED SURGERY: RIGHT MAXILLARY ANTROSTOMY WITH REMOVAL OF TISSUE FROM MAXILLARY SINUS; EXCISION OF MAXILLARY POLYPS OF SINUS, ANTERIOR ETHMOID ECTOMY performed by Timmy Lindsay Alexia Kwong MD at [facility] N/A 3/11/2022    NASAL ENDOSCOPY WITH DEBRIDEMENT (TAKEDOWN OF MULTIPLE NASAL SYNCHIA AND RESECTION OF NASAL POLYPOSIS, LIMITED ANTERIOR ETHMOIDECTOMY USING IGS, SPLIT PLACEMENT performed by Elda Puri MD at [address] [facility]  12/29/2017    Biopsy of Face x2 and Right calf x1    SUTURE REMOVAL Right 4/18/2014    Forearm, retained sutures    MARI AND BSO N/A 1/4/2022    HYSTERECTOMY ABDOMINAL TOTAL BILATERAL SALPINGO-OOPHORECTOMY performed by Candy Rachel DO at [address] Bilateral 05/04/2015    Dr. Alek Vega- total with modified right radical neck dissection    TONSILLECTOMY       Family History   Problem Relation Age of Onset    Breast Cancer Paternal Grandmother      Social History     Tobacco Use    Smoking status: Former Smoker    Smokeless tobacco: Never Used   Substance Use Topics    Alcohol use: Yes     Alcohol/week: 0.0 standard drinks     Comment: occasionally         Subjective:      Review of Systems  Rest of review of systems are negative, except as noted in HPI. Objective:     /80 (Site: Right Upper Arm, Position: Sitting)   Pulse 81   Temp 97.1 °F (36.2 °C)   Resp 14   Wt 195 lb (88.5 kg)   LMP 10/01/2021   SpO2 99%   BMI 33.47 kg/m²     PHYSICAL EXAM  Constitutional: Oriented to person, place, and time. Appears stated aged. Appears well-developed and well-nourished. Voice and speech pattern appropriate for age and gender. No distress. No stridor or audible wheezing. HENT:   Head: Normocephalic and atraumatic. Right Ear:  External ear normal. Tympanic membrane intact. Middle ear aerated. Left Ear:  External ear normal. Tympanic membrane intact. Middle ear aerated. Nose:  External nose normal. Nasal mucosa normal. No lesions noted. Mouth/Throat:  Fair dentition. Mallampati *** oral aperture. Oral cavity mucosa normal, no masses or lesions noted. Soft palate ***. Uvula ***. Tonsils ***.    Eyes: Pupils are equal, round, and reactive to light. Conjunctivae and EOM are normal.   Neck:  Normal range of motion. Neck supple. No JVD present. No tracheal deviation present. No thyromegaly present. No cervical lymphadenopathy noted. Cardiovascular:  Normal rate. Pulmonary/Chest:  Effort normal. No stridor or stertor. No respiratory distress. Musculoskeletal:  Normal range of motion. No edema or lymphadenopathy. Neurological:  Alert and oriented to person, place, and time. Cranial nerve II-XII grossly intact. Skin:  Skin is warm. No erythema. Psychiatric:  Normal mood and affect. Behavior is normal.     Vitals reviewed. Data:  All of the past medical history, past surgical history, family history,social history, allergies and current medications were reviewed with the patient. Assessment & Plan   Diagnoses and all orders for this visit:     Diagnosis Orders   1. S/P sinus surgery     2. Hypertrophy of inferior nasal turbinate     3. Antrochoanal polyp     4. Recurrent sinus infections     5. Deviated nasal septum         The findings were explained and her questions were answered. ***      No follow-ups on file. **This report has been created using voice recognition software. It may contain minor errors which are inherent in voice recognition technology. **

## 2022-03-23 PROBLEM — Z90.89 HISTORY OF TOTAL THYROIDECTOMY: Status: ACTIVE | Noted: 2022-03-23

## 2022-03-23 PROBLEM — E89.0 HISTORY OF TOTAL THYROIDECTOMY: Status: ACTIVE | Noted: 2022-03-23

## 2022-03-23 PROBLEM — H05.20 EXOPHTHALMOS: Status: ACTIVE | Noted: 2022-03-23

## 2022-03-23 PROBLEM — Z98.890 HISTORY OF TOTAL THYROIDECTOMY: Status: ACTIVE | Noted: 2022-03-23

## 2022-03-23 PROBLEM — E05.00 GRAVES' OPHTHALMOPATHY: Status: ACTIVE | Noted: 2022-03-23

## 2022-03-24 ENCOUNTER — TELEPHONE (OUTPATIENT)
Dept: ENT CLINIC | Age: 57
End: 2022-03-24

## 2022-03-24 DIAGNOSIS — Z98.890 S/P SINUS SURGERY: Primary | ICD-10-CM

## 2022-03-24 NOTE — PROGRESS NOTES
[facility] [place] [facility]  [facility] EAR, NOSE AND THROAT  [address] [place] [address] [place] [address]  Dept: [phone]  Dept Fax: [phone]  Loc: [phone]    Felicia Fitzgerald is a 49 y.o. female who was referred by No ref. provider found for:  No chief complaint on file. HPI:     Felicia Fitzgerald is a 49 y.o. female     Felicia Fitzgerald is a 49 y.o. female is 1 week status post nasal debridement and placement of a nasal splint between the inferior turbinate and the septum and the nasal stent within the frontal recess both on the right side. The patient reports having done acceptably well following the procedure though she is complaining of restlessness and initially difficulty sleeping at night in a manner consistent with the nasal steroid dose I placed her on to block the recurrence of the inflammation in her nasal vault. She is currently on 3 pills/day and has been on that dosage for the last 2 days. She has 2 more to go on the step with tomorrow 4-day steps thereafter. She has had very little bleeding and not a lot of pain. Her  is with her and concurs. I specifically asked about her eyes. She reports they feel about as typical.  We spoke additionally about the potential that her thyroid disease may still be affecting her eyes from the effect that the thyroid antibodies can have on the periorbital soft tissues. History:      Allergies   Allergen Reactions    Tape Yue Helms Tape] Dermatitis     bandaids     Current Outpatient Medications   Medication Sig Dispense Refill    methylPREDNISolone (MEDROL DOSEPACK) 4 MG tablet 4/day x 3 days, 3/day x 3 days, 2/day x 3 days then, 1/day x 3 days, and stop 2 kit 0    ketorolac (TORADOL) 10 MG tablet Take 1 tablet by mouth every 6 hours as needed for Pain (Alternate with tramadol) 20 tablet 0    levothyroxine (SYNTHROID) 175 MCG tablet Take 200 mcg by mouth daily 200 mg mon thru sat 100 mcg on sunday No current facility-administered medications for this visit. Past Medical History:   Diagnosis Date    BCC (basal cell carcinoma of skin)     Papillary carcinoma of thyroid ([facility] [address]     5/4/2015- Dr. Brandi ESPINAL (postoperative nausea and vomiting)       Past Surgical History:   Procedure Laterality Date    CHOLECYSTECTOMY      DILATION AND CURETTAGE OF UTERUS      EAR SURGERY Right 12/29/2017    MOHS REPAIR BCC OF RIGHT ALA and biopsy of left cheek x2 and right calf x1 performed by Nils Kasper MD at [place] [address]  05/04/2015    [address] [place] [address] Right 12/29/2017    MOHS Repair BCC of Right Ala     PRE-MALIGNANT / BENIGN SKIN LESION EXCISION  4/18/2014    forehead    SINUS ENDOSCOPY N/A 2/4/2022    IMAGE GUIDED SURGERY: RIGHT MAXILLARY ANTROSTOMY WITH REMOVAL OF TISSUE FROM MAXILLARY SINUS; EXCISION OF MAXILLARY POLYPS OF SINUS, ANTERIOR ETHMOID ECTOMY performed by Salazar Kiser MD at [facility] N/A 3/11/2022    NASAL ENDOSCOPY WITH DEBRIDEMENT (TAKEDOWN OF MULTIPLE NASAL SYNCHIA AND RESECTION OF NASAL POLYPOSIS, LIMITED ANTERIOR ETHMOIDECTOMY USING IGS, SPLIT PLACEMENT performed by Salazar Kiser MD at [address]  12/29/2017    Biopsy of Face x2 and Right calf x1    SUTURE REMOVAL Right 4/18/2014    Forearm, retained sutures    MARI AND BSO N/A 1/4/2022    HYSTERECTOMY ABDOMINAL TOTAL BILATERAL SALPINGO-OOPHORECTOMY performed by Kait Lyon DO at [address] Bilateral 05/04/2015    Dr. Brandi Her- total with modified right radical neck dissection    TONSILLECTOMY       Family History   Problem Relation Age of Onset    Breast Cancer Paternal Grandmother      Social History     Tobacco Use    Smoking status: Former Smoker    Smokeless tobacco: Never Used   Substance Use Topics    Alcohol use:  Yes     Alcohol/week: 0.0 standard drinks     Comment: occasionally Subjective:      Review of Systems  Rest of review of systems are negative, except as noted in HPI. Objective:     LMP 10/01/2021     Physical Exam           On general physical exam the patient is a pleasant alert and cooperative adult female in no acute distress. Her voice and her speech pattern are both within normal limits for age and gender. I heard no throat clearing coughing or inspiratory stridor. Her voice was not hyponasal.  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 Procedure: Nasal endoscopy with debridement; Bilateral  Findings: Following the spraying of topical decongestant followed by 3 puffs of atomized tetracaine into the left nostril and 5 puffs into the right nostril, I examined both sides with a 30 degree optical telescope and used a Purvis tip suction to debride abnormal soft tissues and mucopurulence. Rigid instrumentation was not necessary. The right nasal airway nasal splint was removed by cutting the columella stitch and delivering the nasal splint through the nostril. I removed the suture material as well. The patient's nasal septum was vertical and the synechia that had formed between the nasal septum and the inferior turbinate was definitively lysed and did not appear to be impending. There was mucopurulence of soft tissue debris in the floor of the nose which I suctioned away and debrided mechanically. I then remove soft tissue debris off of the lateral nasal wall and could easily see the maxillary sinus with a marked diminution of the polypoid degeneration of the mucosa that had been florid during her operation under general anesthesia. No signs of recent bleeding could be seen. She had mucopurulence in the frontal recess as well. The Propel nasal frontal splint still in good position and holding the middle turbinate away from the lateral nasal wall where previously had been fully laterally fused 2. There was some turbid secretions coming down from the frontal recess which I suctioned away. There was also some soft tissue debris which I disturb at the tip of the suction catheter and then suctioned away. The patient was uncomfortable but was clearly less painful than she had been during the debridement procedure proceeded her general anesthesia based care. The patient's nasopharynx was also visualized and appeared to be patent without new growths of that granulation tissue/polyps that had been previously seen. On the left side the patient had some erythema about the base of the nasal airway but no synechia was seen between the septum and the inferior turbinate. No mucopurulence was seen. I debrided away some inspissated mucus and posteriorly based soft tissue debris that had likely come down from the resection of the polyp like soft tissues that had accumulated in the nasopharynx bilaterally and had been resected. No bleeding was started. The patient tolerated this instrumentation well as well. Endoscopy images of today's procedure:    Right side prior to start of debridement    Right side after started debridement    Right side lateral nasal wall    Right side lateral wall and frontal recess prior to frontal recess debridement    Left side view of middle turbinate with some inspissated mucus in the middle meatus    Closer view of the middle turbinate    Superior view of the left side          Vitals reviewed. CT FACIAL BONES WO CONTRAST    Result Date: 3/1/2022   1. Interval right antrostomy/uncinectomy and right middle turbinectomy with moderate mucosal inflammation in the right maxillary sinus. 2. Moderate to severe right frontal and ethmoid sinusitis with air-fluid level in the frontal sinus is evidence for an acute component. 3. Mild mucosal inflammation elsewhere in the sinuses. 4. Stable leftward deviation of the nasal septum.  5. Mild to moderate degenerative changes of the left temporomandibular joint. **This report has been created using voice recognition software. It may contain minor errors which are inherent in voice recognition technology. ** Final report electronically signed by Dr. Estrada Perrin MD on 3/1/2022 11:08 AM     Lab Results   Component Value Date     03/11/2022     10/14/2021     12/22/2017    K 4.0 03/11/2022    K 4.1 10/14/2021    K 4.1 12/22/2017     03/11/2022     10/14/2021     12/22/2017    CO2 21 03/11/2022    CO2 25 10/14/2021    CO2 26 12/22/2017    BUN 14 03/11/2022    BUN 12 10/14/2021    BUN 13 12/22/2017    CREATININE 0.5 03/11/2022    CREATININE 0.61 10/14/2021    CREATININE 0.6 12/22/2017    CALCIUM 8.0 03/11/2022    CALCIUM 8.0 10/14/2021    CALCIUM 8.3 12/22/2017    PROT 6.4 03/11/2022    PROT 6.3 10/14/2021    PROT 6.6 09/29/2017    LABALBU 3.9 03/11/2022    LABALBU 3.9 10/14/2021    LABALBU 4.2 09/29/2017    BILITOT 0.2 03/11/2022    BILITOT 0.4 10/14/2021    BILITOT 0.2 09/29/2017    ALKPHOS 58 03/11/2022    ALKPHOS 45 10/14/2021    ALKPHOS 54 09/29/2017    ALKPHOS 48 09/29/2016    AST 13 03/11/2022    AST 13 10/14/2021    AST 10 09/29/2017    ALT 10 03/11/2022    ALT 10 10/14/2021    ALT 8 09/29/2017       All of the past medical history, past surgical history, family history,social history, allergies and current medications were reviewed with the patient. Assessment & Plan   Diagnoses and all orders for this visit:     Diagnosis Orders   1. S/P sinus surgery     2. Hypertrophy of inferior nasal turbinate     3. Nasal obstruction     4. Exophthalmos     5. Graves' ophthalmopathy     6.  History of total thyroidectomy         Based on the patient's history and these physical findings, I think it is likely that she is going to need another round of soft tissue exploration and debridement under general anesthesia sometime in the next few weeks to maintain the patient's airway patency and paranasal sinus patency in the context of this very aggressive inflammatory process. In the meantime I will also get her in to see Shanta Chambers and have her allergic tendencies evaluated to determine if she has some extraordinary allergic dimension to this process that could explain the florid regeneration of granulation and polypoid tissues within such a short interval following her choanal polyp resection. 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 I recommended that her  observe her while she sleeps 1 or 2 nights over the next week to see if she fully closes her eyes. It is highly likely that she is not. In addition I will get thyroid function studies that include Graves' factors with the potential that she continues to have a high titer of these agents that may directly affect the soft tissues of the orbital contents despite the fact that the goiter has been removed. I will see her back in approximately 2 weeks for an interval exam and likely another awake debridement in preparation for her next general anesthesia based procedure. I will get a high-resolution IGS format CT in preparation for that operation. Return in about 4 weeks (around 4/19/2022) for Follow-up evaluation and possible nasal endoscopy. **This report has been created using voice recognition software. It may contain minor errors which are inherent in voice recognition technology. **

## 2022-03-24 NOTE — TELEPHONE ENCOUNTER
Vonzell Sacks was seen on 3/22/22 in the office for a post op visit. She is scheduled for an IGS CT scan on 4/5/22 and will come to the office that same day for an awake debridement. She is scheduled for a nasal endoscopy with bilateral debridement in surgery on 4/15/22. Please place orders. Thank you! Dr Kamron Huynh notes from 3/22/22: I will see her back in approximately 2 weeks for an interval exam and likely another awake debridement in preparation for her next general anesthesia based procedure.   I will get a high-resolution IGS format CT in preparation for that operation.

## 2022-03-28 RX ORDER — CIPROFLOXACIN 750 MG/1
750 TABLET, FILM COATED ORAL 2 TIMES DAILY
Qty: 20 TABLET | Refills: 0 | Status: SHIPPED | OUTPATIENT
Start: 2022-03-28 | End: 2022-04-07

## 2022-04-01 ENCOUNTER — TELEPHONE (OUTPATIENT)
Dept: ENT CLINIC | Age: 57
End: 2022-04-01

## 2022-04-01 NOTE — TELEPHONE ENCOUNTER
Please look at the schedule to see how Dr HARRIS can accommodate her appt or where the patient can be rescheduled. Her appt cannot be put off.

## 2022-04-01 NOTE — TELEPHONE ENCOUNTER
Patient called and stated that she is scheduled for 4/5/22 but will not be able to make that appointment. She can make the CT scan on 4/5/22 just not the follow up appointment as her  drives her. Please advise.

## 2022-04-05 ENCOUNTER — HOSPITAL ENCOUNTER (OUTPATIENT)
Dept: CT IMAGING | Age: 57
Discharge: HOME | End: 2022-04-05
Payer: COMMERCIAL

## 2022-04-05 DIAGNOSIS — J34.3 HYPERTROPHY OF INFERIOR NASAL TURBINATE: ICD-10-CM

## 2022-04-05 DIAGNOSIS — J34.2 DEVIATED NASAL SEPTUM: ICD-10-CM

## 2022-04-05 DIAGNOSIS — Z98.890 S/P SINUS SURGERY: ICD-10-CM

## 2022-04-05 DIAGNOSIS — J33.0 ANTROCHOANAL POLYP: ICD-10-CM

## 2022-04-05 DIAGNOSIS — J32.9 RECURRENT SINUS INFECTIONS: ICD-10-CM

## 2022-04-05 PROCEDURE — 70486 CT MAXILLOFACIAL W/O DYE: CPT

## 2022-04-07 NOTE — PROGRESS NOTES
Weight: 193 lb (87.5 kg) Weight Method: Actual Following instructions given to patient, who states understanding:    NPO after midnight  [employer] and 's license  Wear comfortable clean clothing  Do not bring jewelry   Shower night before and morning of surgery with a liquid antibacterial soap  Bring medications in original bottles  Follow all instructions given by your physician   needed at discharge  Call [phone] for any questions  Report to [facility] on 2nd floor  If you would become ill prior to surgery, please call the surgeon  May have a visitor with you, we request that you limit to 2 visitors in pre-op area  Please bring and wear mask

## 2022-04-11 ENCOUNTER — OFFICE VISIT (OUTPATIENT)
Dept: ENT CLINIC | Age: 57
End: 2022-04-11
Payer: COMMERCIAL

## 2022-04-11 ENCOUNTER — PREP FOR PROCEDURE (OUTPATIENT)
Dept: ENT CLINIC | Age: 57
End: 2022-04-11

## 2022-04-11 VITALS
DIASTOLIC BLOOD PRESSURE: 84 MMHG | WEIGHT: 197 LBS | RESPIRATION RATE: 16 BRPM | BODY MASS INDEX: 33.81 KG/M2 | SYSTOLIC BLOOD PRESSURE: 122 MMHG | OXYGEN SATURATION: 97 % | HEART RATE: 84 BPM | TEMPERATURE: 97.1 F

## 2022-04-11 DIAGNOSIS — J33.8 MAXILLARY POLYP OF SINUS: ICD-10-CM

## 2022-04-11 DIAGNOSIS — J33.0 ANTROCHOANAL POLYP: ICD-10-CM

## 2022-04-11 DIAGNOSIS — E05.00 GRAVES' OPHTHALMOPATHY: ICD-10-CM

## 2022-04-11 DIAGNOSIS — J34.3 HYPERTROPHY OF INFERIOR NASAL TURBINATE: ICD-10-CM

## 2022-04-11 DIAGNOSIS — Z98.890 S/P SINUS SURGERY: Primary | ICD-10-CM

## 2022-04-11 DIAGNOSIS — J34.89 NASAL OBSTRUCTION: ICD-10-CM

## 2022-04-11 DIAGNOSIS — J32.9 RECURRENT SINUS INFECTIONS: ICD-10-CM

## 2022-04-11 DIAGNOSIS — E89.0 HISTORY OF TOTAL THYROIDECTOMY: ICD-10-CM

## 2022-04-11 DIAGNOSIS — H05.20 EXOPHTHALMOS: ICD-10-CM

## 2022-04-11 PROCEDURE — 99214 OFFICE O/P EST MOD 30 MIN: CPT | Performed by: OTOLARYNGOLOGY

## 2022-04-11 RX ORDER — SODIUM CHLORIDE 0.9 % (FLUSH) 0.9 %
5-40 SYRINGE (ML) INJECTION EVERY 12 HOURS SCHEDULED
Status: CANCELLED | OUTPATIENT
Start: 2022-04-11

## 2022-04-11 RX ORDER — SODIUM CHLORIDE 9 MG/ML
25 INJECTION, SOLUTION INTRAVENOUS PRN
Status: CANCELLED | OUTPATIENT
Start: 2022-04-11

## 2022-04-11 RX ORDER — SODIUM CHLORIDE 0.9 % (FLUSH) 0.9 %
5-40 SYRINGE (ML) INJECTION PRN
Status: CANCELLED | OUTPATIENT
Start: 2022-04-11

## 2022-04-11 NOTE — PROGRESS NOTES
[facility] [place] [facility]  [facility] EAR, NOSE AND THROAT  [address] [place] [address]  Dept: [phone]  Dept Fax: [phone]  Loc: [phone]    Rian Turpin is a 56 y.o. female who was referred by No ref. provider found for:  Chief Complaint   Patient presents with    Post-Op Check     pt is here for post op       HPI:     Rian Turpin is a 56 y.o. female who reports a dramatic improvement in her overall nasal comfort both in her ability to breathe through both sides of her nose and in the degree of irritation and inflammation that she has been experiencing. In addition she reports the degree of irritation in her eyes, having exophthalmos related to Graves' disease, despite the fact that nothing has been done to specifically treat her Graves' ophthalmopathy since that was noticed as an issue early in her care with me. She denies any more epistaxis and reports that she is breathing comfortably and without even snoring according to her  except one time in the recent past when she had some alcohol before going to bed. In essence she is very pleased that she seems to be improving on all fronts at least for the time being. History: Allergies   Allergen Reactions    Tape Boris German Tape] Dermatitis     bandaids     Current Outpatient Medications   Medication Sig Dispense Refill    levothyroxine (SYNTHROID) 175 MCG tablet Take 200 mcg by mouth daily 200 mg mon thru sat 100 mcg on sunday       No current facility-administered medications for this visit.      Past Medical History:   Diagnosis Date    BCC (basal cell carcinoma of skin)     Papillary carcinoma of thyroid ([facility] [address]     5/4/2015- Dr. Chas ESPINAL (postoperative nausea and vomiting)       Past Surgical History:   Procedure Laterality Date    CHOLECYSTECTOMY      DILATION AND CURETTAGE OF UTERUS      EAR SURGERY Right 12/29/2017    MOHS REPAIR BCC OF RIGHT ALA and biopsy of left cheek x2 and right calf x1 performed by Ben Sinclair MD at [facility]  05/04/2015    [facility] Right 12/29/2017    MOHS Repair BCC of Right Ala     PRE-MALIGNANT / BENIGN SKIN LESION EXCISION  4/18/2014    forehead    SINUS ENDOSCOPY N/A 2/4/2022    IMAGE GUIDED SURGERY: RIGHT MAXILLARY ANTROSTOMY WITH REMOVAL OF TISSUE FROM MAXILLARY SINUS; EXCISION OF MAXILLARY POLYPS OF SINUS, ANTERIOR ETHMOID ECTOMY performed by Dave Humphrey MD at [address] [place] [address] N/A 3/11/2022    NASAL ENDOSCOPY WITH DEBRIDEMENT (TAKEDOWN OF MULTIPLE NASAL SYNCHIA AND RESECTION OF NASAL POLYPOSIS, LIMITED ANTERIOR ETHMOIDECTOMY USING IGS, SPLIT PLACEMENT performed by Dave Humphrey MD at [address] [place] [address]  12/29/2017    Biopsy of Face x2 and Right calf x1    SUTURE REMOVAL Right 4/18/2014    Forearm, retained sutures    MARI AND BSO N/A 1/4/2022    HYSTERECTOMY ABDOMINAL TOTAL BILATERAL SALPINGO-OOPHORECTOMY performed by Megan Douglas DO at [place] [address] Bilateral 05/04/2015    Dr. Nasra Gao- total with modified right radical neck dissection    TONSILLECTOMY       Family History   Problem Relation Age of Onset    Breast Cancer Paternal Grandmother      Social History     Tobacco Use    Smoking status: Former Smoker    Smokeless tobacco: Never Used   Substance Use Topics    Alcohol use: Yes     Alcohol/week: 0.0 standard drinks     Comment: occasionally         Subjective:      Review of Systems  Rest of review of systems are negative, except as noted in HPI. Objective:     /84 (Site: Left Upper Arm, Position: Sitting)   Pulse 84   Temp 97.1 °F (36.2 °C)   Resp 16   Wt 197 lb (89.4 kg)   LMP 10/01/2021   SpO2 97%   BMI 33.81 kg/m²     Physical Exam       On general physical exam the patient is a pleasant alert and cooperative middle-aged female in no acute distress.   Her voice and her speech pattern are within normal limits for age and gender. I heard no throat clearing coughing or inspiratory stridor. She was neither hyper nor hyponasal.      The patient's eyes were noted to be proptotic and only had mild conjunctivitis. There was no excess tearing or crusting on either side. Her extraocular movements appear to be conjugate. On anterior inspection the patient had no signs of recent bleeding and was moving air well through both nostrils. She had mild nasal valving. Her oropharynx was abnormal for the presence of a high tongue base and a low soft palate but I could see all the way across the soft palate with just gentle pressure on the tongue base. I saw no mucopurulence draining in the nasopharynx. I saw no signs of recent bleeding. I saw no soft tissue protruding from the nasopharynx. Vitals reviewed. CT FACIAL BONES WO CONTRAST    Result Date: 4/5/2022   1. Moderate severity mucosal thickening in both maxillary sinuses. 2. Mucosal thickening in the ethmoid air cells bilaterally. This is worse on the right than the left. 3. Mucosal thickening in the right frontal sinus. **This report has been created using voice recognition software. It may contain minor errors which are inherent in voice recognition technology. ** Final report electronically signed by Dr. Stone Dobbins on 4/5/2022 9:21 AM     Lab Results   Component Value Date     03/11/2022     10/14/2021     12/22/2017    K 4.0 03/11/2022    K 4.1 10/14/2021    K 4.1 12/22/2017     03/11/2022     10/14/2021     12/22/2017    CO2 21 03/11/2022    CO2 25 10/14/2021    CO2 26 12/22/2017    BUN 14 03/11/2022    BUN 12 10/14/2021    BUN 13 12/22/2017    CREATININE 0.5 03/11/2022    CREATININE 0.61 10/14/2021    CREATININE 0.6 12/22/2017    CALCIUM 8.0 03/11/2022    CALCIUM 8.0 10/14/2021    CALCIUM 8.3 12/22/2017    PROT 6.4 03/11/2022    PROT 6.3 10/14/2021    PROT 6.6 09/29/2017    LABALBU 3.9 03/11/2022 LABALBU 3.9 10/14/2021    LABALBU 4.2 09/29/2017    BILITOT 0.2 03/11/2022    BILITOT 0.4 10/14/2021    BILITOT 0.2 09/29/2017    ALKPHOS 58 03/11/2022    ALKPHOS 45 10/14/2021    ALKPHOS 54 09/29/2017    ALKPHOS 48 09/29/2016    AST 13 03/11/2022    AST 13 10/14/2021    AST 10 09/29/2017    ALT 10 03/11/2022    ALT 10 10/14/2021    ALT 8 09/29/2017       All of the past medical history, past surgical history, family history,social history, allergies and current medications were reviewed with the patient. Assessment & Plan   Diagnoses and all orders for this visit:     Diagnosis Orders   1. S/P sinus surgery     2. Hypertrophy of inferior nasal turbinate     3. Nasal obstruction     4. Exophthalmos     5. Graves' ophthalmopathy     6. History of total thyroidectomy     7. Antrochoanal polyp     8. Recurrent sinus infections     9. Maxillary polyp of sinus         Based on the patient's history and her presentation today as well as my review of her CT scan which was conducted last week, I believe the patient has made dramatic improvement in the whole spectrum of problems that were part of her condition, some of which were very serious and very concerning. As such I recommended that we proceed as planned with the debridement for this coming Friday in order to make sure that her airway patency is being sustained and that no new pathology has emerged within the nasal vault. At that same time I will likely remove her frontal recess middle meatus stent on the right side and take new cultures if I find mucopurulence within the nasal vault. I explained all this in detail to the patient and her  to their satisfaction. They reported being pleased with the outcome of the visit as well as the outcome of her care thus far and be willing to proceed as such.     I spent over 30 minutes of face-to-face time with the patient the majority of which was dedicated to reviewing her complex history and planning this care program.    Return in about 4 weeks (around 5/9/2022). **This report has been created using voice recognition software. It may contain minor errors which are inherent in voice recognition technology. **

## 2022-04-12 ENCOUNTER — TELEPHONE (OUTPATIENT)
Dept: ENT CLINIC | Age: 57
End: 2022-04-12

## 2022-04-12 NOTE — TELEPHONE ENCOUNTER
Dr Anita Rucker spoke to Dr Cristofer Roe from [facility] at [phone] for a peer to peer discussion regarding the nasal endoscopy with debridement scheduled this Friday 4/15/22. He agreed to treatment. Will look for the denial to be now authorized.

## 2022-04-15 ENCOUNTER — HOSPITAL ENCOUNTER (OUTPATIENT)
Age: 57
Setting detail: OUTPATIENT SURGERY
Discharge: HOME | End: 2022-04-15
Attending: OTOLARYNGOLOGY | Admitting: OTOLARYNGOLOGY
Payer: COMMERCIAL

## 2022-04-15 ENCOUNTER — ANESTHESIA EVENT (OUTPATIENT)
Dept: OPERATING ROOM | Age: 57
End: 2022-04-15
Payer: COMMERCIAL

## 2022-04-15 ENCOUNTER — ANESTHESIA (OUTPATIENT)
Dept: OPERATING ROOM | Age: 57
End: 2022-04-15
Payer: COMMERCIAL

## 2022-04-15 VITALS — TEMPERATURE: 97.7 F | SYSTOLIC BLOOD PRESSURE: 83 MMHG | DIASTOLIC BLOOD PRESSURE: 53 MMHG | OXYGEN SATURATION: 89 %

## 2022-04-15 VITALS
WEIGHT: 195.2 LBS | HEIGHT: 64 IN | TEMPERATURE: 97.1 F | SYSTOLIC BLOOD PRESSURE: 120 MMHG | BODY MASS INDEX: 33.32 KG/M2 | HEART RATE: 73 BPM | RESPIRATION RATE: 18 BRPM | DIASTOLIC BLOOD PRESSURE: 73 MMHG | OXYGEN SATURATION: 96 %

## 2022-04-15 DIAGNOSIS — J34.89 NASAL OBSTRUCTION: ICD-10-CM

## 2022-04-15 DIAGNOSIS — Z98.890 STATUS POST NASAL SURGERY: Primary | ICD-10-CM

## 2022-04-15 PROCEDURE — 3700000001 HC ADD 15 MINUTES (ANESTHESIA): Performed by: OTOLARYNGOLOGY

## 2022-04-15 PROCEDURE — 6370000000 HC RX 637 (ALT 250 FOR IP)

## 2022-04-15 PROCEDURE — 3700000000 HC ANESTHESIA ATTENDED CARE: Performed by: OTOLARYNGOLOGY

## 2022-04-15 PROCEDURE — 6360000002 HC RX W HCPCS: Performed by: NURSE ANESTHETIST, CERTIFIED REGISTERED

## 2022-04-15 PROCEDURE — 31237 NSL/SINS NDSC SURG BX POLYPC: CPT | Performed by: OTOLARYNGOLOGY

## 2022-04-15 PROCEDURE — 7100000010 HC PHASE II RECOVERY - FIRST 15 MIN: Performed by: OTOLARYNGOLOGY

## 2022-04-15 PROCEDURE — 2580000003 HC RX 258: Performed by: OTOLARYNGOLOGY

## 2022-04-15 PROCEDURE — 2500000003 HC RX 250 WO HCPCS: Performed by: NURSE ANESTHETIST, CERTIFIED REGISTERED

## 2022-04-15 PROCEDURE — 7100000011 HC PHASE II RECOVERY - ADDTL 15 MIN: Performed by: OTOLARYNGOLOGY

## 2022-04-15 PROCEDURE — 7100000000 HC PACU RECOVERY - FIRST 15 MIN: Performed by: OTOLARYNGOLOGY

## 2022-04-15 PROCEDURE — 6360000002 HC RX W HCPCS: Performed by: ANESTHESIOLOGY

## 2022-04-15 PROCEDURE — 7100000001 HC PACU RECOVERY - ADDTL 15 MIN: Performed by: OTOLARYNGOLOGY

## 2022-04-15 PROCEDURE — 2709999900 HC NON-CHARGEABLE SUPPLY: Performed by: OTOLARYNGOLOGY

## 2022-04-15 PROCEDURE — 3600000004 HC SURGERY LEVEL 4 BASE: Performed by: OTOLARYNGOLOGY

## 2022-04-15 PROCEDURE — 6370000000 HC RX 637 (ALT 250 FOR IP): Performed by: OTOLARYNGOLOGY

## 2022-04-15 PROCEDURE — 88305 TISSUE EXAM BY PATHOLOGIST: CPT

## 2022-04-15 PROCEDURE — 2720000010 HC SURG SUPPLY STERILE: Performed by: OTOLARYNGOLOGY

## 2022-04-15 PROCEDURE — 3600000014 HC SURGERY LEVEL 4 ADDTL 15MIN: Performed by: OTOLARYNGOLOGY

## 2022-04-15 RX ORDER — SODIUM CHLORIDE 0.9 % (FLUSH) 0.9 %
5-40 SYRINGE (ML) INJECTION EVERY 12 HOURS SCHEDULED
Status: DISCONTINUED | OUTPATIENT
Start: 2022-04-15 | End: 2022-04-15 | Stop reason: HOSPADM

## 2022-04-15 RX ORDER — SCOLOPAMINE TRANSDERMAL SYSTEM 1 MG/1
1 PATCH, EXTENDED RELEASE TRANSDERMAL ONCE
Status: DISCONTINUED | OUTPATIENT
Start: 2022-04-15 | End: 2022-04-15 | Stop reason: HOSPADM

## 2022-04-15 RX ORDER — ONDANSETRON 2 MG/ML
INJECTION INTRAMUSCULAR; INTRAVENOUS PRN
Status: DISCONTINUED | OUTPATIENT
Start: 2022-04-15 | End: 2022-04-15 | Stop reason: SDUPTHER

## 2022-04-15 RX ORDER — LORAZEPAM 2 MG/ML
0.5 INJECTION INTRAMUSCULAR
Status: DISCONTINUED | OUTPATIENT
Start: 2022-04-15 | End: 2022-04-15 | Stop reason: HOSPADM

## 2022-04-15 RX ORDER — DIPHENHYDRAMINE HYDROCHLORIDE 50 MG/ML
12.5 INJECTION INTRAMUSCULAR; INTRAVENOUS
Status: DISCONTINUED | OUTPATIENT
Start: 2022-04-15 | End: 2022-04-15 | Stop reason: HOSPADM

## 2022-04-15 RX ORDER — FENTANYL CITRATE 50 UG/ML
50 INJECTION, SOLUTION INTRAMUSCULAR; INTRAVENOUS EVERY 5 MIN PRN
Status: DISCONTINUED | OUTPATIENT
Start: 2022-04-15 | End: 2022-04-15 | Stop reason: HOSPADM

## 2022-04-15 RX ORDER — FENTANYL CITRATE 50 UG/ML
INJECTION, SOLUTION INTRAMUSCULAR; INTRAVENOUS PRN
Status: DISCONTINUED | OUTPATIENT
Start: 2022-04-15 | End: 2022-04-15 | Stop reason: SDUPTHER

## 2022-04-15 RX ORDER — SODIUM CHLORIDE 9 MG/ML
25 INJECTION, SOLUTION INTRAVENOUS PRN
Status: DISCONTINUED | OUTPATIENT
Start: 2022-04-15 | End: 2022-04-15 | Stop reason: HOSPADM

## 2022-04-15 RX ORDER — SODIUM CHLORIDE 0.9 % (FLUSH) 0.9 %
5-40 SYRINGE (ML) INJECTION PRN
Status: DISCONTINUED | OUTPATIENT
Start: 2022-04-15 | End: 2022-04-15 | Stop reason: HOSPADM

## 2022-04-15 RX ORDER — SODIUM CHLORIDE 9 MG/ML
INJECTION, SOLUTION INTRAVENOUS CONTINUOUS
Status: DISCONTINUED | OUTPATIENT
Start: 2022-04-15 | End: 2022-04-15 | Stop reason: HOSPADM

## 2022-04-15 RX ORDER — TRAMADOL HYDROCHLORIDE 50 MG/1
50 TABLET ORAL EVERY 6 HOURS PRN
Qty: 12 TABLET | Refills: 0 | Status: SHIPPED | OUTPATIENT
Start: 2022-04-15 | End: 2022-04-18

## 2022-04-15 RX ORDER — LABETALOL 20 MG/4 ML (5 MG/ML) INTRAVENOUS SYRINGE
10
Status: DISCONTINUED | OUTPATIENT
Start: 2022-04-15 | End: 2022-04-15 | Stop reason: HOSPADM

## 2022-04-15 RX ORDER — MIDAZOLAM HYDROCHLORIDE 1 MG/ML
INJECTION INTRAMUSCULAR; INTRAVENOUS PRN
Status: DISCONTINUED | OUTPATIENT
Start: 2022-04-15 | End: 2022-04-15 | Stop reason: SDUPTHER

## 2022-04-15 RX ORDER — PROPOFOL 10 MG/ML
INJECTION, EMULSION INTRAVENOUS PRN
Status: DISCONTINUED | OUTPATIENT
Start: 2022-04-15 | End: 2022-04-15 | Stop reason: SDUPTHER

## 2022-04-15 RX ORDER — DEXAMETHASONE SODIUM PHOSPHATE 10 MG/ML
INJECTION, EMULSION INTRAMUSCULAR; INTRAVENOUS PRN
Status: DISCONTINUED | OUTPATIENT
Start: 2022-04-15 | End: 2022-04-15 | Stop reason: SDUPTHER

## 2022-04-15 RX ORDER — OXYMETAZOLINE HYDROCHLORIDE 0.05 G/100ML
SPRAY NASAL PRN
Status: DISCONTINUED | OUTPATIENT
Start: 2022-04-15 | End: 2022-04-15 | Stop reason: ALTCHOICE

## 2022-04-15 RX ORDER — MEPERIDINE HYDROCHLORIDE 25 MG/ML
12.5 INJECTION INTRAMUSCULAR; INTRAVENOUS; SUBCUTANEOUS EVERY 5 MIN PRN
Status: DISCONTINUED | OUTPATIENT
Start: 2022-04-15 | End: 2022-04-15 | Stop reason: HOSPADM

## 2022-04-15 RX ORDER — ROCURONIUM BROMIDE 10 MG/ML
INJECTION, SOLUTION INTRAVENOUS PRN
Status: DISCONTINUED | OUTPATIENT
Start: 2022-04-15 | End: 2022-04-15 | Stop reason: SDUPTHER

## 2022-04-15 RX ORDER — DROPERIDOL 2.5 MG/ML
0.62 INJECTION, SOLUTION INTRAMUSCULAR; INTRAVENOUS
Status: DISCONTINUED | OUTPATIENT
Start: 2022-04-15 | End: 2022-04-15 | Stop reason: HOSPADM

## 2022-04-15 RX ORDER — CEFAZOLIN SODIUM 1 G/3ML
INJECTION, POWDER, FOR SOLUTION INTRAMUSCULAR; INTRAVENOUS PRN
Status: DISCONTINUED | OUTPATIENT
Start: 2022-04-15 | End: 2022-04-15 | Stop reason: SDUPTHER

## 2022-04-15 RX ORDER — HYDRALAZINE HYDROCHLORIDE 20 MG/ML
10 INJECTION INTRAMUSCULAR; INTRAVENOUS
Status: DISCONTINUED | OUTPATIENT
Start: 2022-04-15 | End: 2022-04-15 | Stop reason: HOSPADM

## 2022-04-15 RX ORDER — GLYCOPYRROLATE 1 MG/5 ML
SYRINGE (ML) INTRAVENOUS PRN
Status: DISCONTINUED | OUTPATIENT
Start: 2022-04-15 | End: 2022-04-15 | Stop reason: SDUPTHER

## 2022-04-15 RX ORDER — SUCCINYLCHOLINE/SOD CL,ISO/PF 200MG/10ML
SYRINGE (ML) INTRAVENOUS PRN
Status: DISCONTINUED | OUTPATIENT
Start: 2022-04-15 | End: 2022-04-15 | Stop reason: SDUPTHER

## 2022-04-15 RX ORDER — NEOSTIGMINE METHYLSULFATE 5 MG/5 ML
SYRINGE (ML) INTRAVENOUS PRN
Status: DISCONTINUED | OUTPATIENT
Start: 2022-04-15 | End: 2022-04-15 | Stop reason: SDUPTHER

## 2022-04-15 RX ORDER — FLUTICASONE PROPIONATE 50 MCG
2 SPRAY, SUSPENSION (ML) NASAL 2 TIMES DAILY
Qty: 16 G | Refills: 5 | Status: SHIPPED | OUTPATIENT
Start: 2022-04-15

## 2022-04-15 RX ORDER — SCOLOPAMINE TRANSDERMAL SYSTEM 1 MG/1
PATCH, EXTENDED RELEASE TRANSDERMAL
Status: DISCONTINUED
Start: 2022-04-15 | End: 2022-04-15 | Stop reason: HOSPADM

## 2022-04-15 RX ORDER — MORPHINE SULFATE 2 MG/ML
2 INJECTION, SOLUTION INTRAMUSCULAR; INTRAVENOUS EVERY 5 MIN PRN
Status: DISCONTINUED | OUTPATIENT
Start: 2022-04-15 | End: 2022-04-15 | Stop reason: HOSPADM

## 2022-04-15 RX ORDER — ONDANSETRON 2 MG/ML
4 INJECTION INTRAMUSCULAR; INTRAVENOUS
Status: DISCONTINUED | OUTPATIENT
Start: 2022-04-15 | End: 2022-04-15 | Stop reason: HOSPADM

## 2022-04-15 RX ORDER — IPRATROPIUM BROMIDE AND ALBUTEROL SULFATE 2.5; .5 MG/3ML; MG/3ML
1 SOLUTION RESPIRATORY (INHALATION)
Status: DISCONTINUED | OUTPATIENT
Start: 2022-04-15 | End: 2022-04-15 | Stop reason: HOSPADM

## 2022-04-15 RX ADMIN — FENTANYL CITRATE 100 MCG: 50 INJECTION, SOLUTION INTRAMUSCULAR; INTRAVENOUS at 13:52

## 2022-04-15 RX ADMIN — DEXAMETHASONE SODIUM PHOSPHATE 8 MG: 10 INJECTION, EMULSION INTRAMUSCULAR; INTRAVENOUS at 14:01

## 2022-04-15 RX ADMIN — FENTANYL CITRATE 50 MCG: 50 INJECTION, SOLUTION INTRAMUSCULAR; INTRAVENOUS at 15:20

## 2022-04-15 RX ADMIN — Medication 100 MG: at 13:53

## 2022-04-15 RX ADMIN — PROPOFOL 150 MG: 10 INJECTION, EMULSION INTRAVENOUS at 13:53

## 2022-04-15 RX ADMIN — Medication 140 MG: at 13:53

## 2022-04-15 RX ADMIN — CEFAZOLIN SODIUM 2000 MG: 1 INJECTION, POWDER, FOR SOLUTION INTRAMUSCULAR; INTRAVENOUS at 14:07

## 2022-04-15 RX ADMIN — ONDANSETRON 4 MG: 2 INJECTION INTRAMUSCULAR; INTRAVENOUS at 14:01

## 2022-04-15 RX ADMIN — ROCURONIUM BROMIDE 20 MG: 50 INJECTION, SOLUTION INTRAVENOUS at 14:02

## 2022-04-15 RX ADMIN — MIDAZOLAM 2 MG: 1 INJECTION INTRAMUSCULAR; INTRAVENOUS at 13:52

## 2022-04-15 RX ADMIN — Medication 0.8 MG: at 14:42

## 2022-04-15 RX ADMIN — Medication 5 MG: at 14:42

## 2022-04-15 RX ADMIN — SODIUM CHLORIDE: 9 INJECTION, SOLUTION INTRAVENOUS at 13:01

## 2022-04-15 ASSESSMENT — PAIN SCALES - GENERAL
PAINLEVEL_OUTOF10: 3
PAINLEVEL_OUTOF10: 7
PAINLEVEL_OUTOF10: 4
PAINLEVEL_OUTOF10: 1
PAINLEVEL_OUTOF10: 3
PAINLEVEL_OUTOF10: 3
PAINLEVEL_OUTOF10: 0
PAINLEVEL_OUTOF10: 0
PAINLEVEL_OUTOF10: 4

## 2022-04-15 ASSESSMENT — PULMONARY FUNCTION TESTS
PIF_VALUE: 24
PIF_VALUE: 20
PIF_VALUE: 19
PIF_VALUE: 2
PIF_VALUE: 20
PIF_VALUE: 19
PIF_VALUE: 29
PIF_VALUE: 20
PIF_VALUE: 1
PIF_VALUE: 20
PIF_VALUE: 19
PIF_VALUE: 19
PIF_VALUE: 20
PIF_VALUE: 19
PIF_VALUE: 19
PIF_VALUE: 9
PIF_VALUE: 20
PIF_VALUE: 19
PIF_VALUE: 19
PIF_VALUE: 15
PIF_VALUE: 20
PIF_VALUE: 16
PIF_VALUE: 19
PIF_VALUE: 17
PIF_VALUE: 17
PIF_VALUE: 4
PIF_VALUE: 2
PIF_VALUE: 20
PIF_VALUE: 1
PIF_VALUE: 19
PIF_VALUE: 1
PIF_VALUE: 10
PIF_VALUE: 19
PIF_VALUE: 13
PIF_VALUE: 19
PIF_VALUE: 3
PIF_VALUE: 20
PIF_VALUE: 19
PIF_VALUE: 19
PIF_VALUE: 20
PIF_VALUE: 20
PIF_VALUE: 1
PIF_VALUE: 14
PIF_VALUE: 20
PIF_VALUE: 3
PIF_VALUE: 19
PIF_VALUE: 20

## 2022-04-15 ASSESSMENT — PAIN - FUNCTIONAL ASSESSMENT: PAIN_FUNCTIONAL_ASSESSMENT: 0-10

## 2022-04-15 NOTE — PROGRESS NOTES
1451 Pt arrives to recovery responsive to verbal stimulation. Pt respirations are even and unlabored on room air. VSS. Pt denies any pain   1506 Pt denies any pain. VSS. Pt resting with eyes closed. Respirations are unlabored   1520 Pt states her pain is now a 7/10. Pt medicated with fentanyl. Pt respirations are unlabored, VSS  1530 Pt states her pain is now a 4/10 and tolerable. Pt respirations unlabored. VSS.  Pt resting with eyes closed   1540 Pt meets criteria for discharge from recovery

## 2022-04-15 NOTE — PROGRESS NOTES
Pt returned to General acute hospital room 9. Vitals and assessment as charted. 0.9 infusing, @450ml to count from PACU. Pt has muffin and water. Family at the bedside. Pt and family verbalized understanding of discharge criteria and call light use. Call light in reach.

## 2022-04-15 NOTE — PROGRESS NOTES
ADMITTED TO [facility] AND ORIENTED TO UNIT. SCDS ON. FALL AND ALLERGY BANDS ON. PT VERBALIZED APPROVAL FOR FIRST NAME, LAST INITIAL AND PHYSICIAN NAME ON UNIT WHITEBOARD. Spouse, Aliciadeenanay Mcdonald with the patient.

## 2022-04-15 NOTE — OP NOTE
Operative Note      Patient: Shelia Mesa  YOB: 1965  MRN: [id number]    Date of Procedure: 4/15/2022    Pre-Op Diagnosis: S/P SINUS SURGERY; nasal synechia with recurring obstruction    Post-Op Diagnosis: Same       Procedure(s):  NASAL SINUS ENDOSCOPY WITH BILAT DEBRIDEMENT    Surgeon(s):  Evette Parson MD    Assistant:   * No surgical staff found *    Anesthesia: General    Estimated Blood Loss (mL): less than 239     Complications: None    Specimens:   ID Type Source Tests Collected by Time Destination   A : RIGHT INFERIOR TURBINATE SYNECHIAE Tissue Sinus SURGICAL PATHOLOGY Evette Parson MD 4/15/2022 1419        Implants:  * No implants in log *      Drains: * No LDAs found *    Findings: 1. The patient's right inferior turbinate was fused along its entire length to the nasal septum; I  it with a 3 biting double-action forceps to make a 2 to 3 mm gap between the 2 structures which I used a debrider to widen. 2.  The middle turbinate was fused to the lateral nasal wall along the anterior and anterior inferior aspects but not fully fused inferiorly or posteriorly. Detailed Description of Procedure: The patient was taken to the operating room awake and placed in supine position. General anesthesia was induced the patient was intubated with a 7.0 endotracheal tube without difficulty. The table was turned 90 degrees for the procedure. Patient was prepped and draped in the usual fashion for an aseptic approach to the nasal airway. I performed a timeout verifying the patient's identity and planned procedure. Nasal airway endoscopy with debridement: With a 30 degree optical telescope I carefully examined the patient's right nasal airway and found it to be abnormal for the presence of a full-length synechia between the inferior turbinate and the nasal septum. In addition the middle turbinate was partially fused to the lateral wall.   Upper extremity attention to the inferior turbinate. Using double-action through biting widemouth forceps, I removed a 2 to 3 mm block of tissue between the inferior turbinate and septum to produce a gap between the 2 structures that would be less apt to reform. I then used a straight debrider powered device to smooth down the surfaces of both sides and make the soft tissue on the septal side relatively concave. I used topical Afrin and a judicious use of suction cautery to achieve hemostasis. Than to my attention to the middle turbinate. I used a freer elevator to separate the relatively soft binding of the middle turbinate to the lateral wall and pushed it medially towards the septum. I then removed the residual of the frontal recess splint with forceps and the debrider blade in addition removing a small thickness of the middle turbinate along its lateral aspect to reduce the extent to which the middle turbinate would have about the lateral wall. Some mucopurulence draining down from the frontal recess. I looked into the maxillary sinus and saw no purulence in that region either. I then cleansed the nose with a generous amount of sterile saline to determine if adequate hemostasis had been achieved. It had. As such I suctioned out the patient's oropharynx and turned the patient back to anesthesia for reversal and extubation in the operating room. This was carried out without incident and the patient was taken to recovery in satisfactory condition. Is estimated blood loss was approximately 100 cc the patient received less than a liter of intravenous lactated Ringer's intraoperatively. No blood pressure transfused. No intraoperative complications were detected. Counts were considered accurate x3. I was present for and performed the patient's entire operation myself.     Electronically signed by Hannah Grubbs MD on 4/15/2022 at 3:05 PM

## 2022-04-15 NOTE — ANESTHESIA PRE PROCEDURE
Department of Anesthesiology  Preprocedure Note       Name:  Indu Perez   Age:  56 y.o.  :  1965                                          MRN:  [id number]         Date:  4/15/2022      Surgeon: Mami Tariq):  Betty Young MD    Procedure: Procedure(s):  NASAL SINUS ENDOSCOPY WITH BILAT DEBRIDEMENT    Medications prior to admission:   Prior to Admission medications    Medication Sig Start Date End Date Taking? Authorizing Provider   levothyroxine (SYNTHROID) 175 MCG tablet Take 200 mcg by mouth daily 200 mg mon thru sat 100 mcg on akanksha 11/6/15   Historical Provider, MD       Current medications:    Current Facility-Administered Medications   Medication Dose Route Frequency Provider Last Rate Last Admin    0.9 % sodium chloride infusion   IntraVENous Continuous Betty Young  mL/hr at 04/15/22 1301 New Bag at 04/15/22 1301    scopolamine (TRANSDERM-SCOP) transdermal patch 1 patch  1 patch TransDERmal Once Kalyan Hussein MD           Allergies: Allergies   Allergen Reactions    Tape [place] Tape] Dermatitis     bandaids       Problem List:    Patient Active Problem List   Diagnosis Code    Post-surgical hypothyroidism E89.0    Papillary carcinoma of thyroid ([facility] [address] C73    Witnessed apneic spells R06.81    Snoring R06.83    Obesity (BMI 30-39. 9) E66.9    Fatigue R53.83    Non-restorative sleep G47.8    Bruxism F45.8    Morning headache R51.9    Immune disorder (HCC) D89.9    Claustrophobia F40.240    Gastroesophageal reflux disease K21.9    Hypercholesteremia E78.00    Non morbid obesity due to excess calories E66.09    Fibroids D21.9    Maxillary polyp of sinus J33.8    Recurrent sinus infections J32.9    Nasal obstruction J34.89    Obstructive sleep apnea G47.33    Antrochoanal polyp J33.0    Chronic ethmoidal sinusitis J32.2    Hypertrophy of inferior nasal turbinate J34.3    S/P sinus surgery Z98.890    Deviated nasal septum J34.2    Graves' ophthalmopathy E05.00    History of total thyroidectomy E89.0    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 Marilu ESPINAL (postoperative nausea and vomiting)        Past Surgical History:        Procedure Laterality Date    CHOLECYSTECTOMY      DILATION AND CURETTAGE OF UTERUS      EAR SURGERY Right 12/29/2017    MOHS REPAIR BCC OF RIGHT ALA and biopsy of left cheek x2 and right calf x1 performed by Cristi Alfaro MD at [place] [address]  05/04/2015    [address] [place] [address] Right 12/29/2017    MOHS Repair BCC of Right Ala     PRE-MALIGNANT / BENIGN SKIN LESION EXCISION  4/18/2014    forehead    SINUS ENDOSCOPY N/A 2/4/2022    IMAGE GUIDED SURGERY: RIGHT MAXILLARY ANTROSTOMY WITH REMOVAL OF TISSUE FROM MAXILLARY SINUS; EXCISION OF MAXILLARY POLYPS OF SINUS, ANTERIOR ETHMOID ECTOMY performed by Mehran Gonzalez MD at [address] [place] [address] N/A 3/11/2022    NASAL ENDOSCOPY WITH DEBRIDEMENT (TAKEDOWN OF MULTIPLE NASAL SYNCHIA AND RESECTION OF NASAL POLYPOSIS, LIMITED ANTERIOR ETHMOIDECTOMY USING IGS, SPLIT PLACEMENT performed by Mehran Gonzalez MD at [facility]  12/29/2017    Biopsy of Face x2 and Right calf x1    SUTURE REMOVAL Right 4/18/2014    Forearm, retained sutures    MARI AND BSO N/A 1/4/2022    HYSTERECTOMY ABDOMINAL TOTAL BILATERAL SALPINGO-OOPHORECTOMY performed by Rianna Rodriguez DO at [facility] Bilateral 05/04/2015    Dr. Marilu Troy- total with modified right radical neck dissection    TONSILLECTOMY         Social History:    Social History     Tobacco Use    Smoking status: Former Smoker    Smokeless tobacco: Never Used   Substance Use Topics    Alcohol use:  Yes     Alcohol/week: 0.0 standard drinks     Comment: occasionally                                 Counseling given: Not Answered      Vital Signs (Current):   Vitals:    04/07/22 1106 04/15/22 1224   BP:  (!) 143/82   Pulse:  76   Resp:  20   Temp:  97.6 °F (36.4 °C)   TempSrc:  Temporal   SpO2:  100%   Weight: 193 lb (87.5 kg) 195 lb 3.2 oz (88.5 kg)   Height:  5' 4\" (1.626 m)                                              BP Readings from Last 3 Encounters:   04/15/22 (!) 143/82   04/11/22 122/84   03/22/22 118/80       NPO Status: Time of last liquid consumption: 2230                        Time of last solid consumption: 2230                        Date of last liquid consumption: 04/14/22                        Date of last solid food consumption: 04/14/22    BMI:   Wt Readings from Last 3 Encounters:   04/15/22 195 lb 3.2 oz (88.5 kg)   04/11/22 197 lb (89.4 kg)   03/22/22 195 lb (88.5 kg)     Body mass index is 33.51 kg/m². CBC:   Lab Results   Component Value Date    WBC 7.5 03/11/2022    RBC 4.55 03/11/2022    RBC 4.62 10/14/2021    HGB 11.2 03/11/2022    HCT 36.3 03/11/2022    MCV 79.8 03/11/2022    RDW 16.9 10/14/2021     03/11/2022       CMP:   Lab Results   Component Value Date     03/11/2022    K 4.0 03/11/2022     03/11/2022    CO2 21 03/11/2022    BUN 14 03/11/2022    CREATININE 0.5 03/11/2022    LABGLOM >90 03/11/2022    GLUCOSE 107 03/11/2022    GLUCOSE 100 10/14/2021    PROT 6.4 03/11/2022    CALCIUM 8.0 03/11/2022    BILITOT 0.2 03/11/2022    ALKPHOS 58 03/11/2022    AST 13 03/11/2022    ALT 10 03/11/2022       POC Tests: No results for input(s): POCGLU, POCNA, POCK, POCCL, POCBUN, POCHEMO, POCHCT in the last 72 hours.     Coags:   Lab Results   Component Value Date    INR 1.05 03/11/2022       HCG (If Applicable):   Lab Results   Component Value Date    PREGTESTUR negative 01/04/2022        ABGs: No results found for: PHART, PO2ART, UKE0DSI, SME8SSS, BEART, [id number]     Type & Screen (If Applicable):  Lab Results   Component Value Date    LABRH POS 01/04/2022       Drug/Infectious Status (If Applicable):  No results found for: HIV, HEPCAB    COVID-19 Screening (If Applicable): No results found for: COVID19        Anesthesia Evaluation     history of anesthetic complications: PONV. Airway: Mallampati: II        Dental:          Pulmonary:   (+) sleep apnea:                             Cardiovascular:                      Neuro/Psych:   (+) neuromuscular disease:, headaches:,             GI/Hepatic/Renal:   (+) GERD:,           Endo/Other:    (+) hypothyroidism::., .                 Abdominal:             Vascular: Other Findings:             Anesthesia Plan      general     ASA 2       Induction: intravenous. Anesthetic plan and risks discussed with patient.                       Joaquin Villatoro MD   4/15/2022

## 2022-04-15 NOTE — PROGRESS NOTES
Pt has met discharge criteria and states she is ready for discharge to home. IV removed, gauze and tape applied. Dressed in own clothes and personal belongings gathered. Discharge instructions (with opioid medication education information) given to pt and family; pt and family verbalized understanding of discharge instructions, prescriptions and follow up appointments. Pt sent home with afrin and drip pads. Pt transported to discharge lobby by [facility] staff.

## 2022-04-15 NOTE — H&P
The patient returns to the operating room for a subsequent treatment of her apparent scarring and granulation tissue well above what is typical following endonasal polypectomy and turbinectomy. The attached note is of her recent office visit with additional information which is still current. I reviewed our plans with the patient and her  in the holding area to their satisfaction. They report being on board with proceeding as planned with the hopes of maintaining her nasal airway patency. Her description of recent irrigations is that the been mostly \"clear\". Man Sheikh. MD Lyndsey Crooks MD   Physician   Specialty:  Otolaryngology   Progress Notes      Signed   Encounter Date:  4/11/2022                 Signed        Expand All Collapse All        Show:Clear all  [x]Manual[x]Template[]Copied    Added by:  [x]Stanley Guthrie MD      []Namita for details        [facility]  Nancy Vazquez [address] [place] [address]  Dept: [phone]  Dept Fax: [phone]  Loc: [phone]     Martínez Diaz is a 56 y.o. female who was referred by No ref. provider found for:       Chief Complaint   Patient presents with    Post-Op Check       pt is here for post op         HPI:      Martínez Diaz is a 56 y.o. female who reports a dramatic improvement in her overall nasal comfort both in her ability to breathe through both sides of her nose and in the degree of irritation and inflammation that she has been experiencing. In addition she reports the degree of irritation in her eyes, having exophthalmos related to Graves' disease, despite the fact that nothing has been done to specifically treat her Graves' ophthalmopathy since that was noticed as an issue early in her care with me.   She denies any more epistaxis and reports that she is breathing comfortably and without even snoring according to her  except one time in the recent past when she had some alcohol before going to bed. In essence she is very pleased that she seems to be improving on all fronts at least for the time being.                 History:            Allergies   Allergen Reactions    Tape [Adhesive Tape] Dermatitis       bandaids      Current Facility-Administered Medications          Current Outpatient Medications   Medication Sig Dispense Refill    levothyroxine (SYNTHROID) 175 MCG tablet Take 200 mcg by mouth daily 200 mg mon thru sat 100 mcg on sunday          No current facility-administered medications for this visit.         Past Medical History        Past Medical History:   Diagnosis Date    BCC (basal cell carcinoma of skin)      Papillary carcinoma of thyroid (HCC)       5/4/2015- Dr. rSee Laurent    PONV (postoperative nausea and vomiting)           Past Surgical History         Past Surgical History:   Procedure Laterality Date    CHOLECYSTECTOMY        DILATION AND CURETTAGE OF UTERUS        EAR SURGERY Right 12/29/2017     MOHS REPAIR BCC OF RIGHT ALA and biopsy of left cheek x2 and right calf x1 performed by Justice Araiza MD at [facility]   05/04/2015     65    MOHS SURGERY        MOHS SURGERY Right 12/29/2017     MOHS Repair BCC of Right Ala     PRE-MALIGNANT / BENIGN SKIN LESION EXCISION   4/18/2014     forehead    SINUS ENDOSCOPY N/A 2/4/2022     IMAGE GUIDED SURGERY: RIGHT MAXILLARY ANTROSTOMY WITH REMOVAL OF TISSUE FROM MAXILLARY SINUS; EXCISION OF MAXILLARY POLYPS OF SINUS, ANTERIOR ETHMOID ECTOMY performed by Rafael Colon MD at [facility] N/A 3/11/2022     NASAL ENDOSCOPY WITH DEBRIDEMENT (TAKEDOWN OF MULTIPLE NASAL SYNCHIA AND RESECTION OF NASAL POLYPOSIS, LIMITED ANTERIOR ETHMOIDECTOMY USING IGS, SPLIT PLACEMENT performed by Rafael Colon MD at [address]   12/29/2017     Biopsy of Face x2 and Right calf x1    SUTURE REMOVAL Right 4/18/2014     Forearm, retained sutures    MARI AND BSO N/A 1/4/2022     HYSTERECTOMY ABDOMINAL TOTAL BILATERAL SALPINGO-OOPHORECTOMY performed by Paulo Lemons DO at [facility] Bilateral 05/04/2015     Dr. Echeverria Graft- total with modified right radical neck dissection    TONSILLECTOMY             Family History         Family History   Problem Relation Age of Onset    Breast Cancer Paternal Grandmother           Social History            Tobacco Use    Smoking status: Former Smoker    Smokeless tobacco: Never Used   Substance Use Topics    Alcohol use: Yes       Alcohol/week: 0.0 standard drinks       Comment: occasionally                     Subjective:      Review of Systems  Rest of review of systems are negative, except as noted in HPI.      Objective:      /84 (Site: Left Upper Arm, Position: Sitting)   Pulse 84   Temp 97.1 °F (36.2 °C)   Resp 16   Wt 197 lb (89.4 kg)   LMP 10/01/2021   SpO2 97%   BMI 33.81 kg/m²      Physical Exam         On general physical exam the patient is a pleasant alert and cooperative middle-aged female in no acute distress. Her voice and her speech pattern are within normal limits for age and gender. I heard no throat clearing coughing or inspiratory stridor. She was neither hyper nor hyponasal.       The patient's eyes were noted to be proptotic and only had mild conjunctivitis. There was no excess tearing or crusting on either side. Her extraocular movements appear to be conjugate.     On anterior inspection the patient had no signs of recent bleeding and was moving air well through both nostrils. She had mild nasal valving. Her oropharynx was abnormal for the presence of a high tongue base and a low soft palate but I could see all the way across the soft palate with just gentle pressure on the tongue base. I saw no mucopurulence draining in the nasopharynx. I saw no signs of recent bleeding.   I saw no soft tissue protruding from the nasopharynx.     Vitals reviewed.     CT FACIAL BONES WO CONTRAST     Result Date: 4/5/2022   1. Moderate severity mucosal thickening in both maxillary sinuses. 2. Mucosal thickening in the ethmoid air cells bilaterally. This is worse on the right than the left. 3. Mucosal thickening in the right frontal sinus. **This report has been created using voice recognition software. It may contain minor errors which are inherent in voice recognition technology. ** Final report electronically signed by Dr. Merlin Bravo on 4/5/2022 9:21 AM           Lab Results   Component Value Date      03/11/2022      10/14/2021      12/22/2017     K 4.0 03/11/2022     K 4.1 10/14/2021     K 4.1 12/22/2017      03/11/2022      10/14/2021      12/22/2017     CO2 21 03/11/2022     CO2 25 10/14/2021     CO2 26 12/22/2017     BUN 14 03/11/2022     BUN 12 10/14/2021     BUN 13 12/22/2017     CREATININE 0.5 03/11/2022     CREATININE 0.61 10/14/2021     CREATININE 0.6 12/22/2017     CALCIUM 8.0 03/11/2022     CALCIUM 8.0 10/14/2021     CALCIUM 8.3 12/22/2017     PROT 6.4 03/11/2022     PROT 6.3 10/14/2021     PROT 6.6 09/29/2017     LABALBU 3.9 03/11/2022     LABALBU 3.9 10/14/2021     LABALBU 4.2 09/29/2017     BILITOT 0.2 03/11/2022     BILITOT 0.4 10/14/2021     BILITOT 0.2 09/29/2017     ALKPHOS 58 03/11/2022     ALKPHOS 45 10/14/2021     ALKPHOS 54 09/29/2017     ALKPHOS 48 09/29/2016     AST 13 03/11/2022     AST 13 10/14/2021     AST 10 09/29/2017     ALT 10 03/11/2022     ALT 10 10/14/2021     ALT 8 09/29/2017         All of the past medical history, past surgical history, family history,social history, allergies and current medications were reviewed with the patient. Assessment & Plan   Diagnoses and all orders for this visit:       Diagnosis Orders   1. S/P sinus surgery      2. Hypertrophy of inferior nasal turbinate      3. Nasal obstruction      4.  Exophthalmos      5. Graves' ophthalmopathy    6. History of total thyroidectomy      7. Antrochoanal polyp      8. Recurrent sinus infections      9. Maxillary polyp of sinus            Based on the patient's history and her presentation today as well as my review of her CT scan which was conducted last week, I believe the patient has made dramatic improvement in the whole spectrum of problems that were part of her condition, some of which were very serious and very concerning. As such I recommended that we proceed as planned with the debridement for this coming Friday in order to make sure that her airway patency is being sustained and that no new pathology has emerged within the nasal vault. At that same time I will likely remove her frontal recess middle meatus stent on the right side and take new cultures if I find mucopurulence within the nasal vault.     I explained all this in detail to the patient and her  to their satisfaction. They reported being pleased with the outcome of the visit as well as the outcome of her care thus far and be willing to proceed as such.     I spent over 30 minutes of face-to-face time with the patient the majority of which was dedicated to reviewing her complex history and planning this care program.     Return in about 4 weeks (around 5/9/2022).          **This report has been created using voice recognition software. It may contain minor errors which are inherent in voice recognition technology. **                                               Office Visit on 4/11/2022           Office Visit on 4/11/2022                Detailed Report            Note shared with patient        Progress Notes Info    Author Note Status Last Update User Last Update Date/Time   Katy Munguia MD Signed Katy Munguia MD 4/11/2022  5:18 PM     Chart Review Routing History    No routing history on file.

## 2022-04-22 RX ORDER — AMOXICILLIN AND CLAVULANATE POTASSIUM 875; 125 MG/1; MG/1
1 TABLET, FILM COATED ORAL 2 TIMES DAILY
Qty: 20 TABLET | Refills: 0 | Status: SHIPPED | OUTPATIENT
Start: 2022-04-22 | End: 2022-05-02

## 2022-11-14 ENCOUNTER — OFFICE VISIT (OUTPATIENT)
Dept: FAMILY MEDICINE CLINIC | Age: 57
End: 2022-11-14
Payer: COMMERCIAL

## 2022-11-14 VITALS
HEART RATE: 76 BPM | DIASTOLIC BLOOD PRESSURE: 74 MMHG | BODY MASS INDEX: 36.97 KG/M2 | RESPIRATION RATE: 16 BRPM | TEMPERATURE: 98.1 F | WEIGHT: 215.4 LBS | SYSTOLIC BLOOD PRESSURE: 110 MMHG

## 2022-11-14 DIAGNOSIS — E78.00 HYPERCHOLESTEREMIA: ICD-10-CM

## 2022-11-14 DIAGNOSIS — R53.83 OTHER FATIGUE: ICD-10-CM

## 2022-11-14 DIAGNOSIS — Z23 NEED FOR SHINGLES VACCINE: ICD-10-CM

## 2022-11-14 DIAGNOSIS — E66.9 OBESITY (BMI 30-39.9): ICD-10-CM

## 2022-11-14 DIAGNOSIS — R63.2 BINGE EATING: ICD-10-CM

## 2022-11-14 DIAGNOSIS — Z00.00 WELLNESS EXAMINATION: ICD-10-CM

## 2022-11-14 DIAGNOSIS — Z23 NEED FOR INFLUENZA VACCINATION: ICD-10-CM

## 2022-11-14 DIAGNOSIS — Z00.00 ENCOUNTER FOR WELL ADULT EXAM WITHOUT ABNORMAL FINDINGS: Primary | ICD-10-CM

## 2022-11-14 PROCEDURE — 90674 CCIIV4 VAC NO PRSV 0.5 ML IM: CPT | Performed by: NURSE PRACTITIONER

## 2022-11-14 PROCEDURE — 99396 PREV VISIT EST AGE 40-64: CPT | Performed by: NURSE PRACTITIONER

## 2022-11-14 PROCEDURE — 90471 IMMUNIZATION ADMIN: CPT | Performed by: NURSE PRACTITIONER

## 2022-11-14 RX ORDER — FLUOXETINE HYDROCHLORIDE 20 MG/1
20 CAPSULE ORAL DAILY
Qty: 30 CAPSULE | Refills: 0 | Status: SHIPPED | OUTPATIENT
Start: 2022-11-14

## 2022-11-14 RX ORDER — MELOXICAM 15 MG/1
15 TABLET ORAL DAILY
COMMUNITY
Start: 2022-10-26 | End: 2022-11-14 | Stop reason: ALTCHOICE

## 2022-11-14 RX ORDER — ZOSTER VACCINE RECOMBINANT, ADJUVANTED 50 MCG/0.5
0.5 KIT INTRAMUSCULAR SEE ADMIN INSTRUCTIONS
Qty: 0.5 ML | Refills: 0 | Status: SHIPPED | OUTPATIENT
Start: 2022-11-14 | End: 2023-05-13

## 2022-11-14 SDOH — ECONOMIC STABILITY: FOOD INSECURITY: WITHIN THE PAST 12 MONTHS, THE FOOD YOU BOUGHT JUST DIDN'T LAST AND YOU DIDN'T HAVE MONEY TO GET MORE.: PATIENT DECLINED

## 2022-11-14 SDOH — ECONOMIC STABILITY: FOOD INSECURITY: WITHIN THE PAST 12 MONTHS, YOU WORRIED THAT YOUR FOOD WOULD RUN OUT BEFORE YOU GOT MONEY TO BUY MORE.: PATIENT DECLINED

## 2022-11-14 ASSESSMENT — ENCOUNTER SYMPTOMS
EYE REDNESS: 0
ABDOMINAL DISTENTION: 0
DIARRHEA: 0
SHORTNESS OF BREATH: 0
BLOOD IN STOOL: 0
SORE THROAT: 0
ANAL BLEEDING: 0
NAUSEA: 0
CONSTIPATION: 0
RHINORRHEA: 0
EYE DISCHARGE: 0
COLOR CHANGE: 0
ABDOMINAL PAIN: 0
COUGH: 0

## 2022-11-14 ASSESSMENT — PATIENT HEALTH QUESTIONNAIRE - PHQ9
2. FEELING DOWN, DEPRESSED OR HOPELESS: 0
1. LITTLE INTEREST OR PLEASURE IN DOING THINGS: 0
SUM OF ALL RESPONSES TO PHQ9 QUESTIONS 1 & 2: 0
SUM OF ALL RESPONSES TO PHQ QUESTIONS 1-9: 0

## 2022-11-14 ASSESSMENT — SOCIAL DETERMINANTS OF HEALTH (SDOH): HOW HARD IS IT FOR YOU TO PAY FOR THE VERY BASICS LIKE FOOD, HOUSING, MEDICAL CARE, AND HEATING?: PATIENT DECLINED

## 2022-11-14 NOTE — PATIENT INSTRUCTIONS
Will get some labs  Will try Prozac once daily in the morning    Will give the flu shot today    Back in 4 weeks           Well Visit, Ages 25 to 72: Care Instructions  Well visits can help you stay healthy. Your doctor has checked your overall health and may have suggested ways to take good care of yourself. Your doctor also may have recommended tests. You can help prevent illness with healthy eating, good sleep, vaccinations, regular exercise, and other steps. Get the tests that you and your doctor decide on. Depending on your age and risks, examples might include screening for diabetes; hepatitis C; HIV; and cervical, breast, lung, and colon cancer. Screening helps find diseases before any symptoms appear. Eat healthy foods. Choose fruits, vegetables, whole grains, lean protein, and low-fat dairy foods. Limit saturated fat and reduce salt. Limit alcohol. Men should have no more than 2 drinks a day. Women should have no more than 1. For some people, no alcohol is the best choice. Exercise. Get at least 30 minutes of exercise on most days of the week. Walking can be a good choice. Reach and stay at your healthy weight. This will lower your risk for many health problems. Take care of your mental health. Try to stay connected with friends, family, and community, and find ways to manage stress. If you're feeling depressed or hopeless, talk to someone. A counselor can help. If you don't have a counselor, talk to your doctor. Talk to your doctor if you think you may have a problem with alcohol or drug use. This includes prescription medicines and illegal drugs. Avoid tobacco and nicotine: Don't smoke, vape, or chew. If you need help quitting, talk to your doctor. Practice safer sex. Getting tested, using condoms or dental dams, and limiting sex partners can help prevent STIs. Use birth control if it's important to you to prevent pregnancy.  Talk with your doctor about your choices and what might be best for you.  Prevent problems where you can. Protect your skin from too much sun, wash your hands, brush your teeth twice a day, and wear a seat belt in the car. Where can you learn more? Go to https://Sezionrosenda.Evostor. org and sign in to your [employer] account. Enter P072 in the [facility] box to learn more about \"Well Visit, Ages 25 to 72: Care Instructions. \"     If you do not have an account, please click on the \"Sign Up Now\" link. Current as of: March 9, 2022               Content Version: 13.4  © 1200-1566 [employer]wise[employer]. Care instructions adapted under license by [facility] ([facility]). If you have questions about a medical condition or this instruction, always ask your healthcare professional. [address] any warranty or liability for your use of this information.

## 2022-11-14 NOTE — PROGRESS NOTES
Immunization(s) given during visit:    Immunizations Administered       Name Date Dose Route    Influenza, FLUCELVAX, (age 10 mo+), MDCK, PF, 0.5mL 11/14/2022 0.5 mL Intramuscular    Site: Deltoid- Left    Lot: [id number]    NDC: 54046-220-26

## 2022-11-14 NOTE — PROGRESS NOTES
Well Adult Note  Name: Samia Drake Date: 2022   MRN: [id number] Sex: Female   Age: 56 y.o. Ethnicity: Non- / Non    : 1965 Race: White (non-)      Kaushik Perry is here for well adult exam.  History:    Wellness visit:  The patient has no complaints today. Patient eats 3 meals per day and 3 snacks per day. She does exercise regularly:   Physical activities include: walking, at home program, 3 times per week, 20 minutes/day. She does not take over the counter vitamins or supplements. The patient has ever had a blood transfusion or tattooed?: no. She wears seatbelts while riding a car. She does not text or talk on the phone while driving. She performs all of her ADL's without problem. She is independent, she cooks, drives, bathes, and gets dressed without assistance. She is . She has 3 children. She does work. She works 60 hours a week. She is not current on Influenza. She is not a smoker. Patient does not consume alcoholic beverages on a regular basis - 1-2 glasses of wine about 4 days per week. Patient has had a colonoscopy - . Had hyster 2022 - still has ovaries. She is sexually active. She has had 1 partner(s) in the last 27 year. She does perform regular breast self exams. She  reports that she has quit smoking. She has never used smokeless tobacco. She reports current alcohol use. She reports that she does not use drugs. . Her last mammogram was 1 years, it wasnormal.  Her last pap smear was 1  years - going back January - [facility]. She has not had a Bone Density. Concerned about her weight - heavier than she has even been - done weight watchers, SlimFast, done NOOM, signed up for a video consult for weight loss - lost 2 pounds and then gained it back. 10/2021 - weight was 211, today it is 215 lbs.      2021 - 2022 weight went from 211 - 195 lb 14.4oz - SlimFast, Weight Watchers, and Noon.     Feels like she is holding her weight in her midsection. Review of Systems   Constitutional:  Negative for chills, fatigue and fever. HENT:  Negative for congestion, ear pain, postnasal drip, rhinorrhea and sore throat. Eyes:  Negative for discharge and redness. Respiratory:  Negative for cough and shortness of breath. Cardiovascular:  Negative for chest pain and leg swelling. Gastrointestinal:  Negative for abdominal distention, abdominal pain, anal bleeding, blood in stool, constipation, diarrhea and nausea. Skin:  Negative for color change and rash. Neurological:  Negative for facial asymmetry, speech difficulty and weakness. Hematological:  Does not bruise/bleed easily. Psychiatric/Behavioral:  Negative for agitation and confusion. Allergies   Allergen Reactions    Tape [Adhesive Tape] Dermatitis     bandaids         Prior to Visit Medications    Medication Sig Taking?  Authorizing Provider   zoster recombinant adjuvanted vaccine [facility]) 50 MCG/0.5ML SUSR injection Inject 0.5 mLs into the muscle See Admin Instructions 1 dose now and repeat in 2-6 months Yes ZAMZAM Valera CNP   FLUoxetine (PROZAC) 20 MG capsule Take 1 capsule by mouth daily Yes ZAMZAM Valera CNP   fluticasone (FLONASE) 50 MCG/ACT nasal spray 2 sprays by Each Nostril route 2 times daily Yes Mandi Hsieh MD   levothyroxine (SYNTHROID) 175 MCG tablet Take 200 mcg by mouth daily 200 mg mon thru sat 100 mcg on sunday Yes Historical Provider, MD         Past Medical History:   Diagnosis Date    BCC (basal cell carcinoma of skin)     Papillary carcinoma of thyroid ([facility] [address]     5/4/2015- Dr. Latisha Ruiz    PONV (postoperative nausea and vomiting)        Past Surgical History:   Procedure Laterality Date    CHOLECYSTECTOMY      DILATION AND CURETTAGE OF UTERUS      EAR SURGERY Right 12/29/2017    MOHS REPAIR BCC OF RIGHT ALA and biopsy of left cheek x2 and right calf x1 performed by Wily Garcia MD at [address] (CERVIX STATUS UNKNOWN)      LYMPHADENECTOMY  05/04/2015    65    MOHS SURGERY      MOHS SURGERY Right 12/29/2017    MOHS Repair BCC of Right Ala     PRE-MALIGNANT / BENIGN SKIN LESION EXCISION  4/18/2014    forehead    SINUS ENDOSCOPY N/A 2/4/2022    IMAGE GUIDED SURGERY: RIGHT MAXILLARY ANTROSTOMY WITH REMOVAL OF TISSUE FROM MAXILLARY SINUS; EXCISION OF MAXILLARY POLYPS OF SINUS, ANTERIOR ETHMOID ECTOMY performed by Elliott Heart MD at [address] N/A 3/11/2022    NASAL ENDOSCOPY WITH DEBRIDEMENT (TAKEDOWN OF MULTIPLE NASAL SYNCHIA AND RESECTION OF NASAL POLYPOSIS, LIMITED ANTERIOR ETHMOIDECTOMY USING IGS, SPLIT PLACEMENT performed by Elliott Heart MD at [address]  Bilateral 4/15/2022    NASAL SINUS ENDOSCOPY WITH BILAT DEBRIDEMENT performed by Elliott Heart MD at [address] [facility]  12/29/2017    Biopsy of Face x2 and Right calf x1    SUTURE REMOVAL Right 4/18/2014    Forearm, retained sutures    MARI AND BSO (CERVIX REMOVED) N/A 1/4/2022    HYSTERECTOMY ABDOMINAL TOTAL BILATERAL SALPINGO-OOPHORECTOMY performed by Lana Sanchez DO at [address] [facility] Bilateral 05/04/2015    Dr. Ana Mac- total with modified right radical neck dissection    TONSILLECTOMY           Family History   Problem Relation Age of Onset    Breast Cancer Paternal Grandmother        Social History     Tobacco Use    Smoking status: Former    Smokeless tobacco: Never   Vaping Use    Vaping Use: Never used   Substance Use Topics    Alcohol use:  Yes     Alcohol/week: 0.0 standard drinks     Comment: occasionally     Drug use: No       Objective   /74 (Site: Right Upper Arm)   Pulse 76   Temp 98.1 °F (36.7 °C) (Oral)   Resp 16   Wt 215 lb 6.4 oz (97.7 kg)   LMP 10/01/2021   BMI 36.97 kg/m²   Wt Readings from Last 3 Encounters:   11/14/22 215 lb 6.4 oz (97.7 kg)   04/15/22 195 lb 3.2 oz (88.5 kg)   04/11/22 197 lb (89.4 kg)     There were no vitals filed for this visit. Physical Exam  Constitutional:       General: She is not in acute distress. Appearance: Normal appearance. She is well-developed. She is not ill-appearing or diaphoretic. HENT:      Head: Normocephalic and atraumatic. Right Ear: Hearing and external ear normal. No decreased hearing noted. Left Ear: Hearing and external ear normal. No decreased hearing noted. Nose: Nose normal. No nasal deformity. Eyes:      General:         Right eye: No discharge. Left eye: No discharge. Conjunctiva/sclera: Conjunctivae normal.   Pulmonary:      Effort: Pulmonary effort is normal. No respiratory distress. Abdominal:      General: There is no distension. Tenderness: There is no guarding. Musculoskeletal:         General: No tenderness or deformity. Normal range of motion. Cervical back: Normal range of motion and neck supple. Skin:     Coloration: Skin is not pale. Findings: No erythema or rash (On exposed areas). Neurological:      General: No focal deficit present. Mental Status: She is alert and oriented to person, place, and time. Gait: Gait normal.   Psychiatric:         Mood and Affect: Mood normal.         Speech: Speech normal.         Behavior: Behavior normal.         Thought Content: Thought content normal.         Judgment: Judgment normal.         Assessment   Plan      Zacarias Olson was seen today for annual exam.    Diagnoses and all orders for this visit:    Wellness examination  -     Comprehensive Metabolic Panel; Future  -     CBC with Auto Differential; Future  -     Hepatic Function Panel; Future  -     Lipid Panel; Future  -     TSH with Reflex; Future  -     Vitamin D 25 Hydroxy; Future  -     Iron and TIBC;  Future    Need for influenza vaccination  -     Influenza, FLUCELVAX, (age 10 mo+), IM, Preservative Free, 0.5 mL    Need for shingles vaccine  -     zoster recombinant adjuvanted vaccine [facility]) 50 MCG/0.5ML SUSR injection; Inject 0.5 mLs into the muscle See Admin Instructions 1 dose now and repeat in 2-6 months    Hypercholesteremia  -     Lipid Panel; Future    Obesity (BMI 30-39.9)  -     TSH with Reflex; Future    Other fatigue  -     TSH with Reflex; Future  -     Vitamin D 25 Hydroxy; Future  -     T4; Future  -     Hemoglobin A1C; Future    Binge eating  -     FLUoxetine (PROZAC) 20 MG capsule; Take 1 capsule by mouth daily          Personalized Preventive Plan   Current Health Maintenance Status  Immunization History   Administered Date(s) Administered    COVID-19, PFIZER PURPLE top, DILUTE for use, (age 15 y+), 30mcg/0.3mL 03/27/2021, 04/17/2021, 12/23/2021    Tdap (Boostrix, Adacel) 08/28/2017        Health Maintenance   Topic Date Due    Shingles vaccine (1 of 2) Never done    COVID-19 Vaccine (4 - Booster for Pfizer series) 02/17/2022    Flu vaccine (1) Never done    HIV screen  08/28/2057 (Originally 4/3/1982)    Breast cancer screen  10/15/2023    Depression Screen  11/14/2023    Diabetes screen  10/14/2024    Lipids  10/14/2026    DTaP/Tdap/Td vaccine (2 - Td or Tdap) 08/28/2027    Colorectal Cancer Screen  03/05/2029    Hepatitis A vaccine  Aged Out    Hib vaccine  Aged Out    Meningococcal (ACWY) vaccine  Aged Out    Pneumococcal 0-64 years Vaccine  Aged Out    Hepatitis C screen  Discontinued     Recommendations for Preventive Services Due: see orders and patient instructions/AVS.    No follow-ups on file.

## 2022-11-20 LAB
ABSOLUTE BASO #: 0.07 K/UL (ref 0–0.2)
ABSOLUTE EOS #: 0.11 K/UL (ref 0–0.5)
ABSOLUTE LYMPH #: 1.42 K/UL (ref 1–4)
ABSOLUTE MONO #: 0.48 K/UL (ref 0.2–1)
ABSOLUTE NEUT #: 3.65 K/UL (ref 1.5–7.5)
ALBUMIN SERPL-MCNC: 4.8 G/DL (ref 3.5–5.2)
ALK PHOSPHATASE: 68 U/L (ref 40–133)
ALT SERPL-CCNC: 13 U/L (ref 5–40)
ANION GAP SERPL CALCULATED.3IONS-SCNC: 11 MEQ/L (ref 7–16)
AST SERPL-CCNC: 14 U/L (ref 9–40)
BASOPHILS RELATIVE PERCENT: 1.2 %
BILIRUB SERPL-MCNC: 0.4 MG/DL
BILIRUBIN DIRECT: <0.2 MG/DL (ref 0–0.3)
BUN BLDV-MCNC: 15 MG/DL (ref 6–20)
CALCIUM SERPL-MCNC: 9.2 MG/DL (ref 8.5–10.5)
CHLORIDE BLD-SCNC: 104 MEQ/L (ref 95–107)
CHOLESTEROL/HDL RATIO: 3.6 RATIO
CHOLESTEROL: 228 MG/DL
CO2: 25 MEQ/L (ref 19–31)
CREAT SERPL-MCNC: 0.62 MG/DL (ref 0.6–1.3)
EGFR IF NONAFRICAN AMERICAN: 105 ML/MIN/1.73
EOSINOPHILS RELATIVE PERCENT: 1.9 %
GLUCOSE: 105 MG/DL (ref 70–99)
HCT VFR BLD CALC: 43.2 % (ref 34–45)
HDLC SERPL-MCNC: 63 MG/DL
HEMOGLOBIN: 14 G/DL (ref 11.5–15.5)
IRON SATURATION: 27 % (ref 20–50)
IRON, SERUM: 97 UG/DL (ref 37–145)
LDL CHOLESTEROL CALCULATED: 144 MG/DL
LDL/HDL RATIO: 2.3 RATIO
LYMPHOCYTE %: 24.7 %
MCH RBC QN AUTO: 27.1 PG (ref 25–33)
MCHC RBC AUTO-ENTMCNC: 32.4 G/DL (ref 31–36)
MCV RBC AUTO: 83.7 FL (ref 80–99)
MONOCYTES # BLD: 8.4 %
NEUTROPHILS RELATIVE PERCENT: 63.6 %
PDW BLD-RTO: 15.3 % (ref 11.5–15)
PLATELETS: 283 K/UL (ref 130–400)
PMV BLD AUTO: 11.3 FL (ref 9.3–13)
POTASSIUM SERPL-SCNC: 4.7 MEQ/L (ref 3.5–5.4)
RBC: 5.16 M/UL (ref 3.8–5.4)
SODIUM BLD-SCNC: 140 MEQ/L (ref 133–146)
T4 FREE: 1.74 NG/DL (ref 0.8–1.9)
T4 TOTAL: 10.2 UG/DL (ref 4.5–10.5)
TOTAL IRON BINDING CAPACITY: 362 UG/DL (ref 250–450)
TOTAL PROTEIN: 6.7 G/DL (ref 6.1–8.3)
TRIGL SERPL-MCNC: 106 MG/DL
TSH SERPL DL<=0.05 MIU/L-ACNC: 0.29 UIU/ML (ref 0.4–4.1)
UNSATURATED IRON BINDING CAPACITY: 265 UG/DL (ref 112–347)
VLDLC SERPL CALC-MCNC: 21 MG/DL
WBC: 5.7 K/UL (ref 3.5–11)

## 2022-11-21 LAB
AVERAGE GLUCOSE: 120 MG/DL
HBA1C MFR BLD: 5.8 % (ref 4.2–5.6)
VITAMIN D 25-HYDROXY: 26 NG/ML

## 2022-11-22 ENCOUNTER — TELEPHONE (OUTPATIENT)
Dept: FAMILY MEDICINE CLINIC | Age: 57
End: 2022-11-22

## 2022-11-22 NOTE — TELEPHONE ENCOUNTER
Pt notified. She states that she is taking synthroid 150 daily and on Sunday she takes Two 150mg tablets.  Pt sees Nica Strauss for endo

## 2022-11-22 NOTE — TELEPHONE ENCOUNTER
----- Message from ZAMZAM Simon CNP sent at 11/21/2022  2:09 PM EST -----  Vitamin D is low at 26 - Please start on 2,000 units of Vitamin D daily, may increase to 2 tablets daily after the first week or two. Once those are gone you can buy the 5,000 unit tablets and take one daily    A1C is slightly elevated - please avoid sugars and carbs in the diet - exercise most days - lots of water (half your weight in ounces daily)    Cholesterol is elevated - follow a low cholesterol diet avoiding greasy, fatty, fried foods, and red meats    TSH is too suppressed - I see a prescription for synthroid from 2015 - is she still taking this, if so what dose?

## 2022-11-23 NOTE — TELEPHONE ENCOUNTER
I called and spoke with Mariam Pascual at Dr Freoz Scott office at [facility] at [phone] option 2. She can see labs in epic and will forward them on to dr Feroz Scott and call pt with response.

## 2022-11-29 ENCOUNTER — HOSPITAL ENCOUNTER (OUTPATIENT)
Dept: WOMENS IMAGING | Age: 57
Discharge: HOME | End: 2022-11-29
Payer: COMMERCIAL

## 2022-11-29 DIAGNOSIS — Z12.31 VISIT FOR SCREENING MAMMOGRAM: ICD-10-CM

## 2022-11-29 PROCEDURE — 77067 SCR MAMMO BI INCL CAD: CPT

## 2022-12-05 ENCOUNTER — HOSPITAL ENCOUNTER (OUTPATIENT)
Dept: WOMENS IMAGING | Age: 57
Discharge: HOME | End: 2022-12-05
Payer: COMMERCIAL

## 2022-12-05 DIAGNOSIS — R92.2 BREAST DENSITY: ICD-10-CM

## 2022-12-05 PROCEDURE — 76642 ULTRASOUND BREAST LIMITED: CPT

## 2022-12-05 PROCEDURE — G0279 TOMOSYNTHESIS, MAMMO: HCPCS

## 2022-12-07 DIAGNOSIS — R63.2 BINGE EATING: ICD-10-CM

## 2022-12-07 RX ORDER — FLUOXETINE HYDROCHLORIDE 20 MG/1
CAPSULE ORAL
Qty: 30 CAPSULE | Refills: 0 | OUTPATIENT
Start: 2022-12-07

## 2022-12-12 ENCOUNTER — OFFICE VISIT (OUTPATIENT)
Dept: FAMILY MEDICINE CLINIC | Age: 57
End: 2022-12-12
Payer: COMMERCIAL

## 2022-12-12 VITALS
WEIGHT: 213 LBS | HEART RATE: 84 BPM | DIASTOLIC BLOOD PRESSURE: 74 MMHG | BODY MASS INDEX: 36.56 KG/M2 | SYSTOLIC BLOOD PRESSURE: 116 MMHG | RESPIRATION RATE: 16 BRPM

## 2022-12-12 DIAGNOSIS — F41.9 ANXIETY: ICD-10-CM

## 2022-12-12 DIAGNOSIS — E89.0 POST-SURGICAL HYPOTHYROIDISM: ICD-10-CM

## 2022-12-12 DIAGNOSIS — E66.9 OBESITY (BMI 30-39.9): Primary | ICD-10-CM

## 2022-12-12 PROCEDURE — 99213 OFFICE O/P EST LOW 20 MIN: CPT | Performed by: NURSE PRACTITIONER

## 2022-12-12 RX ORDER — LEVOTHYROXINE SODIUM 0.15 MG/1
TABLET ORAL
COMMUNITY
Start: 2022-11-22

## 2022-12-12 ASSESSMENT — ENCOUNTER SYMPTOMS
WHEEZING: 0
COLOR CHANGE: 0
NAUSEA: 0
DIARRHEA: 0
VOMITING: 0
BACK PAIN: 0
FACIAL SWELLING: 0
TROUBLE SWALLOWING: 0
SHORTNESS OF BREATH: 0
EYE PAIN: 0
COUGH: 0
SINUS PAIN: 0
SORE THROAT: 0
ABDOMINAL PAIN: 0

## 2022-12-12 NOTE — PROGRESS NOTES
[address] [facility] [address]  Dept: [phone]  Dept Fax: [phone]: [phone]     2022     Diego Monzon (:  1965) is a 56 y.o. female, here for evaluation of the following medical concerns:    Chief Complaint   Patient presents with    Follow-up     No concerns        HPI    Pt presents to the office today for follow up after labs and starting Prozac. Her dose of synthroid was lowered after last labs showed decreased TSH. Pt is doing well on new dose. She was adjusted per [facility] endocrinology and repeat labs were order with that office. Hypothyroidism: Recent symptoms: none. She denies weight gain, weight loss, cold intolerance, and heat intolerance. Patient is  taking her medication consistently on an empty stomach. Lowered to 150 mg daily and only 225 mg on . She does follow up with [facility] for endocrinology. No results found for: [facility]  Lab Results   Component Value Date    TSH 0.294 (L) 2022    TSH 2.86 10/14/2021    TSH 2.400 2016       Wt Readings from Last 3 Encounters:   22 213 lb (96.6 kg)   22 215 lb 6.4 oz (97.7 kg)   04/15/22 195 lb 3.2 oz (88.5 kg)     BMI Readings from Last 3 Encounters:   22 36.56 kg/m²   22 36.97 kg/m²   04/15/22 33.51 kg/m²     Pt presents to the office today for depression/anxiety. Pt currently taking Prozac 20 mg. Pt reports that she has not noticed a difference other than maybe having better focus. She started the medication about 4 weeks ago. Side effects noted: none    Sleep-OK  Interest- OK  Guilt- OK  Energy- OK  Concentration- OK  Appetite- improved. Psychomotor Retardation- NO  Suicidal/ Homicidal Ideations- NO  Anxiety- about the same for now. Review of Systems   Constitutional:  Negative for chills, fatigue and fever.    HENT:  Negative for congestion, facial swelling, sinus pain, sore throat and trouble swallowing. Eyes:  Negative for pain and visual disturbance. Respiratory:  Negative for cough, shortness of breath and wheezing. Cardiovascular:  Negative for chest pain and palpitations. Gastrointestinal:  Negative for abdominal pain, diarrhea, nausea and vomiting. Genitourinary:  Negative for difficulty urinating, dysuria and urgency. Musculoskeletal:  Negative for back pain, gait problem and neck pain. Skin:  Negative for color change and rash. Neurological:  Negative for dizziness, weakness and headaches. Psychiatric/Behavioral:  Negative for agitation and sleep disturbance. The patient is not nervous/anxious. Prior to Visit Medications    Medication Sig Taking? Authorizing Provider   vitamin D 25 MCG (1000 UT) CAPS Take 2,000 Units by mouth daily Yes Historical Provider, MD   levothyroxine (SYNTHROID) 150 MCG tablet 225 mg on  Yes Historical Provider, MD   zoster recombinant adjuvanted vaccine (SHINGRIX) 50 MCG/0.5ML SUSR injection Inject 0.5 mLs into the muscle See Admin Instructions 1 dose now and repeat in 2-6 months Yes ZAMZAM Panchal CNP   FLUoxetine (PROZAC) 20 MG capsule Take 1 capsule by mouth daily Yes ZAMZAM Panchal CNP   fluticasone (FLONASE) 50 MCG/ACT nasal spray 2 sprays by Each Nostril route 2 times daily Yes Ponce Chaudhary MD        Social History     Tobacco Use    Smoking status: Former     Packs/day: 1.00     Years: 5.00     Pack years: 5.00     Types: Cigarettes     Start date: 1992     Quit date: 1997     Years since quittin.9    Smokeless tobacco: Never   Substance Use Topics    Alcohol use: Yes     Alcohol/week: 0.0 standard drinks     Comment: occasionally         Vitals:    22 1558   BP: 116/74   Pulse: 84   Resp: 16   Weight: 213 lb (96.6 kg)     Estimated body mass index is 36.56 kg/m² as calculated from the following:    Height as of 4/15/22: 5' 4\" (1.626 m).     Weight as of this encounter: 213 lb (96.6 kg). Physical Exam  Vitals reviewed. Constitutional:       General: She is not in acute distress. Appearance: She is well-developed. HENT:      Head: Normocephalic and atraumatic. Mouth/Throat:      Mouth: Mucous membranes are moist.      Pharynx: Oropharynx is clear. No oropharyngeal exudate. Eyes:      General:         Right eye: No discharge. Left eye: No discharge. Conjunctiva/sclera: Conjunctivae normal.   Cardiovascular:      Rate and Rhythm: Normal rate and regular rhythm. Heart sounds: Normal heart sounds. Pulmonary:      Effort: Pulmonary effort is normal. No respiratory distress. Breath sounds: Normal breath sounds. Abdominal:      General: Bowel sounds are normal.      Palpations: Abdomen is soft. Tenderness: There is no abdominal tenderness. Skin:     General: Skin is warm and dry. Neurological:      General: No focal deficit present. Mental Status: She is alert and oriented to person, place, and time. Coordination: Coordination normal.   Psychiatric:         Mood and Affect: Mood normal.         Behavior: Behavior normal.         Thought Content: Thought content normal.         Judgment: Judgment normal.       ASSESSMENT/PLAN:  1. Obesity (BMI 30-39.9)    2. Post-surgical hypothyroidism    3. Anxiety    - Follow up as planned with [facility] endocrinology. Repeat TSH and T4 as ordered per [facility]. - Work on diet and exercise for optimal weight loss and cardiovascular risk reduction.    - Continue current dose of Prozac and follow up in 2 months for recheck. - Call office with any questions or concerns, or if symptoms are getting worse or changing  - Greater than 50% of this 20 min visit was spent on counseling and coordination of care. Return in about 2 months (around 2/12/2023) for Routine follow up, Medication check. Patient given educational materials - see patient instructions.   Discussed use, benefit, and side effects of prescribed medications. All patient questions answered. Pt voiced understanding. Reviewed health maintenance. An electronic signature was used to authenticate this note.     --ZAMZAM Eason - CNP on 12/13/2022 at 8:15 AM

## 2022-12-13 DIAGNOSIS — R63.2 BINGE EATING: ICD-10-CM

## 2022-12-13 PROBLEM — E66.09 NON MORBID OBESITY DUE TO EXCESS CALORIES: Status: RESOLVED | Noted: 2017-08-28 | Resolved: 2022-12-13

## 2022-12-13 RX ORDER — FLUOXETINE HYDROCHLORIDE 20 MG/1
20 CAPSULE ORAL DAILY
Qty: 90 CAPSULE | Refills: 3 | Status: SHIPPED | OUTPATIENT
Start: 2022-12-13

## 2022-12-30 ENCOUNTER — TELEPHONE (OUTPATIENT)
Dept: FAMILY MEDICINE CLINIC | Age: 57
End: 2022-12-30

## 2022-12-30 RX ORDER — AMOXICILLIN AND CLAVULANATE POTASSIUM 875; 125 MG/1; MG/1
1 TABLET, FILM COATED ORAL 2 TIMES DAILY
Qty: 20 TABLET | Refills: 0 | Status: SHIPPED | OUTPATIENT
Start: 2022-12-30 | End: 2023-01-09

## 2022-12-30 NOTE — TELEPHONE ENCOUNTER
Pt calls in requesting an atb for a sinus infection. Pt has had 5 days of sinus congestion and facial pain. No fever and no cough. Negative home covid test. 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 She is asking since office is full if an ATB can be called in. Using OTC allerga D, dayqui l and nyquil with humidifier.      Ra-Ashu

## 2023-02-06 ENCOUNTER — OFFICE VISIT (OUTPATIENT)
Dept: FAMILY MEDICINE CLINIC | Age: 58
End: 2023-02-06
Payer: COMMERCIAL

## 2023-02-06 VITALS
DIASTOLIC BLOOD PRESSURE: 80 MMHG | RESPIRATION RATE: 16 BRPM | WEIGHT: 217.6 LBS | BODY MASS INDEX: 37.35 KG/M2 | HEART RATE: 80 BPM | SYSTOLIC BLOOD PRESSURE: 130 MMHG

## 2023-02-06 DIAGNOSIS — J32.9 RECURRENT SINUS INFECTIONS: Primary | ICD-10-CM

## 2023-02-06 DIAGNOSIS — F41.9 ANXIETY: ICD-10-CM

## 2023-02-06 DIAGNOSIS — E89.0 POST-SURGICAL HYPOTHYROIDISM: ICD-10-CM

## 2023-02-06 PROCEDURE — 99214 OFFICE O/P EST MOD 30 MIN: CPT | Performed by: NURSE PRACTITIONER

## 2023-02-06 RX ORDER — FLUOXETINE 10 MG/1
10 CAPSULE ORAL DAILY
Qty: 90 CAPSULE | Refills: 1 | Status: SHIPPED | OUTPATIENT
Start: 2023-02-06

## 2023-02-06 RX ORDER — NYSTATIN 100000 [USP'U]/G
POWDER TOPICAL PRN
COMMUNITY
Start: 2023-01-05

## 2023-02-06 RX ORDER — DOXYCYCLINE HYCLATE 100 MG
100 TABLET ORAL 2 TIMES DAILY
Qty: 20 TABLET | Refills: 0 | Status: SHIPPED | OUTPATIENT
Start: 2023-02-06 | End: 2023-02-16

## 2023-02-06 SDOH — ECONOMIC STABILITY: HOUSING INSECURITY
IN THE LAST 12 MONTHS, WAS THERE A TIME WHEN YOU DID NOT HAVE A STEADY PLACE TO SLEEP OR SLEPT IN A SHELTER (INCLUDING NOW)?: NO

## 2023-02-06 SDOH — ECONOMIC STABILITY: FOOD INSECURITY: WITHIN THE PAST 12 MONTHS, YOU WORRIED THAT YOUR FOOD WOULD RUN OUT BEFORE YOU GOT MONEY TO BUY MORE.: NEVER TRUE

## 2023-02-06 SDOH — ECONOMIC STABILITY: FOOD INSECURITY: WITHIN THE PAST 12 MONTHS, THE FOOD YOU BOUGHT JUST DIDN'T LAST AND YOU DIDN'T HAVE MONEY TO GET MORE.: NEVER TRUE

## 2023-02-06 SDOH — ECONOMIC STABILITY: INCOME INSECURITY: HOW HARD IS IT FOR YOU TO PAY FOR THE VERY BASICS LIKE FOOD, HOUSING, MEDICAL CARE, AND HEATING?: NOT HARD AT ALL

## 2023-02-06 ASSESSMENT — ENCOUNTER SYMPTOMS
SINUS PRESSURE: 1
SHORTNESS OF BREATH: 0
SORE THROAT: 0
HOARSE VOICE: 0
COUGH: 1

## 2023-02-06 NOTE — PROGRESS NOTES
[address] [facility] [place] [address]  Dept: [phone]  Dept Fax: [phone]: [phone]     2023     Dylan Medina (:  1965) is a 56 y.o. female, here for evaluation of the following medical concerns:    Chief Complaint   Patient presents with    Follow-up     2 month follow up. Pt feels that she might have a possible sinus infection, but other than that she feels things are going well. Sinusitis  This is a new problem. The current episode started 1 to 4 weeks ago. The problem has been gradually worsening since onset. There has been no fever. Associated symptoms include congestion, coughing and sinus pressure. Pertinent negatives include no chills, diaphoresis, ear pain, headaches, hoarse voice, neck pain, shortness of breath, sneezing or sore throat. Past treatments include oral decongestants and sitting up. The treatment provided mild relief. Pt presents to the office today for depression/anxiety. Pt currently taking Prozac 20 mg. Pt does not feel much different since starting the Prozac. She still feels like she cant not concentrate for very long. Side effects noted: none    Sleep-OK  Interest- OK  Guilt- OK  Energy- OK  Concentration- less  Appetite- OK  Psychomotor Retardation- NO  Suicidal/ Homicidal Ideations- NO  Anxiety-OK    Wt Readings from Last 3 Encounters:   23 217 lb 9.6 oz (98.7 kg)   22 213 lb (96.6 kg)   22 215 lb 6.4 oz (97.7 kg)         Hypothyroidism: Recent symptoms: none. She denies cold intolerance, heat intolerance, hair loss, and dry skin. Patient is  taking her medication consistently on an empty stomach. Follows up with [facility] Endocrinology.      No results found for: [facility]  Lab Results   Component Value Date    TSH 0.294 (L) 2022    TSH 2.86 10/14/2021    TSH 2.400 2016       Review of Systems   Constitutional:  Negative for chills and diaphoresis. HENT:  Positive for congestion and sinus pressure. Negative for ear pain, hoarse voice, sneezing and sore throat. Respiratory:  Positive for cough. Negative for shortness of breath. Musculoskeletal:  Negative for neck pain. Neurological:  Negative for headaches. Prior to Visit Medications    Medication Sig Taking? Authorizing Provider   [facility] [id number] UNIT/GM powder as needed Yes Historical Provider, MD   doxycycline hyclate (VIBRA-TABS) 100 MG tablet Take 1 tablet by mouth 2 times daily for 10 days Yes ZAMZAM Diggs CNP   FLUoxetine (PROZAC) 10 MG capsule Take 1 capsule by mouth daily Yes ZAMZAM Diggs CNP   FLUoxetine (PROZAC) 20 MG capsule Take 1 capsule by mouth daily Yes ZAMZAM Diggs CNP   vitamin D 25 MCG (1000 UT) CAPS Take 2,000 Units by mouth daily Yes Historical Provider, MD   levothyroxine (SYNTHROID) 150 MCG tablet 225 mg on  Yes Historical Provider, MD   fluticasone (FLONASE) 50 MCG/ACT nasal spray 2 sprays by Each Nostril route 2 times daily Yes Makenzie Burgos MD   zoster recombinant adjuvanted vaccine [facility]) 50 MCG/0.5ML SUSR injection Inject 0.5 mLs into the muscle See Admin Instructions 1 dose now and repeat in 2-6 months  Patient not taking: Reported on 2023  ZAMZAM Baez CNP        Social History     Tobacco Use    Smoking status: Former     Packs/day: 1.00     Years: 5.00     Pack years: 5.00     Types: Cigarettes     Start date: 1992     Quit date: 1997     Years since quittin.1    Smokeless tobacco: Never   Substance Use Topics    Alcohol use: Yes     Alcohol/week: 0.0 standard drinks     Comment: occasionally         Vitals:    23 1603   BP: 130/80   Pulse: 80   Resp: 16   Weight: 217 lb 9.6 oz (98.7 kg)     Estimated body mass index is 37.35 kg/m² as calculated from the following:    Height as of 4/15/22: 5' 4\" (1.626 m). Weight as of this encounter: 217 lb 9.6 oz (98.7 kg).     Physical Exam  Vitals reviewed. Constitutional:       General: She is not in acute distress. Appearance: Normal appearance. She is well-developed. HENT:      Head: Normocephalic and atraumatic. Right Ear: Hearing, tympanic membrane, ear canal and external ear normal.      Left Ear: Hearing, tympanic membrane, ear canal and external ear normal.      Nose: Rhinorrhea present. No nasal tenderness. Right Sinus: Maxillary sinus tenderness and frontal sinus tenderness present. Left Sinus: Maxillary sinus tenderness and frontal sinus tenderness present. Mouth/Throat:      Lips: Pink. Mouth: Mucous membranes are moist. No oral lesions. Pharynx: Oropharynx is clear. Uvula midline. Posterior oropharyngeal erythema present. Eyes:      General:         Right eye: No discharge. Left eye: No discharge. Conjunctiva/sclera: Conjunctivae normal.   Neck:      Trachea: No tracheal deviation. Cardiovascular:      Rate and Rhythm: Normal rate and regular rhythm. Heart sounds: Normal heart sounds. No murmur heard. Pulmonary:      Effort: Pulmonary effort is normal. No respiratory distress. Breath sounds: Normal breath sounds. Abdominal:      General: Bowel sounds are normal.      Palpations: Abdomen is soft. Tenderness: There is no abdominal tenderness. Musculoskeletal:      Cervical back: Full passive range of motion without pain and neck supple. Lymphadenopathy:      Head:      Right side of head: No submental, submandibular, tonsillar, preauricular, posterior auricular or occipital adenopathy. Left side of head: No submental, submandibular, tonsillar, preauricular, posterior auricular or occipital adenopathy. Cervical: No cervical adenopathy. Skin:     General: Skin is warm and dry. Findings: No rash. Neurological:      General: No focal deficit present. Mental Status: She is alert and oriented to person, place, and time.       Coordination: Coordination normal.   Psychiatric:         Mood and Affect: Mood normal.         Behavior: Behavior normal.         Thought Content: Thought content normal.         Judgment: Judgment normal.       ASSESSMENT/PLAN:  1. Recurrent sinus infections  - doxycycline hyclate (VIBRA-TABS) 100 MG tablet; Take 1 tablet by mouth 2 times daily for 10 days  Dispense: 20 tablet; Refill: 0    2. Anxiety  - FLUoxetine (PROZAC) 10 MG capsule; Take 1 capsule by mouth daily  Dispense: 90 capsule; Refill: 1    3. Post-surgical hypothyroidism    - Will add Prozac 10 mg to daily dose, total 30 mg daily now. Monitor symptoms and call with any changes. - Treat sinus infection today. Rest and increase fluids  - Tylenol as needed for pain. - Follow up with [facility] as planned for thyroid. - Call office with any questions or concerns, or if symptoms are getting worse or changing    Return in about 3 months (around 5/6/2023), or if symptoms worsen or fail to improve, for Wellness/Physical, Routine follow up. Patient given educational materials - see patient instructions. Discussed use, benefit, and side effects of prescribed medications. All patient questions answered. Pt voiced understanding. Reviewed health maintenance. An electronic signature was used to authenticate this note.     --ZAMZAM Weinberg - CNP on 2/7/2023 at 8:11 AM

## 2023-05-18 DIAGNOSIS — J02.0 ACUTE STREPTOCOCCAL PHARYNGITIS: Primary | ICD-10-CM

## 2023-05-18 RX ORDER — AMOXICILLIN 500 MG/1
500 CAPSULE ORAL 2 TIMES DAILY
Qty: 20 CAPSULE | Refills: 0 | Status: SHIPPED | OUTPATIENT
Start: 2023-05-18 | End: 2023-05-28

## 2023-05-30 ENCOUNTER — TELEPHONE (OUTPATIENT)
Dept: FAMILY MEDICINE CLINIC | Age: 58
End: 2023-05-30

## 2023-05-30 NOTE — TELEPHONE ENCOUNTER
Noted.  Rest and increase clear fluids. Avoid extra salt intake. Elevate legs above the level of the heart (on pillows on the couch) at least 20 min, 3 times daily. Pt may also take ibuprofen as needed for pain or cramping in legs. Call office with any questions or concerns, or if symptoms are getting worse or changing. ER for any SOB or chest pain.  -WS

## 2023-05-30 NOTE — TELEPHONE ENCOUNTER
Pt returned home from [employer] in [place] where they went [place], kayaking and walking/touring the city. Pt states that it was extremely hot and they walked often. Little water in take. Her feet are bilaterally swollen and hurt to walk. Denies chest pain and sob. Not painful in the legs or calves. She is asking what to do or if anything can be called in short term. Office full for the next 2 days. Pt currently elevating legs.

## 2023-12-12 ENCOUNTER — HOSPITAL ENCOUNTER (OUTPATIENT)
Dept: WOMENS IMAGING | Age: 58
Discharge: HOME | End: 2023-12-12
Payer: COMMERCIAL

## 2023-12-12 VITALS — WEIGHT: 198 LBS | HEIGHT: 63 IN | BODY MASS INDEX: 35.08 KG/M2

## 2023-12-12 DIAGNOSIS — Z12.31 VISIT FOR SCREENING MAMMOGRAM: ICD-10-CM

## 2023-12-12 PROCEDURE — 77063 BREAST TOMOSYNTHESIS BI: CPT

## 2023-12-15 ENCOUNTER — HOSPITAL ENCOUNTER (OUTPATIENT)
Dept: WOMENS IMAGING | Age: 58
Discharge: HOME | End: 2023-12-15
Attending: RADIOLOGY
Payer: COMMERCIAL

## 2023-12-15 DIAGNOSIS — R92.8 ABNORMAL MAMMOGRAM: ICD-10-CM

## 2023-12-15 PROCEDURE — 76642 ULTRASOUND BREAST LIMITED: CPT

## 2024-01-08 ENCOUNTER — TELEMEDICINE (OUTPATIENT)
Dept: FAMILY MEDICINE CLINIC | Age: 59
End: 2024-01-08
Payer: COMMERCIAL

## 2024-01-08 DIAGNOSIS — J20.9 BRONCHITIS WITH BRONCHOSPASM: Primary | ICD-10-CM

## 2024-01-08 PROCEDURE — 99213 OFFICE O/P EST LOW 20 MIN: CPT | Performed by: NURSE PRACTITIONER

## 2024-01-08 RX ORDER — AZITHROMYCIN 250 MG/1
250 TABLET, FILM COATED ORAL SEE ADMIN INSTRUCTIONS
Qty: 6 TABLET | Refills: 0 | Status: SHIPPED | OUTPATIENT
Start: 2024-01-08 | End: 2024-01-13

## 2024-01-08 RX ORDER — METHYLPREDNISOLONE 4 MG/1
TABLET ORAL
Qty: 1 KIT | Refills: 0 | Status: SHIPPED | OUTPATIENT
Start: 2024-01-08 | End: 2024-01-14

## 2024-01-08 RX ORDER — ALBUTEROL SULFATE 90 UG/1
2 AEROSOL, METERED RESPIRATORY (INHALATION) 4 TIMES DAILY PRN
Qty: 18 G | Refills: 0 | Status: SHIPPED | OUTPATIENT
Start: 2024-01-08

## 2024-01-08 RX ORDER — BENZONATATE 100 MG/1
100 CAPSULE ORAL 3 TIMES DAILY PRN
Qty: 30 CAPSULE | Refills: 0 | Status: SHIPPED | OUTPATIENT
Start: 2024-01-08 | End: 2024-01-18

## 2024-01-08 ASSESSMENT — ENCOUNTER SYMPTOMS
HEARTBURN: 0
HEMOPTYSIS: 0
RHINORRHEA: 1
COUGH: 1
SORE THROAT: 0
SHORTNESS OF BREATH: 0

## 2024-01-08 NOTE — PROGRESS NOTES
[facility]  [address]  [facility] [place] [address]  Dept: [phone]  Dept Fax: [phone]  Loc: [phone]        Farzana Carlos, was evaluated through a synchronous (real-time) audio-video encounter. The patient (or guardian if applicable) is aware that this is a billable service, which includes applicable co-pays. This Virtual Visit was conducted with patient's (and/or legal guardian's) consent. Patient identification was verified, and a caregiver was present when appropriate.   The patient was located at Home: [address] [place] [id number]  Provider was located at Facility (Appt Dept): [address] [place] [address]      Farzana Carlos (:  1965) is a Established patient, presenting virtually for evaluation of the following:    Assessment & Plan   Below is the assessment and plan developed based on review of pertinent history, physical exam, labs, studies, and medications.  1. Bronchitis with bronchospasm  -     benzonatate (TESSALON) 100 MG capsule; Take 1 capsule by mouth 3 times daily as needed for Cough, Disp-30 capsule, R-0Normal  -     methylPREDNISolone (MEDROL DOSEPACK) 4 MG tablet; Take by mouth., Disp-1 kit, R-0Normal  -     azithromycin (ZITHROMAX) 250 MG tablet; Take 1 tablet by mouth See Admin Instructions for 5 days 500mg on day 1 followed by 250mg on days 2 - 5, Disp-6 tablet, R-0Normal  -     albuterol sulfate HFA (VENTOLIN HFA) 108 (90 Base) MCG/ACT inhaler; Inhale 2 puffs into the lungs 4 times daily as needed for Wheezing, Disp-18 g, R-0Normal    - Rest and increase fluids  - Tylenol as needed for pain and fever  - Good handwashing and clean all surfaces touched  - Call office with any questions or concerns, or if symptoms are getting worse or changing  - ER for any chest pain or SOB      Return if symptoms worsen or fail to improve.       Subjective   Pt presents to the office today via VV for cough and

## 2024-04-30 RX ORDER — POLYMYXIN B SULFATE AND TRIMETHOPRIM 1; 10000 MG/ML; [USP'U]/ML
SOLUTION OPHTHALMIC
Qty: 10 ML | Refills: 0 | Status: SHIPPED | OUTPATIENT
Start: 2024-04-30

## 2024-04-30 RX ORDER — POLYMYXIN B SULFATE AND TRIMETHOPRIM 1; 10000 MG/ML; [USP'U]/ML
1 SOLUTION OPHTHALMIC EVERY 4 HOURS
Qty: 3 ML | Refills: 0 | OUTPATIENT
Start: 2024-04-30 | End: 2024-05-10

## 2024-04-30 NOTE — TELEPHONE ENCOUNTER
Pt has misplaced her polytrim. Her daughter left for college yesterday and took them in error. I spoke with [facility] and they stated that they could refill this with a discount card if she has a new Rx for them. Med pended.

## 2024-07-16 ENCOUNTER — LAB (OUTPATIENT)
Dept: LAB | Age: 59
End: 2024-07-16

## 2024-07-16 DIAGNOSIS — E89.0 POST-SURGICAL HYPOTHYROIDISM: ICD-10-CM

## 2024-07-16 DIAGNOSIS — E78.00 HYPERCHOLESTEREMIA: ICD-10-CM

## 2024-07-16 DIAGNOSIS — R73.01 IFG (IMPAIRED FASTING GLUCOSE): ICD-10-CM

## 2024-07-16 LAB
25(OH)D3 SERPL-MCNC: 28 NG/ML (ref 30–100)
ALBUMIN SERPL BCG-MCNC: 4.8 G/DL (ref 3.5–5.1)
ALP SERPL-CCNC: 81 U/L (ref 38–126)
ALT SERPL W/O P-5'-P-CCNC: 14 U/L (ref 11–66)
ANION GAP SERPL CALC-SCNC: 13 MEQ/L (ref 8–16)
AST SERPL-CCNC: 13 U/L (ref 5–40)
BASOPHILS ABSOLUTE: 0.1 THOU/MM3 (ref 0–0.1)
BASOPHILS NFR BLD AUTO: 0.8 %
BILIRUB SERPL-MCNC: 0.4 MG/DL (ref 0.3–1.2)
BUN SERPL-MCNC: 13 MG/DL (ref 7–22)
CALCIUM SERPL-MCNC: 10 MG/DL (ref 8.5–10.5)
CHLORIDE SERPL-SCNC: 99 MEQ/L (ref 98–111)
CHOLEST SERPL-MCNC: 231 MG/DL (ref 100–199)
CO2 SERPL-SCNC: 26 MEQ/L (ref 23–33)
CREAT SERPL-MCNC: 0.6 MG/DL (ref 0.4–1.2)
DEPRECATED MEAN GLUCOSE BLD GHB EST-ACNC: 105 MG/DL (ref 70–126)
DEPRECATED RDW RBC AUTO: 41.1 FL (ref 35–45)
EOSINOPHIL NFR BLD AUTO: 2 %
EOSINOPHILS ABSOLUTE: 0.1 THOU/MM3 (ref 0–0.4)
ERYTHROCYTE [DISTWIDTH] IN BLOOD BY AUTOMATED COUNT: 12.5 % (ref 11.5–14.5)
GFR SERPL CREATININE-BSD FRML MDRD: > 90 ML/MIN/1.73M2
GLUCOSE SERPL-MCNC: 113 MG/DL (ref 70–108)
HBA1C MFR BLD HPLC: 5.5 % (ref 4.4–6.4)
HCT VFR BLD AUTO: 44.6 % (ref 37–47)
HDLC SERPL-MCNC: 50 MG/DL
HGB BLD-MCNC: 15.4 GM/DL (ref 12–16)
IMM GRANULOCYTES # BLD AUTO: 0.01 THOU/MM3 (ref 0–0.07)
IMM GRANULOCYTES NFR BLD AUTO: 0.1 %
LDLC SERPL CALC-MCNC: 143 MG/DL
LYMPHOCYTES ABSOLUTE: 1.6 THOU/MM3 (ref 1–4.8)
LYMPHOCYTES NFR BLD AUTO: 22.2 %
MCH RBC QN AUTO: 31.2 PG (ref 26–33)
MCHC RBC AUTO-ENTMCNC: 34.5 GM/DL (ref 32.2–35.5)
MCV RBC AUTO: 90.5 FL (ref 81–99)
MONOCYTES ABSOLUTE: 0.6 THOU/MM3 (ref 0.4–1.3)
MONOCYTES NFR BLD AUTO: 8.2 %
NEUTROPHILS ABSOLUTE: 4.7 THOU/MM3 (ref 1.8–7.7)
NEUTROPHILS NFR BLD AUTO: 66.7 %
NRBC BLD AUTO-RTO: 0 /100 WBC
PLATELET # BLD AUTO: 246 THOU/MM3 (ref 130–400)
PMV BLD AUTO: 10.8 FL (ref 9.4–12.4)
POTASSIUM SERPL-SCNC: 4.2 MEQ/L (ref 3.5–5.2)
PROT SERPL-MCNC: 7.3 G/DL (ref 6.1–8)
RBC # BLD AUTO: 4.93 MILL/MM3 (ref 4.2–5.4)
SODIUM SERPL-SCNC: 138 MEQ/L (ref 135–145)
T4 FREE SERPL-MCNC: 1.78 NG/DL (ref 0.93–1.68)
TRIGL SERPL-MCNC: 192 MG/DL (ref 0–199)
TSH SERPL DL<=0.005 MIU/L-ACNC: 0.12 UIU/ML (ref 0.4–4.2)
WBC # BLD AUTO: 7.1 THOU/MM3 (ref 4.8–10.8)

## 2024-07-17 ENCOUNTER — OFFICE VISIT (OUTPATIENT)
Dept: FAMILY MEDICINE CLINIC | Age: 59
End: 2024-07-17
Payer: COMMERCIAL

## 2024-07-17 VITALS
DIASTOLIC BLOOD PRESSURE: 76 MMHG | HEART RATE: 76 BPM | WEIGHT: 219.4 LBS | RESPIRATION RATE: 16 BRPM | SYSTOLIC BLOOD PRESSURE: 108 MMHG | BODY MASS INDEX: 38.86 KG/M2 | TEMPERATURE: 98.3 F

## 2024-07-17 DIAGNOSIS — M79.671 BILATERAL FOOT PAIN: ICD-10-CM

## 2024-07-17 DIAGNOSIS — E66.9 OBESITY (BMI 30-39.9): ICD-10-CM

## 2024-07-17 DIAGNOSIS — R73.01 IFG (IMPAIRED FASTING GLUCOSE): ICD-10-CM

## 2024-07-17 DIAGNOSIS — E89.0 POST-SURGICAL HYPOTHYROIDISM: ICD-10-CM

## 2024-07-17 DIAGNOSIS — Z85.850 H/O MALIGNANT NEOPLASM OF THYROID: ICD-10-CM

## 2024-07-17 DIAGNOSIS — Z00.00 WELLNESS EXAMINATION: Primary | ICD-10-CM

## 2024-07-17 DIAGNOSIS — E78.00 HYPERCHOLESTEREMIA: ICD-10-CM

## 2024-07-17 DIAGNOSIS — F41.9 ANXIETY: ICD-10-CM

## 2024-07-17 DIAGNOSIS — M79.672 BILATERAL FOOT PAIN: ICD-10-CM

## 2024-07-17 PROBLEM — C73 PAPILLARY CARCINOMA OF THYROID (HCC): Status: RESOLVED | Noted: 2024-07-17 | Resolved: 2024-07-17

## 2024-07-17 PROBLEM — C73 PAPILLARY CARCINOMA OF THYROID (HCC): Status: ACTIVE | Noted: 2024-07-17

## 2024-07-17 PROCEDURE — 99214 OFFICE O/P EST MOD 30 MIN: CPT | Performed by: NURSE PRACTITIONER

## 2024-07-17 RX ORDER — LEVOTHYROXINE SODIUM 0.12 MG/1
125 TABLET ORAL DAILY
Qty: 90 TABLET | Refills: 1 | Status: SHIPPED | OUTPATIENT
Start: 2024-07-17

## 2024-07-17 RX ORDER — PHENTERMINE HYDROCHLORIDE 37.5 MG/1
37.5 TABLET ORAL
Qty: 30 TABLET | Refills: 0 | Status: SHIPPED | OUTPATIENT
Start: 2024-07-17 | End: 2024-08-16

## 2024-07-17 SDOH — ECONOMIC STABILITY: FOOD INSECURITY: WITHIN THE PAST 12 MONTHS, THE FOOD YOU BOUGHT JUST DIDN'T LAST AND YOU DIDN'T HAVE MONEY TO GET MORE.: NEVER TRUE

## 2024-07-17 SDOH — ECONOMIC STABILITY: FOOD INSECURITY: WITHIN THE PAST 12 MONTHS, YOU WORRIED THAT YOUR FOOD WOULD RUN OUT BEFORE YOU GOT MONEY TO BUY MORE.: NEVER TRUE

## 2024-07-17 SDOH — ECONOMIC STABILITY: INCOME INSECURITY: HOW HARD IS IT FOR YOU TO PAY FOR THE VERY BASICS LIKE FOOD, HOUSING, MEDICAL CARE, AND HEATING?: NOT HARD AT ALL

## 2024-07-17 ASSESSMENT — PATIENT HEALTH QUESTIONNAIRE - PHQ9
SUM OF ALL RESPONSES TO PHQ QUESTIONS 1-9: 0
2. FEELING DOWN, DEPRESSED OR HOPELESS: NOT AT ALL
SUM OF ALL RESPONSES TO PHQ QUESTIONS 1-9: 0
SUM OF ALL RESPONSES TO PHQ QUESTIONS 1-9: 0
1. LITTLE INTEREST OR PLEASURE IN DOING THINGS: NOT AT ALL
SUM OF ALL RESPONSES TO PHQ QUESTIONS 1-9: 0
SUM OF ALL RESPONSES TO PHQ9 QUESTIONS 1 & 2: 0

## 2024-07-17 ASSESSMENT — ENCOUNTER SYMPTOMS
TROUBLE SWALLOWING: 0
ABDOMINAL PAIN: 0
VOMITING: 0
COLOR CHANGE: 0
SINUS PAIN: 0
SORE THROAT: 0
FACIAL SWELLING: 0
DIARRHEA: 0
EYE PAIN: 0
NAUSEA: 0
BACK PAIN: 0
SHORTNESS OF BREATH: 0
WHEEZING: 0
COUGH: 0

## 2024-07-17 NOTE — PROGRESS NOTES
[facility]  [facility]  [address] [facility] [address]  Dept: [phone]  Dept Fax: [phone]  Loc: [phone]     2024     Farzana Carlos (:  1965) is a 59 y.o. female, here for evaluation of the following medical concerns:    Chief Complaint   Patient presents with    Annual Exam     Here for annual PE. Labs done, results in Epic. Would like to discuss weight and consider starting Adipex.     Foot Pain     Bilateral foot pain and swelling. So bad that it inhibits walking at times.   Also with \"weird achiness in thighs/hips\" at night.        Pt presents to the office today for annual exam.  She has been dealing with some bilateral foot pain on and off for over a year.  She reports that she Needs to wear tennis shoes all the time or the pain is worse.  Pain all over foot. Some swelling at times.  Pain with walking.      Foot Pain   The pain is present in the left foot and right foot. This is a new problem. The current episode started more than 1 month ago. There has been no history of extremity trauma. The quality of the pain is described as aching. The pain is moderate. Pertinent negatives include no fever. The symptoms are aggravated by activity and lying down. She has tried rest and cold for the symptoms. The treatment provided mild relief. There is no history of diabetes, gout, osteoarthritis or rheumatoid arthritis.     Pt also has been gaining weight and would like help with some weight loss.  She works on diet and exercise, but still gains.   Wt Readings from Last 3 Encounters:   24 99.5 kg (219 lb 6.4 oz)   23 89.8 kg (198 lb)   23 98.7 kg (217 lb 9.6 oz)     BMI Readings from Last 3 Encounters:   24 38.86 kg/m²   23 35.07 kg/m²   23 37.35 kg/m²       Hypothyroidism: Recent symptoms: weight gain. She denies weight loss, cold intolerance, heat intolerance, and hair loss. Patient is  taking her

## 2024-07-19 LAB — THYROGLOB SERPL-MCNC: < 0.5 NG/ML (ref 1.3–31.8)

## 2024-08-13 ENCOUNTER — OFFICE VISIT (OUTPATIENT)
Dept: FAMILY MEDICINE CLINIC | Age: 59
End: 2024-08-13
Payer: COMMERCIAL

## 2024-08-13 VITALS
WEIGHT: 214.2 LBS | DIASTOLIC BLOOD PRESSURE: 62 MMHG | TEMPERATURE: 98 F | BODY MASS INDEX: 37.94 KG/M2 | HEART RATE: 82 BPM | SYSTOLIC BLOOD PRESSURE: 126 MMHG | RESPIRATION RATE: 16 BRPM

## 2024-08-13 DIAGNOSIS — E66.9 OBESITY (BMI 30-39.9): ICD-10-CM

## 2024-08-13 PROCEDURE — 99213 OFFICE O/P EST LOW 20 MIN: CPT | Performed by: NURSE PRACTITIONER

## 2024-08-13 RX ORDER — PREDNISONE 20 MG/1
20 TABLET ORAL DAILY
COMMUNITY
Start: 2024-07-30

## 2024-08-13 RX ORDER — PHENTERMINE HYDROCHLORIDE 37.5 MG/1
37.5 TABLET ORAL
Qty: 30 TABLET | Refills: 0 | Status: SHIPPED | OUTPATIENT
Start: 2024-08-13 | End: 2024-09-12

## 2024-08-13 ASSESSMENT — ENCOUNTER SYMPTOMS
SINUS PAIN: 0
FACIAL SWELLING: 0
SHORTNESS OF BREATH: 0
TROUBLE SWALLOWING: 0
EYE PAIN: 0
SORE THROAT: 0
BACK PAIN: 0
VOMITING: 0
ABDOMINAL PAIN: 0
DIARRHEA: 0
COLOR CHANGE: 0
NAUSEA: 0
WHEEZING: 0
COUGH: 0

## 2024-08-13 NOTE — PROGRESS NOTES
trouble swallowing.    Eyes:  Negative for pain and visual disturbance.   Respiratory:  Negative for cough, shortness of breath and wheezing.    Cardiovascular:  Negative for chest pain and palpitations.   Gastrointestinal:  Negative for abdominal pain, diarrhea, nausea and vomiting.   Genitourinary:  Negative for difficulty urinating, dysuria and urgency.   Musculoskeletal:  Negative for back pain, gait problem and neck pain.   Skin:  Negative for color change and rash.   Neurological:  Negative for dizziness, seizures, weakness and headaches.   Psychiatric/Behavioral:  Negative for agitation and sleep disturbance. The patient is not nervous/anxious.        Objective:     /62   Pulse 82   Temp 98 °F (36.7 °C) (Oral)   Resp 16   Wt 97.2 kg (214 lb 3.2 oz)   LMP 10/01/2021   BMI 37.94 kg/m²     Physical Exam  Vitals reviewed.   Constitutional:       General: She is not in acute distress.     Appearance: She is well-developed.   HENT:      Head: Normocephalic and atraumatic.      Nose: Nose normal.      Mouth/Throat:      Mouth: Mucous membranes are moist.      Pharynx: Oropharynx is clear.   Eyes:      General:         Right eye: No discharge.         Left eye: No discharge.      Conjunctiva/sclera: Conjunctivae normal.   Cardiovascular:      Rate and Rhythm: Regular rhythm.      Heart sounds: Normal heart sounds.   Pulmonary:      Effort: Pulmonary effort is normal. No respiratory distress.      Breath sounds: Normal breath sounds.   Skin:     General: Skin is warm and dry.   Neurological:      General: No focal deficit present.      Mental Status: She is alert and oriented to person, place, and time.      Coordination: Coordination normal.   Psychiatric:         Mood and Affect: Mood normal.         Behavior: Behavior normal.         Thought Content: Thought content normal.         Judgment: Judgment normal.         Assessment/Plan:      Farzana was seen today for 1 month follow-up.    Diagnoses and all

## 2024-08-22 DIAGNOSIS — E66.9 OBESITY (BMI 30-39.9): ICD-10-CM

## 2024-08-22 RX ORDER — PHENTERMINE HYDROCHLORIDE 37.5 MG/1
TABLET ORAL
Qty: 30 TABLET | OUTPATIENT
Start: 2024-08-22

## 2024-09-26 RX ORDER — MELOXICAM 15 MG/1
15 TABLET ORAL DAILY
Qty: 30 TABLET | OUTPATIENT
Start: 2024-09-26

## 2024-10-04 ENCOUNTER — E-VISIT (OUTPATIENT)
Dept: FAMILY MEDICINE CLINIC | Age: 59
End: 2024-10-04
Payer: COMMERCIAL

## 2024-10-04 DIAGNOSIS — J40 BRONCHITIS: Primary | ICD-10-CM

## 2024-10-04 DIAGNOSIS — J01.90 ACUTE BACTERIAL SINUSITIS: ICD-10-CM

## 2024-10-04 DIAGNOSIS — B96.89 ACUTE BACTERIAL SINUSITIS: ICD-10-CM

## 2024-10-04 PROCEDURE — 99422 OL DIG E/M SVC 11-20 MIN: CPT | Performed by: NURSE PRACTITIONER

## 2024-10-04 RX ORDER — ALBUTEROL SULFATE 90 UG/1
2 INHALANT RESPIRATORY (INHALATION) 4 TIMES DAILY PRN
Qty: 18 G | Refills: 0 | Status: SHIPPED | OUTPATIENT
Start: 2024-10-04

## 2024-10-04 RX ORDER — ALBUTEROL SULFATE 0.83 MG/ML
2.5 SOLUTION RESPIRATORY (INHALATION) 4 TIMES DAILY PRN
Qty: 120 EACH | Refills: 3 | Status: SHIPPED | OUTPATIENT
Start: 2024-10-04

## 2024-10-04 RX ORDER — CEFDINIR 300 MG/1
300 CAPSULE ORAL 2 TIMES DAILY
Qty: 20 CAPSULE | Refills: 0 | Status: SHIPPED | OUTPATIENT
Start: 2024-10-04 | End: 2024-10-14

## 2024-10-04 ASSESSMENT — LIFESTYLE VARIABLES
SMOKING_YEARS: 5
PACKS_PER_DAY: 1
SMOKING_STATUS: NO, I'M A FORMER SMOKER

## 2024-10-04 NOTE — PROGRESS NOTES
[facility]  [facility]  [address] [facility] [address]  Dept: [phone]  Dept Fax: [phone]  Loc: [phone]      Farzana S Breanna (1965) initiated an asynchronous digital communication through [employer].    HPI: per patient questionnaire     Exam: not applicable    Diagnoses and all orders for this visit:  Diagnoses and all orders for this visit:    Bronchitis  -     cefdinir (OMNICEF) 300 MG capsule; Take 1 capsule by mouth 2 times daily for 10 days  -     albuterol (PROVENTIL) (2.5 MG/3ML) 0.083% nebulizer solution; Take 3 mLs by nebulization 4 times daily as needed for Wheezing  -     albuterol sulfate HFA (VENTOLIN HFA) 108 (90 Base) MCG/ACT inhaler; Inhale 2 puffs into the lungs 4 times daily as needed for Wheezing    Acute bacterial sinusitis  -     cefdinir (OMNICEF) 300 MG capsule; Take 1 capsule by mouth 2 times daily for 10 days    - Rest and increase fluids  - Tylenol as needed for pain and fever  - Good handwashing and clean all surfaces touched  - Call office with any questions or concerns, or if symptoms are getting worse or changing  - ER for any chest pain or SOB        Time: EV2 - 11-20 minutes were spent on the digital evaluation and management of this patient.     ZAMZAM LICONA - CNP

## 2024-10-14 ENCOUNTER — OFFICE VISIT (OUTPATIENT)
Dept: FAMILY MEDICINE CLINIC | Age: 59
End: 2024-10-14
Payer: COMMERCIAL

## 2024-10-14 VITALS
WEIGHT: 217 LBS | RESPIRATION RATE: 16 BRPM | DIASTOLIC BLOOD PRESSURE: 80 MMHG | TEMPERATURE: 97.8 F | SYSTOLIC BLOOD PRESSURE: 124 MMHG | HEART RATE: 80 BPM | BODY MASS INDEX: 38.44 KG/M2

## 2024-10-14 DIAGNOSIS — E89.0 POST-SURGICAL HYPOTHYROIDISM: ICD-10-CM

## 2024-10-14 DIAGNOSIS — J01.90 ACUTE BACTERIAL SINUSITIS: ICD-10-CM

## 2024-10-14 DIAGNOSIS — E66.9 OBESITY (BMI 30-39.9): Primary | ICD-10-CM

## 2024-10-14 DIAGNOSIS — B96.89 ACUTE BACTERIAL SINUSITIS: ICD-10-CM

## 2024-10-14 PROBLEM — J34.89 NASAL OBSTRUCTION: Status: RESOLVED | Noted: 2022-01-25 | Resolved: 2024-10-14

## 2024-10-14 PROBLEM — E78.2 MIXED HYPERLIPIDEMIA: Status: ACTIVE | Noted: 2017-08-28

## 2024-10-14 PROBLEM — J33.0 ANTROCHOANAL POLYP: Status: RESOLVED | Noted: 2022-01-25 | Resolved: 2024-10-14

## 2024-10-14 PROBLEM — G47.33 OBSTRUCTIVE SLEEP APNEA: Status: RESOLVED | Noted: 2022-01-25 | Resolved: 2024-10-14

## 2024-10-14 PROCEDURE — 99214 OFFICE O/P EST MOD 30 MIN: CPT | Performed by: NURSE PRACTITIONER

## 2024-10-14 RX ORDER — PHENTERMINE HYDROCHLORIDE 37.5 MG/1
37.5 TABLET ORAL
Qty: 30 TABLET | Refills: 0 | Status: SHIPPED | OUTPATIENT
Start: 2024-10-14 | End: 2024-11-13

## 2024-10-14 RX ORDER — AZITHROMYCIN 250 MG/1
TABLET, FILM COATED ORAL
Qty: 6 TABLET | Refills: 0 | Status: SHIPPED | OUTPATIENT
Start: 2024-10-14 | End: 2024-10-24

## 2024-10-14 ASSESSMENT — ENCOUNTER SYMPTOMS
COLOR CHANGE: 0
TROUBLE SWALLOWING: 0
ABDOMINAL PAIN: 0
WHEEZING: 0
COUGH: 0
BACK PAIN: 0
NAUSEA: 0
FACIAL SWELLING: 0
DIARRHEA: 0
SORE THROAT: 0
VOMITING: 0
EYE PAIN: 0
SHORTNESS OF BREATH: 0

## 2024-10-14 NOTE — PROGRESS NOTES
[facility]  [facility]  [address] [facility] [address]  Dept: [phone]  Dept Fax: [phone]  Loc: [phone]     Visit Date:  10/14/2024      Patient:  Farzana Carlos  YOB: 1965    HPI:     Chief Complaint   Patient presents with    Follow-up     Pt here for weight management f/u       HPI  Pt presents to the office today for follow up on weight loss using Adipex.  Month 3, but skipped last month. .  Doing well overall, working on diet and exercise.  Denies any side effects. No chest pain, SOB or palpitations.      Wt Readings from Last 3 Encounters:   10/14/24 98.4 kg (217 lb)   08/13/24 97.2 kg (214 lb 3.2 oz)   07/17/24 99.5 kg (219 lb 6.4 oz)       BMI Readings from Last 3 Encounters:   10/14/24 38.44 kg/m²   08/13/24 37.94 kg/m²   07/17/24 38.86 kg/m²     Pt also having issues with congestion and still dealing with sinus.  She just finished Omnicef for 10 days for sinus infection, still having ear pressure.  No fever or wheezing.     Medications    Current Outpatient Medications:     phentermine (ADIPEX-P) 37.5 MG tablet, Take 1 tablet by mouth every morning (before breakfast) for 30 days. Max Daily Amount: 37.5 mg, Disp: 30 tablet, Rfl: 0    azithromycin (ZITHROMAX) 250 MG tablet, 500mg on day 1 followed by 250mg on days 2 - 5, Disp: 6 tablet, Rfl: 0    albuterol (PROVENTIL) (2.5 MG/3ML) 0.083% nebulizer solution, Take 3 mLs by nebulization 4 times daily as needed for Wheezing, Disp: 120 each, Rfl: 3    albuterol sulfate HFA (VENTOLIN HFA) 108 (90 Base) MCG/ACT inhaler, Inhale 2 puffs into the lungs 4 times daily as needed for Wheezing, Disp: 18 g, Rfl: 0    levothyroxine (SYNTHROID) 125 MCG tablet, Take 1 tablet by mouth daily (Patient taking differently: Take 1 tablet by mouth daily 137 mcg), Disp: 90 tablet, Rfl: 1    fluticasone (FLONASE) 50 MCG/ACT nasal spray, 2 sprays by Each Nostril route 2 times daily, Disp: 16 g, Rfl:

## 2024-10-15 ASSESSMENT — ENCOUNTER SYMPTOMS
RHINORRHEA: 1
SINUS PRESSURE: 1
SINUS PAIN: 1

## 2024-12-06 DIAGNOSIS — E89.0 POST-SURGICAL HYPOTHYROIDISM: ICD-10-CM

## 2024-12-06 RX ORDER — LEVOTHYROXINE SODIUM 125 UG/1
125 TABLET ORAL DAILY
Qty: 90 TABLET | Refills: 1 | Status: SHIPPED | OUTPATIENT
Start: 2024-12-06

## 2025-01-14 ENCOUNTER — HOSPITAL ENCOUNTER (OUTPATIENT)
Dept: WOMENS IMAGING | Age: 60
Discharge: HOME | End: 2025-01-14
Payer: COMMERCIAL

## 2025-01-14 VITALS — HEIGHT: 63 IN | WEIGHT: 212 LBS | BODY MASS INDEX: 37.56 KG/M2

## 2025-01-14 DIAGNOSIS — Z12.31 VISIT FOR SCREENING MAMMOGRAM: ICD-10-CM

## 2025-01-14 PROCEDURE — 77063 BREAST TOMOSYNTHESIS BI: CPT

## 2025-01-25 ENCOUNTER — LAB (OUTPATIENT)
Dept: LAB | Age: 60
End: 2025-01-25

## 2025-01-25 LAB
CALCIUM SERPL-MCNC: 9.9 MG/DL (ref 8.5–10.5)
T4 FREE SERPL-MCNC: 1.3 NG/DL (ref 0.93–1.68)
TSH SERPL DL<=0.005 MIU/L-ACNC: 2.44 UIU/ML (ref 0.4–4.2)

## 2025-01-28 LAB — 1,25(OH)2D SERPL-MCNC: 36.8 PG/ML (ref 19.9–79.3)

## 2025-02-02 LAB — MISC. #1 REFERENCE GROUP TEST: ABNORMAL

## 2025-03-03 LAB — THYROGLOB SERPL-MCNC: < 0.5 NG/ML (ref 1.3–31.8)

## 2025-03-24 ENCOUNTER — OFFICE VISIT (OUTPATIENT)
Dept: FAMILY MEDICINE CLINIC | Age: 60
End: 2025-03-24
Payer: COMMERCIAL

## 2025-03-24 VITALS
DIASTOLIC BLOOD PRESSURE: 82 MMHG | HEIGHT: 62 IN | HEART RATE: 76 BPM | WEIGHT: 215.2 LBS | BODY MASS INDEX: 39.6 KG/M2 | SYSTOLIC BLOOD PRESSURE: 118 MMHG

## 2025-03-24 DIAGNOSIS — K21.9 GASTROESOPHAGEAL REFLUX DISEASE, UNSPECIFIED WHETHER ESOPHAGITIS PRESENT: ICD-10-CM

## 2025-03-24 DIAGNOSIS — E78.2 MIXED HYPERLIPIDEMIA: ICD-10-CM

## 2025-03-24 DIAGNOSIS — R73.01 IFG (IMPAIRED FASTING GLUCOSE): ICD-10-CM

## 2025-03-24 DIAGNOSIS — E89.0 POST-SURGICAL HYPOTHYROIDISM: Primary | ICD-10-CM

## 2025-03-24 DIAGNOSIS — E66.9 OBESITY (BMI 30-39.9): ICD-10-CM

## 2025-03-24 PROCEDURE — 99214 OFFICE O/P EST MOD 30 MIN: CPT | Performed by: NURSE PRACTITIONER

## 2025-03-24 PROCEDURE — G2211 COMPLEX E/M VISIT ADD ON: HCPCS | Performed by: NURSE PRACTITIONER

## 2025-03-24 RX ORDER — FAMOTIDINE 20 MG
2000 TABLET ORAL DAILY
COMMUNITY
Start: 2024-11-18

## 2025-03-24 RX ORDER — PHENTERMINE HYDROCHLORIDE 37.5 MG/1
37.5 TABLET ORAL
Qty: 30 TABLET | Refills: 0 | Status: SHIPPED | OUTPATIENT
Start: 2025-03-24 | End: 2025-04-23

## 2025-03-24 RX ORDER — LIOTHYRONINE SODIUM 5 UG/1
5 TABLET ORAL DAILY
COMMUNITY
Start: 2025-03-20

## 2025-03-24 SDOH — ECONOMIC STABILITY: FOOD INSECURITY: WITHIN THE PAST 12 MONTHS, THE FOOD YOU BOUGHT JUST DIDN'T LAST AND YOU DIDN'T HAVE MONEY TO GET MORE.: NEVER TRUE

## 2025-03-24 SDOH — ECONOMIC STABILITY: FOOD INSECURITY: WITHIN THE PAST 12 MONTHS, YOU WORRIED THAT YOUR FOOD WOULD RUN OUT BEFORE YOU GOT MONEY TO BUY MORE.: NEVER TRUE

## 2025-03-24 ASSESSMENT — ENCOUNTER SYMPTOMS
VOMITING: 0
ABDOMINAL PAIN: 0
TROUBLE SWALLOWING: 0
SORE THROAT: 0
WHEEZING: 0
NAUSEA: 0
FACIAL SWELLING: 0
COLOR CHANGE: 0
SHORTNESS OF BREATH: 0
COUGH: 0
BACK PAIN: 0
SINUS PAIN: 0
DIARRHEA: 0

## 2025-03-24 NOTE — PROGRESS NOTES
[facility]  [facility]  [address] [facility] [address]  Dept: [phone]  Dept Fax: [phone]  Loc: [phone]     3/24/2025     Farzana Carlos (:  1965) is a 59 y.o. female, here for evaluation of the following medical concerns:    Chief Complaint   Patient presents with    Follow-up     Discuss weight loss options, fatigue,        HPI  Pt presents to the office today for follow up on weight gain over the past few months.  She was on Adipex last year for 3 months and did well. She has not gained any weight back, But has not lost any either.   Doing well overall, working on diet and exercise.  Denies any side effects. No chest pain, SOB or palpitations from Adipex in the past.      Wt Readings from Last 3 Encounters:   25 97.6 kg (215 lb 3.2 oz)   25 96.2 kg (212 lb)   10/14/24 98.4 kg (217 lb)       BMI Readings from Last 3 Encounters:   25 39.36 kg/m²   25 37.55 kg/m²   10/14/24 38.44 kg/m²       Treatment Adherence:   Medication compliance:  compliant all of the time  Diet compliance:  compliant most of the time  Weight trend: stable                                     Sodium (meq/L)   Date Value   2024 138    BUN (mg/dL)   Date Value   2024 13    Glucose (mg/dL)   Date Value   2024 113 (H)   2022 105 (H)      Potassium (meq/L)   Date Value   2024 4.2    Creatinine (mg/dL)   Date Value   2024 0.6         Hyperlipidemia:  No new myalgias or GI upset on no meds, diet controlled.     Lab Results   Component Value Date    CHOL 231 (H) 2024    TRIG 192 2024    HDL 50 2024     Lab Results   Component Value Date    ALT 14 2024    AST 13 2024        Hypothyroidism: Recent symptoms: fatigue. She denies cold intolerance, heat intolerance, and hair loss. Patient is  taking her medication consistently on an empty stomach. Working with [facility].      No components found

## 2025-04-28 ENCOUNTER — OFFICE VISIT (OUTPATIENT)
Dept: FAMILY MEDICINE CLINIC | Age: 60
End: 2025-04-28
Payer: COMMERCIAL

## 2025-04-28 VITALS
WEIGHT: 212.4 LBS | HEART RATE: 92 BPM | DIASTOLIC BLOOD PRESSURE: 76 MMHG | HEIGHT: 62 IN | SYSTOLIC BLOOD PRESSURE: 132 MMHG | TEMPERATURE: 98 F | RESPIRATION RATE: 20 BRPM | BODY MASS INDEX: 39.08 KG/M2

## 2025-04-28 DIAGNOSIS — R73.01 IFG (IMPAIRED FASTING GLUCOSE): Primary | ICD-10-CM

## 2025-04-28 DIAGNOSIS — E66.01 CLASS 2 SEVERE OBESITY WITH SERIOUS COMORBIDITY AND BODY MASS INDEX (BMI) OF 38.0 TO 38.9 IN ADULT, UNSPECIFIED OBESITY TYPE (HCC): ICD-10-CM

## 2025-04-28 DIAGNOSIS — B36.9 FUNGAL DERMATITIS: ICD-10-CM

## 2025-04-28 DIAGNOSIS — R06.83 SNORING: ICD-10-CM

## 2025-04-28 DIAGNOSIS — E78.2 MIXED HYPERLIPIDEMIA: ICD-10-CM

## 2025-04-28 DIAGNOSIS — E66.812 CLASS 2 SEVERE OBESITY WITH SERIOUS COMORBIDITY AND BODY MASS INDEX (BMI) OF 38.0 TO 38.9 IN ADULT, UNSPECIFIED OBESITY TYPE (HCC): ICD-10-CM

## 2025-04-28 PROCEDURE — 99214 OFFICE O/P EST MOD 30 MIN: CPT | Performed by: NURSE PRACTITIONER

## 2025-04-28 RX ORDER — NYSTATIN 100000 [USP'U]/G
POWDER TOPICAL
Qty: 60 G | Refills: 2 | Status: SHIPPED | OUTPATIENT
Start: 2025-04-28

## 2025-04-28 RX ORDER — PHENTERMINE HYDROCHLORIDE 37.5 MG/1
37.5 TABLET ORAL
Qty: 30 TABLET | Refills: 0 | Status: SHIPPED | OUTPATIENT
Start: 2025-04-28 | End: 2025-05-28

## 2025-04-28 ASSESSMENT — ENCOUNTER SYMPTOMS
EYE PAIN: 0
SORE THROAT: 0
BACK PAIN: 0
SINUS PAIN: 0
DIARRHEA: 0
COUGH: 0
VOMITING: 0
COLOR CHANGE: 0
SWOLLEN GLANDS: 0
NAUSEA: 0
SHORTNESS OF BREATH: 0
FACIAL SWELLING: 0
CHANGE IN BOWEL HABIT: 0
WHEEZING: 0
ABDOMINAL PAIN: 0
TROUBLE SWALLOWING: 0

## 2025-04-28 ASSESSMENT — PATIENT HEALTH QUESTIONNAIRE - PHQ9
2. FEELING DOWN, DEPRESSED OR HOPELESS: NOT AT ALL
SUM OF ALL RESPONSES TO PHQ QUESTIONS 1-9: 0
SUM OF ALL RESPONSES TO PHQ QUESTIONS 1-9: 0
1. LITTLE INTEREST OR PLEASURE IN DOING THINGS: NOT AT ALL
SUM OF ALL RESPONSES TO PHQ QUESTIONS 1-9: 0
SUM OF ALL RESPONSES TO PHQ QUESTIONS 1-9: 0

## 2025-04-28 NOTE — PROGRESS NOTES
[facility]  [facility]  [address] [facility] [address]  Dept: [phone]  Dept Fax: [phone]  Loc: [phone]     2025     Farzana Carlos (:  1965) is a 60 y.o. female, here for evaluation of the following medical concerns:    Chief Complaint   Patient presents with    Weight Management     Patient here for weight check and medication refill. No concerns at this time.        Pt presents to the office today for follow up on weight loss medication using adipex.  Doing well on this even with her busy schedule.  She is also been told that she snores and that its time to do something about possible sleep apnea. Wakes up tired.  Wakes up a lot with dry mouth.      Sleep Problem  This is a new problem. The current episode started more than 1 year ago. The problem occurs daily. The problem has been gradually worsening. Associated symptoms include fatigue. Pertinent negatives include no abdominal pain, anorexia, arthralgias, change in bowel habit, chest pain, chills, congestion, coughing, fever, headaches, joint swelling, myalgias, nausea, neck pain, rash, sore throat, swollen glands, urinary symptoms, vertigo, visual change, vomiting or weakness. Nothing aggravates the symptoms. She has tried sleep, rest, position changes, relaxation and lying down for the symptoms. The treatment provided mild relief.     Pt also needs a refill on Nystatin powder.  She gets rashes under her belly area, especially during the hot weather.      Pt presents to the office today for follow up on weight loss using Adipex.  Month 2.  Doing well overall, working on diet and exercise.  Denies any side effects. No chest pain, SOB or palpitations.      Wt Readings from Last 3 Encounters:   25 96.3 kg (212 lb 6.4 oz)   25 97.6 kg (215 lb 3.2 oz)   25 96.2 kg (212 lb)       BMI Readings from Last 3 Encounters:   25 38.85 kg/m²   25 39.36 kg/m²

## 2025-04-29 ASSESSMENT — ENCOUNTER SYMPTOMS: VISUAL CHANGE: 0

## 2025-04-30 ENCOUNTER — TELEPHONE (OUTPATIENT)
Dept: FAMILY MEDICINE CLINIC | Age: 60
End: 2025-04-30

## 2025-04-30 ENCOUNTER — LAB (OUTPATIENT)
Dept: LAB | Age: 60
End: 2025-04-30

## 2025-04-30 DIAGNOSIS — Z01.84 IMMUNITY STATUS TESTING: Primary | ICD-10-CM

## 2025-04-30 LAB
T4 FREE SERPL-MCNC: 1.2 NG/DL (ref 0.92–1.68)
TSH SERPL DL<=0.05 MIU/L-ACNC: 4.02 UIU/ML (ref 0.27–4.2)

## 2025-04-30 NOTE — TELEPHONE ENCOUNTER
Pt requesting to have titre's completed for MMR/varicella. 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

## 2025-06-09 ENCOUNTER — OFFICE VISIT (OUTPATIENT)
Dept: FAMILY MEDICINE CLINIC | Age: 60
End: 2025-06-09
Payer: COMMERCIAL

## 2025-06-09 VITALS
WEIGHT: 205.6 LBS | BODY MASS INDEX: 37.84 KG/M2 | DIASTOLIC BLOOD PRESSURE: 74 MMHG | TEMPERATURE: 98.6 F | HEIGHT: 62 IN | RESPIRATION RATE: 18 BRPM | HEART RATE: 80 BPM | SYSTOLIC BLOOD PRESSURE: 120 MMHG

## 2025-06-09 DIAGNOSIS — E66.01 CLASS 2 SEVERE OBESITY WITH SERIOUS COMORBIDITY AND BODY MASS INDEX (BMI) OF 37.0 TO 37.9 IN ADULT, UNSPECIFIED OBESITY TYPE (HCC): Primary | ICD-10-CM

## 2025-06-09 DIAGNOSIS — K21.9 GASTROESOPHAGEAL REFLUX DISEASE, UNSPECIFIED WHETHER ESOPHAGITIS PRESENT: ICD-10-CM

## 2025-06-09 DIAGNOSIS — E78.2 MIXED HYPERLIPIDEMIA: ICD-10-CM

## 2025-06-09 DIAGNOSIS — R73.01 IFG (IMPAIRED FASTING GLUCOSE): ICD-10-CM

## 2025-06-09 DIAGNOSIS — E66.812 CLASS 2 SEVERE OBESITY WITH SERIOUS COMORBIDITY AND BODY MASS INDEX (BMI) OF 37.0 TO 37.9 IN ADULT, UNSPECIFIED OBESITY TYPE (HCC): Primary | ICD-10-CM

## 2025-06-09 DIAGNOSIS — E89.0 POST-SURGICAL HYPOTHYROIDISM: ICD-10-CM

## 2025-06-09 DIAGNOSIS — K12.0 CANKER SORE: ICD-10-CM

## 2025-06-09 PROCEDURE — 99214 OFFICE O/P EST MOD 30 MIN: CPT | Performed by: NURSE PRACTITIONER

## 2025-06-09 RX ORDER — PHENTERMINE HYDROCHLORIDE 37.5 MG/1
37.5 TABLET ORAL
Qty: 30 TABLET | Refills: 0 | Status: SHIPPED | OUTPATIENT
Start: 2025-06-09 | End: 2025-07-09

## 2025-06-09 RX ORDER — LIDOCAINE HYDROCHLORIDE 20 MG/ML
15 SOLUTION OROPHARYNGEAL PRN
Qty: 100 ML | Refills: 0 | Status: SHIPPED | OUTPATIENT
Start: 2025-06-09

## 2025-06-09 ASSESSMENT — ENCOUNTER SYMPTOMS
FACIAL SWELLING: 0
SINUS PAIN: 0
VOMITING: 0
TROUBLE SWALLOWING: 0
WHEEZING: 0
SORE THROAT: 0
EYE PAIN: 0
DIARRHEA: 0
ABDOMINAL PAIN: 0
NAUSEA: 0
COLOR CHANGE: 0
BACK PAIN: 0
SHORTNESS OF BREATH: 0
COUGH: 0

## 2025-06-09 NOTE — ASSESSMENT & PLAN NOTE
Chronic, at goal (stable), continue current treatment plan, medication adherence emphasized, and lifestyle modifications recommended  - Labs after 12 hours fasting  Orders:    Lipid Panel; Future    Comprehensive Metabolic Panel; Future

## 2025-06-09 NOTE — PROGRESS NOTES
alternative treatments discussed.;No signs of potential drug abuse or diversion identified.;Assessed functional status (ability to engage in work or other purposeful activities, the pain intensity and its interference with activities of daily living, quality of family life and social activities, and the physical activity);Obtaining appropriate analgesic effect of treatment.         Return in 4 weeks (on 7/7/2025), or if symptoms worsen or fail to improve, for Routine follow up, Medication check.    Patient given educational materials - see patient instructions.  Discussed use, benefit, and side effects of prescribed medications.  All patient questions answered.  Pt voiced understanding. Reviewed health maintenance.     An electronic signature was used to authenticate this note.    --ZAMZAM LICONA - CNP on 6/9/2025 at 4:22 PM

## 2025-07-09 ENCOUNTER — TELEMEDICINE (OUTPATIENT)
Dept: FAMILY MEDICINE CLINIC | Age: 60
End: 2025-07-09

## 2025-07-09 DIAGNOSIS — E78.2 MIXED HYPERLIPIDEMIA: ICD-10-CM

## 2025-07-09 DIAGNOSIS — E66.01 CLASS 2 SEVERE OBESITY WITH SERIOUS COMORBIDITY AND BODY MASS INDEX (BMI) OF 37.0 TO 37.9 IN ADULT, UNSPECIFIED OBESITY TYPE (HCC): Primary | ICD-10-CM

## 2025-07-09 DIAGNOSIS — R73.01 IFG (IMPAIRED FASTING GLUCOSE): ICD-10-CM

## 2025-07-09 DIAGNOSIS — E66.812 CLASS 2 SEVERE OBESITY WITH SERIOUS COMORBIDITY AND BODY MASS INDEX (BMI) OF 37.0 TO 37.9 IN ADULT, UNSPECIFIED OBESITY TYPE (HCC): Primary | ICD-10-CM

## 2025-07-09 RX ORDER — PHENTERMINE HYDROCHLORIDE 37.5 MG/1
37.5 TABLET ORAL
Qty: 30 TABLET | Refills: 0 | Status: SHIPPED | OUTPATIENT
Start: 2025-07-09 | End: 2025-08-08

## 2025-07-09 ASSESSMENT — ENCOUNTER SYMPTOMS
NAUSEA: 0
SORE THROAT: 0
EYE PAIN: 0
BACK PAIN: 0
DIARRHEA: 0
SINUS PAIN: 0
ABDOMINAL PAIN: 0
SHORTNESS OF BREATH: 0
TROUBLE SWALLOWING: 0
VOMITING: 0
WHEEZING: 0
COUGH: 0
COLOR CHANGE: 0
FACIAL SWELLING: 0

## 2025-07-09 NOTE — PROGRESS NOTES
Farzana Carlos, was evaluated through a synchronous (real-time) audio-video encounter. The patient (or guardian if applicable) is aware that this is a billable service, which includes applicable co-pays. This Virtual Visit was conducted with patient's (and/or legal guardian's) consent. Patient identification was verified, and a caregiver was present when appropriate.   The patient was located at Home: [address] [facility] [address]  Provider was located at Facility (Appt Dept): [address] [facility]  [address]  [place] [address]  Confirm you are appropriately licensed, registered, or certified to deliver care in the state where the patient is located as indicated above. If you are not or unsure, please re-schedule the visit: Yes, I confirm.     Farzana Carlos (:  1965) is a Established patient, presenting virtually for evaluation of the following:      Below is the assessment and plan developed based on review of pertinent history, physical exam, labs, studies, and medications.     Assessment & Plan  Class 2 severe obesity with serious comorbidity and body mass index (BMI) of 37.0 to 37.9 in adult, unspecified obesity type (HCC)   Chronic, not at goal (unstable), changes made today: Refill Adipex, medication adherence emphasized, and lifestyle modifications recommended    Orders:    phentermine (ADIPEX-P) 37.5 MG tablet; Take 1 tablet by mouth every morning (before breakfast) for 30 days. Max Daily Amount: 37.5 mg    IFG (impaired fasting glucose)   Chronic, at goal (stable), continue current treatment plan and lifestyle modifications recommended         Mixed hyperlipidemia   Chronic, at goal (stable), continue current treatment plan and lifestyle modifications recommended       Controlled Substance Monitoring:    Acute and Chronic Pain Monitoring:   RX Monitoring Periodic Controlled Substance Monitoring   2025   4:00 PM Possible medication side effects, risk of tolerance/dependence &

## 2025-08-18 ENCOUNTER — TELEMEDICINE (OUTPATIENT)
Dept: FAMILY MEDICINE CLINIC | Age: 60
End: 2025-08-18
Payer: COMMERCIAL

## 2025-08-18 DIAGNOSIS — E66.812 CLASS 2 SEVERE OBESITY WITH SERIOUS COMORBIDITY AND BODY MASS INDEX (BMI) OF 37.0 TO 37.9 IN ADULT, UNSPECIFIED OBESITY TYPE (HCC): Primary | ICD-10-CM

## 2025-08-18 DIAGNOSIS — E66.01 CLASS 2 SEVERE OBESITY WITH SERIOUS COMORBIDITY AND BODY MASS INDEX (BMI) OF 37.0 TO 37.9 IN ADULT, UNSPECIFIED OBESITY TYPE (HCC): Primary | ICD-10-CM

## 2025-08-18 DIAGNOSIS — R73.01 IFG (IMPAIRED FASTING GLUCOSE): ICD-10-CM

## 2025-08-18 PROCEDURE — 99213 OFFICE O/P EST LOW 20 MIN: CPT | Performed by: NURSE PRACTITIONER

## 2025-08-18 RX ORDER — PHENTERMINE HYDROCHLORIDE 37.5 MG/1
37.5 TABLET ORAL
Qty: 30 TABLET | Refills: 0 | Status: SHIPPED | OUTPATIENT
Start: 2025-08-18 | End: 2025-09-17

## 2025-08-19 ENCOUNTER — OFFICE VISIT (OUTPATIENT)
Age: 60
End: 2025-08-19
Payer: COMMERCIAL

## 2025-08-19 VITALS
WEIGHT: 199.8 LBS | HEIGHT: 64 IN | DIASTOLIC BLOOD PRESSURE: 78 MMHG | OXYGEN SATURATION: 97 % | HEART RATE: 78 BPM | TEMPERATURE: 98.7 F | BODY MASS INDEX: 34.11 KG/M2 | SYSTOLIC BLOOD PRESSURE: 122 MMHG

## 2025-08-19 DIAGNOSIS — R53.83 FATIGUE, UNSPECIFIED TYPE: ICD-10-CM

## 2025-08-19 DIAGNOSIS — Z99.89: Primary | ICD-10-CM

## 2025-08-19 DIAGNOSIS — G47.33: Primary | ICD-10-CM

## 2025-08-19 DIAGNOSIS — R06.83 LOUD SNORING: ICD-10-CM

## 2025-08-19 PROCEDURE — 99204 OFFICE O/P NEW MOD 45 MIN: CPT | Performed by: INTERNAL MEDICINE

## 2025-08-19 RX ORDER — LEVOTHYROXINE SODIUM 112 UG/1
112 TABLET ORAL DAILY
COMMUNITY
Start: 2025-06-05

## (undated) DEVICE — TUBING 1895522 5PK STRAIGHTSHOT TO XPS: Brand: STRAIGHTSHOT®

## (undated) DEVICE — PENCIL SMK EVAC ALL IN 1 DSGN ENH VISIBILITY IMPROVED AIR

## (undated) DEVICE — PACK-MAJOR

## (undated) DEVICE — GAUZE,SPONGE,8"X4",12PLY,XRAY,STRL,LF: Brand: MEDLINE

## (undated) DEVICE — CORD,CAUTERY,BIPOLAR,STERILE: Brand: MEDLINE

## (undated) DEVICE — BLADE 1884012EM RAD12 4MM M4 ROTATE ROHS: Brand: FUSION®

## (undated) DEVICE — GLOVE SURG SZ 75 L12IN FNGR THK94MIL TRNSLUC YEL LTX

## (undated) DEVICE — BLADE 1884006EM RAD40 4MM M4 ROTATE ROHS: Brand: FUSION®

## (undated) DEVICE — GLOVE SURG SZ 8 L11.77IN FNGR THK9.8MIL STRW LTX POLYMER

## (undated) DEVICE — SHEET, T, LAPAROTOMY, STERILE: Brand: MEDLINE

## (undated) DEVICE — DRESSING TRNSPAR W2XL2.75IN FLM SHT SEMIPERMEABLE WIND

## (undated) DEVICE — PAD,NON-ADHERENT,3X8,STERILE,LF,1/PK: Brand: MEDLINE

## (undated) DEVICE — SPONGE GZ W4XL4IN COT 12 PLY TYP VII WVN C FLD DSGN

## (undated) DEVICE — PATIENT TRACKER 9734887XOM NON-INVASIVE

## (undated) DEVICE — APPLICATOR MEDICATED 26 CC SOLUTION HI LT ORNG CHLORAPREP

## (undated) DEVICE — SUTURE ABSORBABLE MONOFILAMENT 0 CTX 60 IN VIO PDS + PDP990G

## (undated) DEVICE — STERILE COTTON BALLS LARGE 5/P: Brand: MEDLINE

## (undated) DEVICE — SUTURE VCRL + SZ 0 L18IN ABSRB TIE VCP106G

## (undated) DEVICE — SILICONE DUAL LUMEN STOMACH TUBE,ANTI-REFLUX VALVE AND RADIOPAQUE LINE: Brand: SALEM SUMP

## (undated) DEVICE — PACK PROCEDURE SURG SET UP SRMC

## (undated) DEVICE — GOWN,SIRUS,NON REINFRCD,LARGE,SET IN SL: Brand: MEDLINE

## (undated) DEVICE — NEEDLE HYPO 27GA L1.25IN GRY POLYPR HUB S STL REG BVL STR

## (undated) DEVICE — SUTURE ABSORBABLE BRAIDED 0 CT-1 8X27 IN UD VICRYL JJ41G

## (undated) DEVICE — SYRINGE MED 10ML TRNSLUC BRL PLUNG BLK MRK POLYPR CTRL

## (undated) DEVICE — INSTRUMENT TRACKER 9733533XOM ENT 1PK

## (undated) DEVICE — BLADE 1884002HRE M4 SERR 4MM ROTATE: Brand: STRAIGHTSHOT

## (undated) DEVICE — 1010 S-DRAPE TOWEL DRAPE 10/BX: Brand: STERI-DRAPE™

## (undated) DEVICE — COAGULATOR SUCT 8FR L6IN HNDL FOR ENT PROC

## (undated) DEVICE — NEEDLE SYR 18GA L1.5IN RED PLAS HUB S STL BLNT FILL W/O

## (undated) DEVICE — DRAPE,SPLIT ,77X120: Brand: MEDLINE

## (undated) DEVICE — BASIC SINGLE BASIN BTC-LF: Brand: MEDLINE INDUSTRIES, INC.

## (undated) DEVICE — CODMAN® SURGICAL PATTIES 1/2" X 3" (1.27CM X 7.62CM): Brand: CODMAN®

## (undated) DEVICE — KIT,ANTI FOG,W/SPONGE & FLUID,SOFT PACK: Brand: MEDLINE

## (undated) DEVICE — TUBING, SUCTION, 1/4" X 20', STRAIGHT: Brand: MEDLINE INDUSTRIES, INC.

## (undated) DEVICE — SPONGE LAP W18XL18IN WHT COT 4 PLY FLD STRUNG RADPQ DISP ST

## (undated) DEVICE — SOLUTION IV 500ML 0.45% SOD CHL PH 5 INJ USP VIAFLX PLAS

## (undated) DEVICE — MICRO TIP WIPE: Brand: DEVON

## (undated) DEVICE — TAPE ADH W1INXL10YD PLAS TRNSPAR H2O RESIST HYPOALRG CURAD

## (undated) DEVICE — Z INACTIVE USE 2735373 APPLICATOR FBR LAIN COT WOOD TIP ECONOMICAL

## (undated) DEVICE — SYRINGE IRRIG 60ML SFT PLIABLE BLB EZ TO GRP 1 HND USE W/

## (undated) DEVICE — 3M™ STERI-STRIP™ COMPOUND BENZOIN TINCTURE 40 BAGS/CARTON 4 CARTONS/CASE C1544: Brand: 3M™ STERI-STRIP™

## (undated) DEVICE — GOWN,SIRUS,NONRNF,SETINSLV,XL,20/CS: Brand: MEDLINE

## (undated) DEVICE — TOTAL TRAY, DB, 100% SILI FOLEY, 16FR 10: Brand: MEDLINE

## (undated) DEVICE — ADHESIVE SKIN CLSR 0.7ML TOP DERMBND ADV

## (undated) DEVICE — GLOVE ORANGE PI 7   MSG9070

## (undated) DEVICE — SPLINT 1524050 5PK PAIR DOYLE II AIRWAY: Brand: DOYLE II ™

## (undated) DEVICE — PACK PROCEDURE SURG PLAS SC MIN SRHP LF

## (undated) DEVICE — AGENT HEMOSTATIC SURGIFLOW MATRIX KIT W/THROMBIN

## (undated) DEVICE — GLOVE SURG SZ 65 THK91MIL LTX FREE SYN POLYISOPRENE

## (undated) DEVICE — NEEDLE SPNL L3.5IN DIA25GA QNCKE TYP BVL SPINOCAN

## (undated) DEVICE — MONOJECT MAGELLAN 1 ML TUBERCULIN SAFETY SYRINGE PERMANENT NEEDLE 27G X1/2 IN. (0.4 X 13 MM): Brand: MONOJECT

## (undated) DEVICE — UNIVERSAL MONOPOLAR LAPAROSCOPIC CABLE 10FT, 4MM PIN CONNECTOR: Brand: CONMED

## (undated) DEVICE — GAUZE,SPONGE,4"X4",12PLY,STERILE,LF,2'S: Brand: MEDLINE

## (undated) DEVICE — BLADE 1883517 RAD120 3PK MAX SINUS CVD: Brand: RAD